# Patient Record
Sex: FEMALE | Race: WHITE | NOT HISPANIC OR LATINO | Employment: OTHER | URBAN - NONMETROPOLITAN AREA
[De-identification: names, ages, dates, MRNs, and addresses within clinical notes are randomized per-mention and may not be internally consistent; named-entity substitution may affect disease eponyms.]

---

## 2017-01-05 ENCOUNTER — TELEPHONE (OUTPATIENT)
Dept: FAMILY MEDICINE CLINIC | Facility: CLINIC | Age: 77
End: 2017-01-05

## 2017-01-05 RX ORDER — ALBUTEROL SULFATE 90 UG/1
2 AEROSOL, METERED RESPIRATORY (INHALATION) EVERY 4 HOURS PRN
Qty: 1 INHALER | Refills: 11 | Status: SHIPPED | OUTPATIENT
Start: 2017-01-05 | End: 2017-01-12 | Stop reason: SDUPTHER

## 2017-01-05 NOTE — TELEPHONE ENCOUNTER
----- Message from Lynn Melchor MA sent at 1/5/2017  2:18 PM CST -----  NEED REFILL VENTOLIN.    PT HAS APPT Monday, NEED REFILL BEFORE Monday.    [facility].

## 2017-01-07 ENCOUNTER — TRANSCRIBE ORDERS (OUTPATIENT)
Dept: CT IMAGING | Facility: HOSPITAL | Age: 77
End: 2017-01-07

## 2017-01-07 DIAGNOSIS — C15.5 MALIGNANT NEOPLASM OF LOWER THIRD OF ESOPHAGUS (HCC): Primary | ICD-10-CM

## 2017-01-09 ENCOUNTER — OFFICE VISIT (OUTPATIENT)
Dept: FAMILY MEDICINE CLINIC | Facility: CLINIC | Age: 77
End: 2017-01-09

## 2017-01-09 VITALS
SYSTOLIC BLOOD PRESSURE: 150 MMHG | BODY MASS INDEX: 24.98 KG/M2 | HEIGHT: 58 IN | DIASTOLIC BLOOD PRESSURE: 60 MMHG | HEART RATE: 126 BPM | OXYGEN SATURATION: 95 % | TEMPERATURE: 97.5 F | WEIGHT: 119 LBS

## 2017-01-09 DIAGNOSIS — M25.572 ACUTE LEFT ANKLE PAIN: Primary | ICD-10-CM

## 2017-01-09 PROCEDURE — 99213 OFFICE O/P EST LOW 20 MIN: CPT | Performed by: FAMILY MEDICINE

## 2017-01-09 RX ORDER — HYDROCODONE BITARTRATE AND ACETAMINOPHEN 5; 325 MG/1; MG/1
1 TABLET ORAL EVERY 4 HOURS PRN
COMMUNITY
End: 2017-09-12

## 2017-01-09 NOTE — PROGRESS NOTES
" Subjective   Tamara Singh is a 76 y.o. female.     History of Present Illness     Last Wednesday, tripped/fell and hurt left ankle.  Went to [facility].  Patient says \"arthritis so bad in feet cant tell if broken.\"  She is walking today using a walker. Given norco 5 and has no more.     Review of Systems   Constitutional: Negative for chills, fatigue and fever.   HENT: Negative for congestion, ear discharge, ear pain, facial swelling, hearing loss, postnasal drip, rhinorrhea, sinus pressure, sore throat, trouble swallowing and voice change.    Eyes: Negative for discharge, redness and visual disturbance.   Respiratory: Negative for cough, chest tightness, shortness of breath and wheezing.    Cardiovascular: Negative for chest pain and palpitations.   Gastrointestinal: Negative for abdominal pain, blood in stool, constipation, diarrhea, nausea and vomiting.   Endocrine: Negative for polydipsia and polyuria.   Genitourinary: Negative for dysuria, flank pain, hematuria and urgency.   Musculoskeletal: Positive for arthralgias and joint swelling. Negative for back pain and myalgias.   Skin: Negative for rash.   Neurological: Negative for dizziness, weakness, numbness and headaches.   Hematological: Negative for adenopathy.   Psychiatric/Behavioral: Negative for confusion and sleep disturbance. The patient is not nervous/anxious.        Objective   Physical Exam   Constitutional: She is oriented to person, place, and time. She appears well-developed and well-nourished.   HENT:   Head: Normocephalic and atraumatic.   Right Ear: External ear normal.   Left Ear: External ear normal.   Nose: Nose normal.   Eyes: Conjunctivae and EOM are normal. Pupils are equal, round, and reactive to light.   Neck: Normal range of motion.   Pulmonary/Chest: Effort normal.   Musculoskeletal: Normal range of motion.   Left ankle mild swelling.     Neurological: She is alert and oriented to person, place, and time.   Psychiatric: " She has a normal mood and affect. Her behavior is normal. Judgment and thought content normal.   Nursing note and vitals reviewed.      Assessment/Plan   Tamara was seen today for follow-up and pain.    Diagnoses and all orders for this visit:    Acute left ankle pain    Other orders  -     diclofenac (VOLTAREN) 50 MG EC tablet; Take 1 tablet by mouth 2 (Two) Times a Day.      Diclofenac should help pain more.  Continue walker.  If not a lot better in 2 weeks will need another xray.

## 2017-01-09 NOTE — MR AVS SNAPSHOT
Tamara Singh   1/9/2017 4:00 PM   Office Visit    Dept Phone:  [phone]   Encounter #:  [id number]    Provider:  Jean Pierre Scanlon MD   Department:  [facility]                Your Full Care Plan              Today's Medication Changes          These changes are accurate as of: 1/9/17  4:20 PM.  If you have any questions, ask your nurse or doctor.               New Medication(s)Ordered:     diclofenac 50 MG EC tablet   Commonly known as:  VOLTAREN   Take 1 tablet by mouth 2 (Two) Times a Day.   Started by:  Jean Pierre Scanlon MD         Medication(s)that have changed:     potassium chloride 10 MEQ CR tablet   Commonly known as:  K-DUR   Take 10 mEq by mouth daily.   What changed:  Another medication with the same name was removed. Continue taking this medication, and follow the directions you see here.   Changed by:  Corina Tenorio            Where to Get Your Medications      These medications were sent to [address] [phone] [facility] [phone] [address]     Phone:  [phone]     diclofenac 50 MG EC tablet                  Your Updated Medication List          This list is accurate as of: 1/9/17  4:20 PM.  Always use your most recent med list.                albuterol 108 (90 BASE) MCG/ACT inhaler   Commonly known as:  PROVENTIL HFA;VENTOLIN HFA   Inhale 2 puffs Every 4 (Four) Hours As Needed for wheezing.       Cholecalciferol 81214 UNITS capsule       * COMBIVENT RESPIMAT  MCG/ACT inhaler   Generic drug:  ipratropium-albuterol       * ipratropium-albuterol 0.5-2.5 mg/mL nebulizer   Commonly known as:  DUO-NEB       diclofenac 50 MG EC tablet   Commonly known as:  VOLTAREN   Take 1 tablet by mouth 2 (Two) Times a Day.       furosemide 40 MG tablet   Commonly known as:  LASIX       HYDROcodone-acetaminophen 5-325 MG per tablet   Commonly known as:   NORCO       latanoprost 0.005 % ophthalmic solution   Commonly known as:  XALATAN   Administer 1 drop to both eyes Every Night.       lisinopril 20 MG tablet   Commonly known as:  PRINIVIL,ZESTRIL   1 tablet daily.       potassium chloride 10 MEQ CR tablet   Commonly known as:  K-DUR       * Notice:  This list has 2 medication(s) that are the same as other medications prescribed for you. Read the directions carefully, and ask your doctor or other care provider to review them with you.            Instructions     None    Patient Instructions History      Upcoming Appointments     Visit Type Date Time Department    OFFICE VISIT 1/9/2017  4:00 PM [facility]    LAB 1/23/2017  1:30 PM  [facility]    CT [facility] CHEST W CONTRAST 1/24/2017 10:00 AM  [facility] CT    FOLLOW UP 2/3/2017  2:00 PM [facility] [place]    LAB 2/3/2017  1:30 PM  [facility]    VISUAL FIELD 3/21/2017 10:15 AM [facility] OPHTHALMOLOGY [facility]    OV WITH VISUAL FIELD 3/21/2017 10:45 AM [facility] OPHTHALMOLOGY [facility]      MyChart Signup     Our records indicate that you have declined [employer] signup. If you would like to sign up for [employer], please email [employer] [email or web address] [email or web address] or call [phone] to obtain an activation code.             Other Info from Your Visit           Your Appointments     Jan 23, 2017  1:30 PM CST   LAB with NURSE ISAÍAS GARCIA   [facility] ([address] [address] [phone]            Jan 24, 2017 10:00 AM CST   CT mad chest w contrast with MAD CT 1   [facility] ([place])    [address] [place] [id number] [phone]           NPO 4 HOURS PRIOR TO EXAM            Feb 03, 2017  1:30 PM CST   LAB with NURSE ISAÍAS GARCIA   [facility] ([address] [address] [phone]            Feb 03, 2017  2:00 PM CST   Follow Up with Jaun Mathew MD  "  [facility] HEMATOLOGY [facility]Y ([place])    [address] [place] [address] [phone]           Arrive 15 minutes prior to appointment.            Mar 21, 2017 10:15 AM CDT   Visual Field with [facility] OPHTHALMOLOGY (--)    [address]  [address] [facility] [address] [phone]              Allergies     No Known Allergies      Reason for Visit     Follow-up ER    Pain left leg      Vital Signs     Blood Pressure Pulse Temperature Height    150/60 (BP Location: Left arm, Patient Position: Sitting, Cuff Size: Adult) 126 97.5 °F (36.4 °C) (Temporal Artery ) 58\" (147.3 cm)    Weight Oxygen Saturation Body Mass Index Smoking Status    119 lb (54 kg) 95% 24.87 kg/m2 Former Smoker        "

## 2017-01-11 DIAGNOSIS — C15.5 MALIGNANT NEOPLASM OF LOWER THIRD OF ESOPHAGUS (HCC): Primary | ICD-10-CM

## 2017-01-23 ENCOUNTER — APPOINTMENT (OUTPATIENT)
Dept: ONCOLOGY | Facility: HOSPITAL | Age: 77
End: 2017-01-23

## 2017-01-24 ENCOUNTER — APPOINTMENT (OUTPATIENT)
Dept: CT IMAGING | Facility: HOSPITAL | Age: 77
End: 2017-01-24
Attending: INTERNAL MEDICINE

## 2017-02-03 ENCOUNTER — APPOINTMENT (OUTPATIENT)
Dept: ONCOLOGY | Facility: HOSPITAL | Age: 77
End: 2017-02-03

## 2017-02-13 ENCOUNTER — HOSPITAL ENCOUNTER (OUTPATIENT)
Dept: CT IMAGING | Facility: HOSPITAL | Age: 77
Discharge: HOME | End: 2017-02-13
Admitting: INTERNAL MEDICINE

## 2017-02-13 ENCOUNTER — LAB REQUISITION (OUTPATIENT)
Dept: LAB | Facility: HOSPITAL | Age: 77
End: 2017-02-13

## 2017-02-13 DIAGNOSIS — C15.5 MALIGNANT NEOPLASM OF LOWER THIRD OF ESOPHAGUS (HCC): ICD-10-CM

## 2017-02-13 LAB
ALBUMIN SERPL-MCNC: 4.2 G/DL (ref 3.4–4.8)
ALBUMIN/GLOB SERPL: 1.3 G/DL (ref 1.1–1.8)
ALP SERPL-CCNC: 178 U/L (ref 38–126)
ALT SERPL W P-5'-P-CCNC: 45 U/L (ref 9–52)
ANION GAP SERPL CALCULATED.3IONS-SCNC: 10 MMOL/L (ref 5–15)
AST SERPL-CCNC: 40 U/L (ref 14–36)
BASOPHILS # BLD AUTO: 0.03 10*3/MM3 (ref 0–0.2)
BASOPHILS NFR BLD AUTO: 0.5 % (ref 0–2)
BILIRUB SERPL-MCNC: 0.4 MG/DL (ref 0.2–1.3)
BUN BLD-MCNC: 18 MG/DL (ref 7–21)
BUN/CREAT SERPL: 17.5 (ref 7–25)
CALCIUM SPEC-SCNC: 9.6 MG/DL (ref 8.4–10.2)
CHLORIDE SERPL-SCNC: 103 MMOL/L (ref 95–110)
CO2 SERPL-SCNC: 29 MMOL/L (ref 22–31)
CREAT BLD-MCNC: 1.03 MG/DL (ref 0.5–1)
DEPRECATED RDW RBC AUTO: 46.3 FL (ref 36.4–46.3)
EOSINOPHIL # BLD AUTO: 0.08 10*3/MM3 (ref 0–0.7)
EOSINOPHIL NFR BLD AUTO: 1.3 % (ref 0–7)
ERYTHROCYTE [DISTWIDTH] IN BLOOD BY AUTOMATED COUNT: 13.1 % (ref 11.5–14.5)
GFR SERPL CREATININE-BSD FRML MDRD: 52 ML/MIN/1.73 (ref 39–90)
GLOBULIN UR ELPH-MCNC: 3.2 GM/DL (ref 2.3–3.5)
GLUCOSE BLD-MCNC: 94 MG/DL (ref 60–100)
HCT VFR BLD AUTO: 35.5 % (ref 35–45)
HGB BLD-MCNC: 11.6 G/DL (ref 12–15.5)
IMM GRANULOCYTES # BLD: 0.01 10*3/MM3 (ref 0–0.02)
IMM GRANULOCYTES NFR BLD: 0.2 % (ref 0–0.5)
LYMPHOCYTES # BLD AUTO: 1.51 10*3/MM3 (ref 0.6–4.2)
LYMPHOCYTES NFR BLD AUTO: 24.2 % (ref 10–50)
MCH RBC QN AUTO: 31.4 PG (ref 26.5–34)
MCHC RBC AUTO-ENTMCNC: 32.7 G/DL (ref 31.4–36)
MCV RBC AUTO: 96.2 FL (ref 80–98)
MONOCYTES # BLD AUTO: 0.46 10*3/MM3 (ref 0–0.9)
MONOCYTES NFR BLD AUTO: 7.4 % (ref 0–12)
NEUTROPHILS # BLD AUTO: 4.16 10*3/MM3 (ref 2–8.6)
NEUTROPHILS NFR BLD AUTO: 66.4 % (ref 37–80)
NRBC BLD MANUAL-RTO: 0 /100 WBC (ref 0–0)
PLATELET # BLD AUTO: 256 10*3/MM3 (ref 150–450)
PMV BLD AUTO: 9.2 FL (ref 8–12)
POTASSIUM BLD-SCNC: 4.9 MMOL/L (ref 3.5–5.1)
PROT SERPL-MCNC: 7.4 G/DL (ref 6.3–8.6)
RBC # BLD AUTO: 3.69 10*6/MM3 (ref 3.77–5.16)
SODIUM BLD-SCNC: 142 MMOL/L (ref 137–145)
WBC NRBC COR # BLD: 6.25 10*3/MM3 (ref 3.2–9.8)

## 2017-02-13 PROCEDURE — 71260 CT THORAX DX C+: CPT

## 2017-02-13 PROCEDURE — 0 IOPAMIDOL 61 % SOLUTION: Performed by: INTERNAL MEDICINE

## 2017-02-13 PROCEDURE — 85025 COMPLETE CBC W/AUTO DIFF WBC: CPT | Performed by: INTERNAL MEDICINE

## 2017-02-13 PROCEDURE — 80053 COMPREHEN METABOLIC PANEL: CPT | Performed by: INTERNAL MEDICINE

## 2017-02-13 RX ADMIN — IOPAMIDOL 80 ML: 612 INJECTION, SOLUTION INTRAVENOUS at 14:15

## 2017-02-21 ENCOUNTER — OFFICE VISIT (OUTPATIENT)
Dept: ONCOLOGY | Facility: CLINIC | Age: 77
End: 2017-02-21

## 2017-02-21 ENCOUNTER — DOCUMENTATION (OUTPATIENT)
Dept: ONCOLOGY | Facility: CLINIC | Age: 77
End: 2017-02-21

## 2017-02-21 VITALS
DIASTOLIC BLOOD PRESSURE: 72 MMHG | SYSTOLIC BLOOD PRESSURE: 142 MMHG | RESPIRATION RATE: 18 BRPM | HEART RATE: 97 BPM | HEIGHT: 58 IN | WEIGHT: 120.5 LBS | BODY MASS INDEX: 25.3 KG/M2 | TEMPERATURE: 97.9 F

## 2017-02-21 DIAGNOSIS — Z85.01 PERSONAL HISTORY OF ESOPHAGEAL CANCER: Primary | ICD-10-CM

## 2017-02-21 PROCEDURE — 99214 OFFICE O/P EST MOD 30 MIN: CPT | Performed by: INTERNAL MEDICINE

## 2017-02-21 PROCEDURE — G0463 HOSPITAL OUTPT CLINIC VISIT: HCPCS

## 2017-02-21 NOTE — PROGRESS NOTES
Ms. Singh returns today for follow-up for esophageal cancer.    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  She was treated with radiation given concurrently with carboplatin and Taxol until September 2013.  She has remained in remission since.    Interval history:  She's been doing extremely well with no difficulty swallowing.  She has mild choking spell.  Otherwise, she is able to swallow solids and liquids orally.  Her weight is stable.  She denies any nausea, vomiting or heartburn.  She has a history of COPD and needs oxygen and during sleep.  She uses bronchodilators on a regular basis.  Her performance status is limited by COPD related shortness of breath up to 1-2 blocks of walking.  She denies any abdominal pain, change in bowel habits, melena or hematochezia.    All other systems review is negative.  Her current medications, past medical, social, family history have been reviewed and updated in the EMR    Physical exam shows her to be in no apparent distress.  She is thin built, pleasant.  No pallor, icterus or skin rashes were noted.  Examination of the neck supraclavicular and axillary areas shows no lymph nodes enlargement.  Lungs show bilateral scattered rhonchi with prolonged expiration without any dullness to percussion.  Heart sounds are regular.    Abdomen is soft, nontender, no hepatosplenomegaly.  All 4 extremities are without any edema.  Nervous system examination shows no cross motor or sensory deficits.  Cranial nerves are intact.  Her CBC and CMP today are normal.    Data CT scan of the chest on January 7, 2017 that showed no signs of recurrence.  There were extensive emphysematous changes and chronic fibrosis.    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  She is about 3 years out with no  evidence of disease recurrence.  I reviewed the recent CT scan results with her.  She has quit smoking several years ago.  Her overall performance status is limited by moderate to severe COPD, she is oxygen dependent during sleep and with activity.  At this point we'll wait to manage her with a conservative approach and have her return in 6 months.  Future imaging studies only for symptoms or signs suspicious for recurrence.  She was encouraged to contact us for any problems with dysphagia, hoarseness of voice or excessive shortness of breath, chest pain, etc.

## 2017-02-21 NOTE — PROGRESS NOTES
LCSW contacted by RN as pt presents for follow up with medical oncologist. Pt with history of esophogeal cancer, has completed treatment presents for routine follow up. Pt. Stated feelings indicative of depression to nurse and SW followed up with pt in the room. SW introduced self and explained role, support and contact info provided. Pt. Was encouraged to share her thoughts and feelings and describes that recently she sustained a fall and injured her ankle that has resulted in  in mobility. She states she feels sad at times, denies any thoughts of self inflicted harm. She presents this day with bright affect, good eye contact and mood within normal limits and congruent with her situation.   Pt. Was openly supported with active listening, encouragement, normalization of emotions and education related to depression and anxiety. Pt. Is accompanied this day by a personal friend who appears supportive of patient and positive.  Building of trust and rapport were evidenced as pt openly engaged and responded receptive to SW feedback.  Pt. States no immediate needs, she describes symptoms that are within the scope of normal for her situation and no indication of major mood disorder observed or reported.  Ongoing support reinforced with pt stating willingness to call in the event needs arise or mood declines.

## 2017-03-13 RX ORDER — FUROSEMIDE 40 MG/1
TABLET ORAL
Qty: 30 TABLET | Refills: 0 | Status: SHIPPED | OUTPATIENT
Start: 2017-03-13 | End: 2017-05-14 | Stop reason: SDUPTHER

## 2017-03-21 ENCOUNTER — OFFICE VISIT (OUTPATIENT)
Dept: OPHTHALMOLOGY | Facility: CLINIC | Age: 77
End: 2017-03-21

## 2017-03-21 DIAGNOSIS — H40.1132 PRIMARY OPEN ANGLE GLAUCOMA OF BOTH EYES, MODERATE STAGE: Primary | ICD-10-CM

## 2017-03-21 DIAGNOSIS — Z96.1 PSEUDOPHAKIA: ICD-10-CM

## 2017-03-21 DIAGNOSIS — H18.519 CORNEAL ENDOTHELIAL DYSTROPHY: ICD-10-CM

## 2017-03-21 DIAGNOSIS — H25.11 NUCLEAR SCLEROSIS, RIGHT: ICD-10-CM

## 2017-03-21 PROCEDURE — 99213 OFFICE O/P EST LOW 20 MIN: CPT | Performed by: OPHTHALMOLOGY

## 2017-03-21 PROCEDURE — 92083 EXTENDED VISUAL FIELD XM: CPT | Performed by: OPHTHALMOLOGY

## 2017-03-21 RX ORDER — BRIMONIDINE TARTRATE 0.15 %
1 DROPS OPHTHALMIC (EYE) 2 TIMES DAILY
Qty: 1 EACH | Refills: 12 | Status: SHIPPED | OUTPATIENT
Start: 2017-03-21 | End: 2017-04-11

## 2017-03-21 NOTE — PROGRESS NOTES
Subjective   Tamara Singh is a 76 y.o. female.   Chief Complaint   Patient presents with   • Cataract   • Glaucoma   • Artificial Lens Present     HPI     Glaucoma   Laterality: right eye       Last edited by Payal Leong on 3/21/2017 10:26 AM. (History)          Review of Systems    Objective   Visual Acuity (Snellen - Linear)      Right Left   Dist cc 20/400 20/40 -1       Correction:  Glasses         Wearing Rx      Sphere Cylinder Axis Add   Right -3.00 +0.25 133 +2.75   Left -0.50 +0.50 049 +2.75                  Not recorded               Main Ophthalmology Exam     External Exam      Right Left    External Normal Normal      Slit Lamp Exam      Right Left    Lids/Lashes Normal Normal    Conjunctiva/Sclera White and quiet White and quiet    Cornea 3+ Guttata 2+ Guttata    Anterior Chamber Deep and quiet Deep and quiet    Iris Round and reactive Round and reactive    Lens 3+ Nuclear sclerosis, 1+ Cortical cataract Posterior chamber intraocular lens    Vitreous Normal Normal      Fundus Exam      Right Left    Disc not seen Thin rim 0.25 ST, cupped IT    Macula poor view Normal    Vessels  Normal    Periphery  Normal            John Visual Field :   OD- gen depression  OS- superior nasal step and superior arcuate defect    Assessment/Plan   Diagnoses and all orders for this visit:    Primary open angle glaucoma of both eyes, moderate stage  Comments:  optic disc change and new VF deficit OS  Orders:  -     brimonidine (ALPHAGAN P) 0.15 % ophthalmic solution; Administer 1 drop to both eyes 2 (Two) Times a Day.    Nuclear sclerosis, right    Corneal endothelial dystrophy    Pseudophakia    Plan:   add brim OU    Return in about 3 weeks (around 4/11/2017) for OCT discs.

## 2017-04-11 ENCOUNTER — OFFICE VISIT (OUTPATIENT)
Dept: OPHTHALMOLOGY | Facility: CLINIC | Age: 77
End: 2017-04-11

## 2017-04-11 DIAGNOSIS — Z96.1 ARTIFICIAL LENS PRESENT: ICD-10-CM

## 2017-04-11 DIAGNOSIS — H40.003 BORDERLINE GLAUCOMA, BILATERAL: Primary | ICD-10-CM

## 2017-04-11 PROCEDURE — 99212 OFFICE O/P EST SF 10 MIN: CPT | Performed by: OPHTHALMOLOGY

## 2017-04-11 NOTE — PROGRESS NOTES
Subjective   Tamara Singh is a 76 y.o. female.   Chief Complaint   Patient presents with   • Glaucoma   • Follow-up     HPI     Glaucoma   Laterality: both eyes       Last edited by Manda Robertson on 4/11/2017  1:09 PM. (History)          Review of Systems    Objective   Base Eye Exam     Visual Acuity (Snellen - Linear)      Right Left   Dist cc CF at 3' 20/50 -1       Correction:  Glasses      Tonometry (Applanation, 1:23 PM)      Right Left   Pressure 25 25                   Assessment/Plan   Diagnoses and all orders for this visit:    Borderline glaucoma, bilateral  Comments:  IOP up  Orders:  -     Brinzolamide-Brimonidine (SIMBRINZA) 1-0.2 % suspension; Administer 1 drop to both eyes 2 (Two) Times a Day.    Artificial lens present      Plan:   change brim to Simbrinza, cont latanoprost    Return in about 2 weeks (around 4/25/2017).

## 2017-04-25 ENCOUNTER — OFFICE VISIT (OUTPATIENT)
Dept: OPHTHALMOLOGY | Facility: CLINIC | Age: 77
End: 2017-04-25

## 2017-04-25 DIAGNOSIS — H40.003 BORDERLINE GLAUCOMA, BILATERAL: ICD-10-CM

## 2017-04-25 PROCEDURE — 99212 OFFICE O/P EST SF 10 MIN: CPT | Performed by: OPHTHALMOLOGY

## 2017-04-25 NOTE — PROGRESS NOTES
Subjective   Tamara Singh is a 76 y.o. female.   Chief Complaint   Patient presents with   • Glaucoma   • Follow-up     HPI     Glaucoma   Laterality: both eyes           Comments   Out of Simbrinza 2 days ago, using latanoprost       Last edited by Ruiz Bush MD on 4/25/2017  2:14 PM. (History)          Review of Systems    Objective   Base Eye Exam     Visual Acuity (Snellen - Linear)      Right Left   Dist cc CF at 3' 20/50 +2       Correction:  Glasses      Tonometry (Applanation, 2:14 PM)      Right Left   Pressure 18 18               Slit Lamp and Fundus Exam     External Exam      Right Left    External Normal Normal      Slit Lamp Exam      Right Left    Lids/Lashes Normal Normal    Conjunctiva/Sclera White and quiet White and quiet    Cornea 3+ Guttata 2+ Guttata    Anterior Chamber Deep and quiet Deep and quiet    Iris Round and reactive Round and reactive    Lens 3+ Nuclear sclerosis, 1+ Cortical cataract Posterior chamber intraocular lens    Vitreous Normal Normal      Fundus Exam      Right Left    Disc not seen Thin rim 0.25 ST, cupped IT    Macula poor view Normal    Vessels  Normal    Periphery  Normal            Refraction     Wearing Rx      Sphere Cylinder Axis Add   Right -3.00 +0.25 133 +2.75   Left -0.50 +0.50 049 +2.75                   Assessment/Plan   Diagnoses and all orders for this visit:    Borderline glaucoma, bilateral  Comments:  IOP down with Simbrinza  Orders:  -     Brinzolamide-Brimonidine (SIMBRINZA) 1-0.2 % suspension; Administer 1 drop to both eyes 2 (Two) Times a Day.      Plan:    Continue current drops      Return in about 3 months (around 7/25/2017).

## 2017-05-15 RX ORDER — FUROSEMIDE 40 MG/1
TABLET ORAL
Qty: 30 TABLET | Refills: 0 | Status: SHIPPED | OUTPATIENT
Start: 2017-05-15 | End: 2017-06-13 | Stop reason: SDUPTHER

## 2017-05-19 ENCOUNTER — OFFICE VISIT (OUTPATIENT)
Dept: PODIATRY | Facility: CLINIC | Age: 77
End: 2017-05-19

## 2017-05-19 VITALS
SYSTOLIC BLOOD PRESSURE: 129 MMHG | WEIGHT: 115 LBS | OXYGEN SATURATION: 91 % | DIASTOLIC BLOOD PRESSURE: 71 MMHG | HEIGHT: 58 IN | HEART RATE: 103 BPM | BODY MASS INDEX: 24.14 KG/M2

## 2017-05-19 DIAGNOSIS — M79.675 PAIN IN TOES OF BOTH FEET: ICD-10-CM

## 2017-05-19 DIAGNOSIS — B35.1 ONYCHOMYCOSIS: Primary | ICD-10-CM

## 2017-05-19 DIAGNOSIS — M79.674 PAIN IN TOES OF BOTH FEET: ICD-10-CM

## 2017-05-19 PROCEDURE — 99202 OFFICE O/P NEW SF 15 MIN: CPT | Performed by: PODIATRIST

## 2017-05-19 PROCEDURE — 11721 DEBRIDE NAIL 6 OR MORE: CPT | Performed by: PODIATRIST

## 2017-06-13 RX ORDER — FUROSEMIDE 40 MG/1
TABLET ORAL
Qty: 30 TABLET | Refills: 11 | Status: SHIPPED | OUTPATIENT
Start: 2017-06-13 | End: 2018-06-24 | Stop reason: SDUPTHER

## 2017-06-13 RX ORDER — ALBUTEROL SULFATE 90 UG/1
AEROSOL, METERED RESPIRATORY (INHALATION)
Qty: 18 G | Refills: 11 | Status: SHIPPED | OUTPATIENT
Start: 2017-06-13 | End: 2017-10-12 | Stop reason: SDUPTHER

## 2017-07-24 RX ORDER — IPRATROPIUM/ALBUTEROL SULFATE 20-100 MCG
MIST INHALER (GRAM) INHALATION
Qty: 4 G | Refills: 11 | Status: SHIPPED | OUTPATIENT
Start: 2017-07-24 | End: 2017-10-12 | Stop reason: SDUPTHER

## 2017-07-24 RX ORDER — IPRATROPIUM BROMIDE AND ALBUTEROL SULFATE 2.5; .5 MG/3ML; MG/3ML
SOLUTION RESPIRATORY (INHALATION)
Qty: 360 ML | Refills: 11 | Status: SHIPPED | OUTPATIENT
Start: 2017-07-24 | End: 2017-09-02 | Stop reason: SDUPTHER

## 2017-07-27 ENCOUNTER — OFFICE VISIT (OUTPATIENT)
Dept: OPHTHALMOLOGY | Facility: CLINIC | Age: 77
End: 2017-07-27

## 2017-07-27 DIAGNOSIS — H40.003 BORDERLINE GLAUCOMA, BILATERAL: ICD-10-CM

## 2017-07-27 DIAGNOSIS — IMO0002 NUCLEAR CATARACT, RIGHT: Primary | ICD-10-CM

## 2017-07-27 PROCEDURE — 99213 OFFICE O/P EST LOW 20 MIN: CPT | Performed by: OPHTHALMOLOGY

## 2017-07-27 NOTE — PROGRESS NOTES
Subjective   Tamara Singh is a 77 y.o. female.   Chief Complaint   Patient presents with   • Cataract   • Glaucoma Suspect     HPI     Glaucoma Suspect   Laterality: right eye           Comments   No using Simbrinza       Last edited by Ruiz Bush MD on 7/27/2017  2:11 PM. (History)          Review of Systems    Objective   Base Eye Exam     Visual Acuity (Snellen - Linear)      Right Left   Dist cc CF at 3' 20/70       Correction:  Glasses      Tonometry (Applanation, 2:08 PM)      Right Left   Pressure 13 15               Slit Lamp and Fundus Exam     External Exam      Right Left    External Normal Normal      Slit Lamp Exam      Right Left    Lids/Lashes Normal Normal    Conjunctiva/Sclera White and quiet White and quiet    Cornea 3+ Guttata 2+ Guttata    Anterior Chamber Deep and quiet Deep and quiet    Iris Round and reactive Round and reactive    Lens 3+ Nuclear sclerosis, 1+ Cortical cataract Posterior chamber intraocular lens    Vitreous Normal Normal      Fundus Exam      Right Left    Disc not seen Thin rim 0.25 ST, cupped IT    Macula poor view Normal    Vessels  Normal            Refraction     Manifest Refraction      Sphere Cylinder Dist Add   Right Balance      Left +1.00 Sphere 20/40 +2.50         Final Rx      Sphere Cylinder Add   Right Balance     Left +1.00 Sphere +2.50                   Assessment/Plan   Diagnoses and all orders for this visit:    Nuclear cataract, right    Borderline glaucoma, bilateral  Comments:  IOP down with latanoprost      Plan:   continue latanoprost  F/u ophth 4 months, pt desires Dr Che

## 2017-08-25 ENCOUNTER — APPOINTMENT (OUTPATIENT)
Dept: ONCOLOGY | Facility: HOSPITAL | Age: 77
End: 2017-08-25

## 2017-09-05 RX ORDER — IPRATROPIUM BROMIDE AND ALBUTEROL SULFATE 2.5; .5 MG/3ML; MG/3ML
SOLUTION RESPIRATORY (INHALATION)
Qty: 360 ML | Refills: 11 | Status: SHIPPED | OUTPATIENT
Start: 2017-09-05 | End: 2017-10-09 | Stop reason: SDUPTHER

## 2017-09-11 DIAGNOSIS — Z85.01 PERSONAL HISTORY OF ESOPHAGEAL CANCER: Primary | ICD-10-CM

## 2017-09-12 ENCOUNTER — LAB (OUTPATIENT)
Dept: ONCOLOGY | Facility: HOSPITAL | Age: 77
End: 2017-09-12

## 2017-09-12 ENCOUNTER — OFFICE VISIT (OUTPATIENT)
Dept: ONCOLOGY | Facility: CLINIC | Age: 77
End: 2017-09-12

## 2017-09-12 VITALS
RESPIRATION RATE: 20 BRPM | BODY MASS INDEX: 25.25 KG/M2 | HEIGHT: 58 IN | HEART RATE: 100 BPM | TEMPERATURE: 97.8 F | DIASTOLIC BLOOD PRESSURE: 58 MMHG | SYSTOLIC BLOOD PRESSURE: 111 MMHG | WEIGHT: 120.3 LBS

## 2017-09-12 DIAGNOSIS — N17.9 ACUTE KIDNEY INJURY (HCC): ICD-10-CM

## 2017-09-12 DIAGNOSIS — Z85.01 PERSONAL HISTORY OF ESOPHAGEAL CANCER: ICD-10-CM

## 2017-09-12 DIAGNOSIS — Z85.01 HISTORY OF ESOPHAGEAL CANCER: Primary | ICD-10-CM

## 2017-09-12 LAB
ALBUMIN SERPL-MCNC: 4.6 G/DL (ref 3.4–4.8)
ALBUMIN/GLOB SERPL: 1.5 G/DL (ref 1.1–1.8)
ALP SERPL-CCNC: 95 U/L (ref 38–126)
ALT SERPL W P-5'-P-CCNC: 31 U/L (ref 9–52)
ANION GAP SERPL CALCULATED.3IONS-SCNC: 10 MMOL/L (ref 5–15)
AST SERPL-CCNC: 38 U/L (ref 14–36)
BASOPHILS # BLD AUTO: 0.04 10*3/MM3 (ref 0–0.2)
BASOPHILS NFR BLD AUTO: 0.7 % (ref 0–2)
BILIRUB SERPL-MCNC: 0.4 MG/DL (ref 0.2–1.3)
BUN BLD-MCNC: 41 MG/DL (ref 7–21)
BUN/CREAT SERPL: 24.1 (ref 7–25)
CALCIUM SPEC-SCNC: 9.4 MG/DL (ref 8.4–10.2)
CHLORIDE SERPL-SCNC: 101 MMOL/L (ref 95–110)
CO2 SERPL-SCNC: 28 MMOL/L (ref 22–31)
CREAT BLD-MCNC: 1.7 MG/DL (ref 0.5–1)
DEPRECATED RDW RBC AUTO: 46.6 FL (ref 36.4–46.3)
EOSINOPHIL # BLD AUTO: 0.06 10*3/MM3 (ref 0–0.7)
EOSINOPHIL NFR BLD AUTO: 1.1 % (ref 0–7)
ERYTHROCYTE [DISTWIDTH] IN BLOOD BY AUTOMATED COUNT: 13.2 % (ref 11.5–14.5)
GFR SERPL CREATININE-BSD FRML MDRD: 29 ML/MIN/1.73 (ref 39–90)
GLOBULIN UR ELPH-MCNC: 3 GM/DL (ref 2.3–3.5)
GLUCOSE BLD-MCNC: 92 MG/DL (ref 60–100)
HCT VFR BLD AUTO: 36.7 % (ref 35–45)
HGB BLD-MCNC: 12 G/DL (ref 12–15.5)
IMM GRANULOCYTES # BLD: 0.01 10*3/MM3 (ref 0–0.02)
IMM GRANULOCYTES NFR BLD: 0.2 % (ref 0–0.5)
LYMPHOCYTES # BLD AUTO: 1.6 10*3/MM3 (ref 0.6–4.2)
LYMPHOCYTES NFR BLD AUTO: 29.5 % (ref 10–50)
MCH RBC QN AUTO: 31.6 PG (ref 26.5–34)
MCHC RBC AUTO-ENTMCNC: 32.7 G/DL (ref 31.4–36)
MCV RBC AUTO: 96.6 FL (ref 80–98)
MONOCYTES # BLD AUTO: 0.47 10*3/MM3 (ref 0–0.9)
MONOCYTES NFR BLD AUTO: 8.7 % (ref 0–12)
NEUTROPHILS # BLD AUTO: 3.25 10*3/MM3 (ref 2–8.6)
NEUTROPHILS NFR BLD AUTO: 59.8 % (ref 37–80)
PLATELET # BLD AUTO: 238 10*3/MM3 (ref 150–450)
PMV BLD AUTO: 9 FL (ref 8–12)
POTASSIUM BLD-SCNC: 4.6 MMOL/L (ref 3.5–5.1)
PROT SERPL-MCNC: 7.6 G/DL (ref 6.3–8.6)
RBC # BLD AUTO: 3.8 10*6/MM3 (ref 3.77–5.16)
SODIUM BLD-SCNC: 139 MMOL/L (ref 137–145)
WBC NRBC COR # BLD: 5.43 10*3/MM3 (ref 3.2–9.8)

## 2017-09-12 PROCEDURE — 99214 OFFICE O/P EST MOD 30 MIN: CPT | Performed by: INTERNAL MEDICINE

## 2017-09-12 PROCEDURE — 85025 COMPLETE CBC W/AUTO DIFF WBC: CPT

## 2017-09-12 PROCEDURE — 80053 COMPREHEN METABOLIC PANEL: CPT

## 2017-09-12 PROCEDURE — G0463 HOSPITAL OUTPT CLINIC VISIT: HCPCS | Performed by: INTERNAL MEDICINE

## 2017-09-12 NOTE — PROGRESS NOTES
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  Denies any difficulty swallowing with solids or liquids.  Denies any abnormal weight loss recently.  Complains of back pain secondary to arthritis.        PAST MEDICAL HISTORY:    Past Medical History:   Diagnosis Date   • Acute bronchitis    • Artificial lens present     in position    • Backache    • Borderline glaucoma    • Borderline glaucoma    • Chronic obstructive lung disease    • Chronic obstructive lung disease    • Common cold    • Dysphagia    • Dyspnea    • Edema    • Encounter for immunization    • Epigastric pain    • Essential hypertension    • External hemorrhoids without complication    • Fever    • Heartburn    • Low back pain    • Malignant neoplasm     Malignant neoplasm of other specified part of esophagus   • Malignant tumor of lower third of esophagus    • Nuclear cataract    • Perleche    • Pneumathemia     UNSPECIFIED ORGANISM   • Shoulder joint pain    • Upper respiratory infection    • Vitamin D deficiency    • Wheezing        SOCIAL HISTORY:    Social History   Substance Use Topics   • Smoking status: Former Smoker     Types: Cigarettes     Quit date: 1/9/2014   • Smokeless tobacco: None   • Alcohol use No       Surgical History :  Past Surgical History:   Procedure Laterality Date   • CATARACT EXTRACTION  08/06/2015    Remove cataract, insert lens (Left eye.)   • CHOLECYSTECTOMY  07/14/2004   • COLONOSCOPY  05/23/2013    Colon endoscopy 06276 (Internal & external hemorrhoids found.)   • ENDOSCOPY  11/26/2014    EGD w/ biopsy 81623 (Normal esophagus. Biopsy taken. Normal stomach. Normal duodenum.)   11/26/2014 GURPREET BHAT    • ENDOSCOPY  03/19/2014    EGD w/ tube 45377  "(Esophagitis seen. Biopsy taken. No evidence of esophageal ca. Gastritis in stomach. Biopsy taken. Normal duodenum.)   • ENDOSCOPY  10/02/2013    EGD w/ tube 61606 (Normal esopahgus, stoamch, & duodenum. No evidence of tumor. Biopsy taken.)   • ENDOSCOPY  05/23/2013    EGD w/ tube 74339 (Tumor in distal 3rd of esophagus. Biopsy taken. Lesion runs from 28 cms to 34 cms. Normal stomach & duodenum.)   • INJECTION OF MEDICATION  01/25/2016    Kenalog (5)      • OTHER SURGICAL HISTORY  12/10/2014    Drain/Inject Major Joint 20610 (Shoulder joint pain)    • OTHER SURGICAL HISTORY      OCT DISC NFL 75934 (Primary open angle glaucoma (2): 7/26/2016, 6/16/2015   • OTHER SURGICAL HISTORY  07/26/2016    OPTIC NERVE HEAD EVAL PERFORMED 2027F (Primary open angle glaucoma)        ALLERGIES:    No Known Allergies    REVIEW OF SYSTEMS:      CONSTITUTIONAL:  No fever, chills,weight loss or night sweats.     HEENT:  No epistaxis, mouth sores, or difficulty swallowing.    RESPIRATORY:  No new shortness of breath or cough at present.    CARDIOVASCULAR:  No chest pain or palpitations.    GASTROINTESTINAL:  No abdominal pain, nausea, vomiting, or blood in the stool.    GENITOURINARY:  No dysuria or hematuria.    MUSCULOSKELETAL: Complains of back pain secondary to arthritis.    NEUROLOGICAL:  No tingling or numbness. No new headache or dizziness.     LYMPHATICS:  Denies any abnormal swollen and anywhere in the body.    SKIN:  Denies any new skin rash.        PHYSICAL EXAMINATION:      VITAL SIGNS:  /58  Pulse 100  Temp 97.8 °F (36.6 °C) (Temporal Artery )   Resp 20  Ht 58\" (147.3 cm)  Wt 120 lb 4.8 oz (54.6 kg)  BMI 25.14 kg/m2    GENERAL:  Not in any distress.    HEENT:  Normocephalic, Atraumatic.Mild Conjunctival pallor. No icterus. Extraocular Movements Intact. No Facial Asymmetry noted.    NECK:  No adenopathy. No JVD.    RESPIRATORY:  Fair air entry bilateral. No rhonchi or wheezing.    CARDIOVASCULAR:  S1, S2. Regular " rate and rhythm. No murmur or gallop appreciated.    ABDOMEN:  Soft,  nontender. Bowel sounds present in all four quadrants.  No organomegaly appreciated.    EXTREMITIES:  Trace ankle  edema.No Calf Tenderness.    NEUROLOGIC:  Alert, awake and oriented ×3.  No  Motor or sensory deficit appreciated. Cranial Nerves 2-12 grossly intact.        DIAGNOSTIC DATA:    Glucose   Date Value Ref Range Status   09/12/2017 92 60 - 100 mg/dL Final     Sodium   Date Value Ref Range Status   09/12/2017 139 137 - 145 mmol/L Final     Potassium   Date Value Ref Range Status   09/12/2017 4.6 3.5 - 5.1 mmol/L Final     CO2   Date Value Ref Range Status   09/12/2017 28.0 22.0 - 31.0 mmol/L Final     Chloride   Date Value Ref Range Status   09/12/2017 101 95 - 110 mmol/L Final     Anion Gap   Date Value Ref Range Status   09/12/2017 10.0 5.0 - 15.0 mmol/L Final     Creatinine   Date Value Ref Range Status   09/12/2017 1.70 (H) 0.50 - 1.00 mg/dL Final     BUN   Date Value Ref Range Status   09/12/2017 41 (H) 7 - 21 mg/dL Final     BUN/Creatinine Ratio   Date Value Ref Range Status   09/12/2017 24.1 7.0 - 25.0 Final     Calcium   Date Value Ref Range Status   09/12/2017 9.4 8.4 - 10.2 mg/dL Final     eGFR Non  Amer   Date Value Ref Range Status   09/12/2017 29 (L) 39 - 90 mL/min/1.73 Final     Alkaline Phosphatase   Date Value Ref Range Status   09/12/2017 95 38 - 126 U/L Final     Total Protein   Date Value Ref Range Status   09/12/2017 7.6 6.3 - 8.6 g/dL Final     ALT (SGPT)   Date Value Ref Range Status   09/12/2017 31 9 - 52 U/L Final     AST (SGOT)   Date Value Ref Range Status   09/12/2017 38 (H) 14 - 36 U/L Final     Total Bilirubin   Date Value Ref Range Status   09/12/2017 0.4 0.2 - 1.3 mg/dL Final     Albumin   Date Value Ref Range Status   09/12/2017 4.60 3.40 - 4.80 g/dL Final     Globulin   Date Value Ref Range Status   09/12/2017 3.0 2.3 - 3.5 gm/dL Final     A/G Ratio   Date Value Ref Range Status   09/12/2017 1.5 1.1  - 1.8 g/dL Final     Lab Results   Component Value Date    WBC 5.43 09/12/2017    HGB 12.0 09/12/2017    HCT 36.7 09/12/2017    MCV 96.6 09/12/2017     09/12/2017     Lab Results   Component Value Date    NEUTROABS 3.25 09/12/2017             RADIOLOGY DATA :  CT of chest with contrast on February 13, 2017 showed:  IMPRESSION:  Conclusion: Chronic changes right lung, chronic loss of volume and shift of the heart and mediastinum to the right. Emphysematous changes right apex. Chronic fibrotic changes and bronchiectasis medial right lung. Chronic appearing diffuse thickening of   the esophagus possibly a manifestation of chronic reflux. Small hiatus hernia.      CT chest with contrast is otherwise unremarkable and not significant changed since prior exam. No findings suggesting recurrent or metastatic disease.        ASSESSMENT AND PLAN:       1.  Squamous cell cancer of distal esophagus, stage II, diagnosed in May 2013.  Patient received concurrent chemoradiation with weekly carboplatin and Taxol finished in September 2013.  Patient has been in remission since then.  Last EGD done in 2014 was within normal limit.  Most recent surveillance CT scan done in February 2017 was negative for any recurrence.  At this point we'll continue with clinical surveillance.  We will see her back in about 6 months with a repeat CBC and CMP on that day.  Patient was encouraged to call us if she starts having any dysphagia or any abnormal swollen and anywhere in the body.    2.  Acute kidney injury: Patient was found to have elevation in creatinine at 1.7 today.  Previous creatinine was 1.03.  Patient was instructed to increase her hydration and avoid any kind of medication that can damage kidney like ibuprofen or Motrin.  It was recommended that she follow up with her PMD in 3-4 weeks to recheck her creatinine.    3.  History of DVT 20 years ago    4.  Chronic obstructive pulmonary disease    5.  Health maintenance: Patient does  not smoke currently.  Last colonoscopy was in 2013.  Remains full code.    Jaun Mathew MD  9/12/2017  3:14 PM        EMR Dragon/Transcription disclaimer:   Much of this encounter note is an electronic transcription/translation of spoken language to printed text. The electronic translation of spoken language may permit erroneous, or at times, nonsensical words or phrases to be inadvertently transcribed; Although I have reviewed the note for such errors, some may still exist.

## 2017-09-20 ENCOUNTER — OFFICE VISIT (OUTPATIENT)
Dept: FAMILY MEDICINE CLINIC | Facility: CLINIC | Age: 77
End: 2017-09-20

## 2017-09-20 VITALS
OXYGEN SATURATION: 96 % | DIASTOLIC BLOOD PRESSURE: 66 MMHG | WEIGHT: 119.8 LBS | HEIGHT: 58 IN | BODY MASS INDEX: 25.15 KG/M2 | HEART RATE: 110 BPM | SYSTOLIC BLOOD PRESSURE: 140 MMHG | TEMPERATURE: 98.3 F

## 2017-09-20 DIAGNOSIS — M26.629 TMJ TENDERNESS: Primary | ICD-10-CM

## 2017-09-20 DIAGNOSIS — I10 ESSENTIAL HYPERTENSION: ICD-10-CM

## 2017-09-20 DIAGNOSIS — N19 RENAL FAILURE: ICD-10-CM

## 2017-09-20 PROCEDURE — 96372 THER/PROPH/DIAG INJ SC/IM: CPT | Performed by: FAMILY MEDICINE

## 2017-09-20 PROCEDURE — 99214 OFFICE O/P EST MOD 30 MIN: CPT | Performed by: FAMILY MEDICINE

## 2017-09-20 RX ORDER — POTASSIUM CHLORIDE 750 MG/1
10 CAPSULE, EXTENDED RELEASE ORAL DAILY
COMMUNITY
Start: 2017-09-10 | End: 2018-04-23

## 2017-09-20 RX ORDER — LISINOPRIL 20 MG/1
TABLET ORAL
Qty: 30 TABLET | Refills: 0 | Status: SHIPPED | OUTPATIENT
Start: 2017-09-20 | End: 2017-11-09 | Stop reason: SDUPTHER

## 2017-09-20 RX ORDER — TRIAMCINOLONE ACETONIDE 40 MG/ML
80 INJECTION, SUSPENSION INTRA-ARTICULAR; INTRAMUSCULAR ONCE
Status: COMPLETED | OUTPATIENT
Start: 2017-09-20 | End: 2017-09-20

## 2017-09-20 RX ADMIN — TRIAMCINOLONE ACETONIDE 80 MG: 40 INJECTION, SUSPENSION INTRA-ARTICULAR; INTRAMUSCULAR at 17:12

## 2017-09-20 NOTE — PROGRESS NOTES
Subjective   Tamara Singh is a 77 y.o. female.     History of Present Illness     Right ear pain several days. Swelled anterior ear but better this am.  she sees hematologist.  Recent labs show diminished renal function and she is to follow up with me    Review of Systems   Constitutional: Negative for chills, fatigue and fever.   HENT: Positive for ear pain and facial swelling. Negative for congestion, ear discharge, hearing loss, postnasal drip, rhinorrhea, sinus pressure, sore throat, trouble swallowing and voice change.    Eyes: Negative for discharge, redness and visual disturbance.   Respiratory: Negative for cough, chest tightness, shortness of breath and wheezing.    Cardiovascular: Negative for chest pain and palpitations.   Gastrointestinal: Negative for abdominal pain, blood in stool, constipation, diarrhea, nausea and vomiting.   Endocrine: Negative for polydipsia and polyuria.   Genitourinary: Negative for dysuria, flank pain, hematuria and urgency.   Musculoskeletal: Negative for arthralgias, back pain, joint swelling and myalgias.   Skin: Negative for rash.   Neurological: Negative for dizziness, weakness, numbness and headaches.   Hematological: Negative for adenopathy.   Psychiatric/Behavioral: Negative for confusion and sleep disturbance. The patient is not nervous/anxious.        Objective   Physical Exam   Constitutional: She is oriented to person, place, and time. She appears well-developed and well-nourished.   HENT:   Head: Normocephalic and atraumatic.   Right Ear: External ear normal.   Left Ear: External ear normal.   Nose: Nose normal.   Tm's are fine  Exquisite tenderness right tmj   Eyes: Conjunctivae and EOM are normal. Pupils are equal, round, and reactive to light.   Neck: Normal range of motion.   Pulmonary/Chest: Effort normal.   Musculoskeletal: Normal range of motion.   Neurological: She is alert and oriented to person, place, and time.   Psychiatric: She has a normal  mood and affect. Her behavior is normal. Judgment and thought content normal.   Nursing note and vitals reviewed.      Assessment/Plan   Tamara was seen today for earache.    Diagnoses and all orders for this visit:    TMJ tenderness  -     triamcinolone acetonide (KENALOG-40) injection 80 mg; Inject 2 mL into the shoulder, thigh, or buttocks 1 (One) Time.    Essential hypertension  -     lisinopril (PRINIVIL,ZESTRIL) 20 MG tablet; 1 tablet daily.    Renal failure    no nsiads, told she can take tylenol for her tmj    She says she needs the lasix and potassium    Hot water bottle to jaw, don't chew gum or eat hard foods    She doesn't have new dentures    Return 3 weeks.  Recheck renal function.

## 2017-10-09 RX ORDER — IPRATROPIUM BROMIDE AND ALBUTEROL SULFATE 2.5; .5 MG/3ML; MG/3ML
3 SOLUTION RESPIRATORY (INHALATION)
Qty: 360 ML | Refills: 11 | Status: SHIPPED | OUTPATIENT
Start: 2017-10-09 | End: 2019-01-27 | Stop reason: SDUPTHER

## 2017-10-11 ENCOUNTER — OFFICE VISIT (OUTPATIENT)
Dept: FAMILY MEDICINE CLINIC | Facility: CLINIC | Age: 77
End: 2017-10-11

## 2017-10-11 VITALS
DIASTOLIC BLOOD PRESSURE: 62 MMHG | HEART RATE: 115 BPM | OXYGEN SATURATION: 96 % | BODY MASS INDEX: 25.57 KG/M2 | HEIGHT: 58 IN | SYSTOLIC BLOOD PRESSURE: 138 MMHG | WEIGHT: 121.8 LBS | TEMPERATURE: 98.2 F

## 2017-10-11 DIAGNOSIS — N17.9 ACUTE RENAL FAILURE, UNSPECIFIED ACUTE RENAL FAILURE TYPE (HCC): Primary | ICD-10-CM

## 2017-10-11 PROCEDURE — 80048 BASIC METABOLIC PNL TOTAL CA: CPT | Performed by: FAMILY MEDICINE

## 2017-10-11 PROCEDURE — 99213 OFFICE O/P EST LOW 20 MIN: CPT | Performed by: FAMILY MEDICINE

## 2017-10-11 PROCEDURE — 36415 COLL VENOUS BLD VENIPUNCTURE: CPT | Performed by: FAMILY MEDICINE

## 2017-10-11 NOTE — PROGRESS NOTES
Subjective   Tamara Singh is a 77 y.o. female.     History of Present Illness     Doing ok. tmj is better.  Serum creatinine noticed to be 1.7 when seeing her oncologist.   I suspect she was a little dry that day.     She had squamous cell cancer esophagus 2013  bp is ok today, on lisinopril 20mg qd.     Review of Systems   Constitutional: Negative for chills, fatigue and fever.   HENT: Negative for congestion, ear discharge, ear pain, facial swelling, hearing loss, postnasal drip, rhinorrhea, sinus pressure, sore throat, trouble swallowing and voice change.    Eyes: Negative for discharge, redness and visual disturbance.   Respiratory: Negative for cough, chest tightness, shortness of breath and wheezing.    Cardiovascular: Negative for chest pain and palpitations.   Gastrointestinal: Negative for abdominal pain, blood in stool, constipation, diarrhea, nausea and vomiting.   Endocrine: Negative for polydipsia and polyuria.   Genitourinary: Negative for dysuria, flank pain, hematuria and urgency.   Musculoskeletal: Negative for arthralgias, back pain, joint swelling and myalgias.   Skin: Negative for rash.   Neurological: Negative for dizziness, weakness, numbness and headaches.   Hematological: Negative for adenopathy.   Psychiatric/Behavioral: Negative for confusion and sleep disturbance. The patient is not nervous/anxious.        Objective   Physical Exam   Constitutional: She is oriented to person, place, and time. She appears well-developed and well-nourished.   HENT:   Head: Normocephalic and atraumatic.   Right Ear: External ear normal.   Left Ear: External ear normal.   Nose: Nose normal.   Eyes: Conjunctivae and EOM are normal. Pupils are equal, round, and reactive to light.   Neck: Normal range of motion.   Pulmonary/Chest: Effort normal.   Musculoskeletal: Normal range of motion.   Neurological: She is alert and oriented to person, place, and time.   Psychiatric: She has a normal mood and  affect. Her behavior is normal. Judgment and thought content normal.   Nursing note and vitals reviewed.      Assessment/Plan   Tamara was seen today for follow-up.    Diagnoses and all orders for this visit:    Acute renal failure, unspecified acute renal failure type  -     Basic Metabolic Panel        Bmp today.  If renal function worse will send to nephrology

## 2017-10-12 ENCOUNTER — TELEPHONE (OUTPATIENT)
Dept: FAMILY MEDICINE CLINIC | Facility: CLINIC | Age: 77
End: 2017-10-12

## 2017-10-12 LAB
ANION GAP SERPL CALCULATED.3IONS-SCNC: 13 MMOL/L (ref 5–15)
BUN BLD-MCNC: 18 MG/DL (ref 7–21)
BUN/CREAT SERPL: 15.9 (ref 7–25)
CALCIUM SPEC-SCNC: 9.8 MG/DL (ref 8.4–10.2)
CHLORIDE SERPL-SCNC: 95 MMOL/L (ref 95–110)
CO2 SERPL-SCNC: 25 MMOL/L (ref 22–31)
CREAT BLD-MCNC: 1.13 MG/DL (ref 0.5–1)
GFR SERPL CREATININE-BSD FRML MDRD: 47 ML/MIN/1.73 (ref 39–90)
GLUCOSE BLD-MCNC: 113 MG/DL (ref 60–100)
POTASSIUM BLD-SCNC: 5.1 MMOL/L (ref 3.5–5.1)
SODIUM BLD-SCNC: 133 MMOL/L (ref 137–145)

## 2017-10-12 RX ORDER — ALBUTEROL SULFATE 90 UG/1
2 AEROSOL, METERED RESPIRATORY (INHALATION) EVERY 6 HOURS PRN
Qty: 18 G | Refills: 11 | Status: SHIPPED | OUTPATIENT
Start: 2017-10-12 | End: 2019-06-25 | Stop reason: SDUPTHER

## 2017-11-09 DIAGNOSIS — I10 ESSENTIAL HYPERTENSION: ICD-10-CM

## 2017-11-13 RX ORDER — LISINOPRIL 20 MG/1
TABLET ORAL
Qty: 30 TABLET | Refills: 11 | Status: SHIPPED | OUTPATIENT
Start: 2017-11-13 | End: 2018-04-11 | Stop reason: SDUPTHER

## 2018-01-22 ENCOUNTER — TELEPHONE (OUTPATIENT)
Dept: FAMILY MEDICINE CLINIC | Facility: CLINIC | Age: 78
End: 2018-01-22

## 2018-01-22 NOTE — TELEPHONE ENCOUNTER
DAUGHTER CALLED.    ASKED FOR A LETTER TO THE HOUSING AUTHORITY.    PERSON WHO HELPS TAKE CARE OF HER BRINGS HER DOGS OVER TO VISIT.    CAN YOU WRITE A LETTER TO ALLOW HER TO HAVE VISITS WITH THESE DOGS. FAMILY BELIEVES IT HELPS RELIEVE HER STRESS.    SHE IS NOT ABLE TO GET OUT AND GO TO SOMEONE ELSE'S HOUSE TO VISIT THE DOGS.

## 2018-03-15 ENCOUNTER — OFFICE VISIT (OUTPATIENT)
Dept: FAMILY MEDICINE CLINIC | Facility: CLINIC | Age: 78
End: 2018-03-15

## 2018-03-15 VITALS
OXYGEN SATURATION: 91 % | HEIGHT: 58 IN | SYSTOLIC BLOOD PRESSURE: 122 MMHG | TEMPERATURE: 99.4 F | DIASTOLIC BLOOD PRESSURE: 60 MMHG | HEART RATE: 117 BPM | BODY MASS INDEX: 23.51 KG/M2 | WEIGHT: 112 LBS

## 2018-03-15 DIAGNOSIS — R05.9 COUGH: ICD-10-CM

## 2018-03-15 DIAGNOSIS — J44.1 ACUTE EXACERBATION OF CHRONIC OBSTRUCTIVE PULMONARY DISEASE (COPD) (HCC): Primary | ICD-10-CM

## 2018-03-15 PROCEDURE — 99214 OFFICE O/P EST MOD 30 MIN: CPT | Performed by: FAMILY MEDICINE

## 2018-03-15 PROCEDURE — 96372 THER/PROPH/DIAG INJ SC/IM: CPT | Performed by: FAMILY MEDICINE

## 2018-03-15 RX ORDER — AZITHROMYCIN 250 MG/1
TABLET, FILM COATED ORAL
Qty: 6 TABLET | Refills: 0 | Status: SHIPPED | OUTPATIENT
Start: 2018-03-15 | End: 2018-04-02

## 2018-03-15 RX ORDER — TRIAMCINOLONE ACETONIDE 40 MG/ML
80 INJECTION, SUSPENSION INTRA-ARTICULAR; INTRAMUSCULAR ONCE
Status: COMPLETED | OUTPATIENT
Start: 2018-03-15 | End: 2018-03-15

## 2018-03-15 RX ORDER — CEFUROXIME AXETIL 500 MG/1
500 TABLET ORAL 2 TIMES DAILY
Qty: 20 TABLET | Refills: 0 | Status: SHIPPED | OUTPATIENT
Start: 2018-03-15 | End: 2018-04-02

## 2018-03-15 RX ADMIN — TRIAMCINOLONE ACETONIDE 80 MG: 40 INJECTION, SUSPENSION INTRA-ARTICULAR; INTRAMUSCULAR at 17:04

## 2018-03-15 NOTE — PROGRESS NOTES
Subjective   Tamara Singh is a 77 y.o. female.     History of Present Illness     2 weeks increasing cough and feeling bad.   Has home neb machine and medicines using qid.    Review of Systems   Constitutional: Negative for chills, fatigue and fever.   HENT: Positive for congestion, postnasal drip, sore throat and voice change. Negative for ear discharge, ear pain, facial swelling, hearing loss, rhinorrhea, sinus pressure and trouble swallowing.    Eyes: Negative for discharge, redness and visual disturbance.   Respiratory: Positive for cough, shortness of breath and wheezing. Negative for chest tightness.    Cardiovascular: Negative for chest pain and palpitations.   Gastrointestinal: Negative for abdominal pain, blood in stool, constipation, diarrhea, nausea and vomiting.   Endocrine: Negative for polydipsia and polyuria.   Genitourinary: Negative for dysuria, flank pain, hematuria and urgency.   Musculoskeletal: Negative for arthralgias, back pain, joint swelling and myalgias.   Skin: Negative for rash.   Neurological: Negative for dizziness, weakness, numbness and headaches.   Hematological: Negative for adenopathy.   Psychiatric/Behavioral: Negative for confusion and sleep disturbance. The patient is not nervous/anxious.        Objective   Physical Exam   Constitutional: She is oriented to person, place, and time. She appears well-developed and well-nourished.   HENT:   Head: Normocephalic and atraumatic.   Right Ear: External ear normal.   Left Ear: External ear normal.   Nose: Nose normal.   Eyes: Conjunctivae and EOM are normal. Pupils are equal, round, and reactive to light.   Neck: Normal range of motion.   Cardiovascular: Normal rate and regular rhythm.    Pulmonary/Chest: Effort normal. She has wheezes.   tight   Abdominal: Soft.   Musculoskeletal: Normal range of motion.   Neurological: She is alert and oriented to person, place, and time.   Psychiatric: She has a normal mood and affect. Her  behavior is normal. Judgment and thought content normal.   Nursing note and vitals reviewed.      Assessment/Plan   Tamara was seen today for uri and cough.    Diagnoses and all orders for this visit:    Acute exacerbation of chronic obstructive pulmonary disease (COPD)  -     triamcinolone acetonide (KENALOG-40) injection 80 mg; Inject 2 mL into the shoulder, thigh, or buttocks 1 (One) Time.    Cough  -     XR Chest PA & Lateral (In Office)    Other orders  -     cefuroxime (CEFTIN) 500 MG tablet; Take 1 tablet by mouth 2 (Two) Times a Day.  -     azithromycin (ZITHROMAX) 250 MG tablet; Take 2 tablets the first day, then 1 tablet daily for 4 days.      No obvious pneumonia on xray but I want to cover her anyway.  Go to er if worsens

## 2018-03-19 DIAGNOSIS — R93.89 ABNORMAL CHEST X-RAY: Primary | ICD-10-CM

## 2018-03-22 ENCOUNTER — OFFICE VISIT (OUTPATIENT)
Dept: PULMONOLOGY | Facility: CLINIC | Age: 78
End: 2018-03-22

## 2018-03-22 VITALS
OXYGEN SATURATION: 94 % | HEIGHT: 58 IN | WEIGHT: 119.1 LBS | HEART RATE: 68 BPM | SYSTOLIC BLOOD PRESSURE: 129 MMHG | BODY MASS INDEX: 25 KG/M2 | DIASTOLIC BLOOD PRESSURE: 66 MMHG

## 2018-03-22 DIAGNOSIS — Z86.11 HISTORY OF TUBERCULOSIS: ICD-10-CM

## 2018-03-22 DIAGNOSIS — J43.1 PANLOBULAR EMPHYSEMA (HCC): Primary | ICD-10-CM

## 2018-03-22 DIAGNOSIS — R93.89 ABNORMAL CHEST X-RAY: ICD-10-CM

## 2018-03-22 DIAGNOSIS — Z85.01 HISTORY OF ESOPHAGEAL CANCER: ICD-10-CM

## 2018-03-22 DIAGNOSIS — R09.02 HYPOXEMIA: ICD-10-CM

## 2018-03-22 PROCEDURE — 99204 OFFICE O/P NEW MOD 45 MIN: CPT | Performed by: INTERNAL MEDICINE

## 2018-03-22 NOTE — PROGRESS NOTES
Subjective   Tamara Singh is a 77 y.o. female.   Chief complaint is shortness of breath and abnormal chest x-ray  History of Present Illness   This 77-year-old lady was referred for evaluation of an abnormal chest x-ray.  She has emphysema and hypoxemia with chronic shortness of breath cough and frequent pneumonias.  She has a history of tuberculosis which is called scarring in the right lung.  She's had a history of esophageal cancer status post radiation and chemotherapy.  She had an abnormality noted on her chest x-ray recently.  She does have cough and dyspnea on walking a few 100 feet.  She also has dizziness on standing.  She has a history of high blood pressure and allergies.  Surgical history cholecystectomy.  Medication allergies none known.  Medications please see her list.  Family history is positive for cancer diabetes high blood pressure and stroke.  Social history one pack per day for 60 years  The following portions of the patient's history were reviewed and updated as appropriate: allergies, current medications, past family history, past medical history, past social history, past surgical history and problem list.    Review of Systems   Constitutional: Positive for activity change and fatigue. Negative for chills, fever and unexpected weight change.   HENT: Negative for congestion, dental problem, ear discharge, ear pain, facial swelling, hearing loss, nosebleeds, postnasal drip, rhinorrhea, sinus pressure, sneezing, sore throat, tinnitus, trouble swallowing and voice change.    Eyes: Negative.  Negative for photophobia, pain, discharge, redness, itching and visual disturbance.   Respiratory: Positive for cough, chest tightness, shortness of breath and wheezing.    Cardiovascular: Negative for chest pain, palpitations and leg swelling.   Gastrointestinal: Negative for abdominal distention, abdominal pain and vomiting.   Endocrine: Negative.  Negative for cold intolerance, heat intolerance,  polydipsia and polyphagia.   Genitourinary: Negative.  Negative for decreased urine volume, dysuria, enuresis, flank pain, frequency, hematuria and urgency.   Musculoskeletal: Negative for arthralgias, back pain, gait problem, joint swelling, myalgias and neck pain.   Skin: Negative for color change, pallor, rash and wound.   Allergic/Immunologic: Negative.  Negative for environmental allergies, food allergies and immunocompromised state.   Neurological: Negative.  Negative for dizziness, tremors, seizures, syncope, facial asymmetry, speech difficulty, weakness, light-headedness, numbness and headaches.   Hematological: Negative for adenopathy. Does not bruise/bleed easily.   Psychiatric/Behavioral: Negative for agitation, behavioral problems, confusion, decreased concentration, hallucinations and self-injury. The patient is not hyperactive.    All other systems reviewed and are negative.      Objective   Physical Exam   Constitutional: She appears well-developed and well-nourished. She appears distressed ( Chronically ill-appearing dyspneic white female in a wheelchair).   HENT:   Head: Normocephalic.   Mouth/Throat: No oropharyngeal exudate.   Eyes: Conjunctivae are normal. Pupils are equal, round, and reactive to light. Right eye exhibits no discharge. Left eye exhibits no discharge. No scleral icterus.   Neck: Normal range of motion. Neck supple. No JVD present. No tracheal deviation present. No thyromegaly present.   Cardiovascular: Normal rate, regular rhythm, normal heart sounds and intact distal pulses.  Exam reveals no gallop and no friction rub.    No murmur heard.  Pulmonary/Chest: Breath sounds normal. She is in respiratory distress. She has no wheezes ( Diminished breath sounds prolonged expiration). She has no rales. She exhibits no tenderness.   Abdominal: Bowel sounds are normal. She exhibits no distension and no mass. There is no tenderness. There is no guarding.   Musculoskeletal: She exhibits no  tenderness or deformity.   Lymphadenopathy:     She has no cervical adenopathy.   Neurological: She is alert. She has normal reflexes. She displays normal reflexes. No cranial nerve deficit. She exhibits normal muscle tone. Coordination normal.   Skin: Skin is warm and dry. No rash noted. She is not diaphoretic. No pallor.   Psychiatric: She has a normal mood and affect. Her behavior is normal. Judgment and thought content normal.    chest x-ray revealed destroyed right upper lung with retraction from the hilar area to the upper lobe.  There was a pleural-based thickened area in the apex.  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  We can follow her chest x-ray but at this point would not suggest any invasive workup.  This patient is not an operative candidate and not a good candidate for any invasive therapy.  We'll start anoro, ask her to use oxygen during exercise and see her back in 2 weeks          This document has been electronically signed by Farooq Coello MD on March 22, 2018 9:53 AM

## 2018-04-02 ENCOUNTER — OFFICE VISIT (OUTPATIENT)
Dept: FAMILY MEDICINE CLINIC | Facility: CLINIC | Age: 78
End: 2018-04-02

## 2018-04-02 VITALS
SYSTOLIC BLOOD PRESSURE: 150 MMHG | WEIGHT: 118.4 LBS | HEART RATE: 94 BPM | TEMPERATURE: 98 F | BODY MASS INDEX: 24.86 KG/M2 | DIASTOLIC BLOOD PRESSURE: 80 MMHG | HEIGHT: 58 IN | OXYGEN SATURATION: 94 %

## 2018-04-02 DIAGNOSIS — M62.838 MUSCLE SPASM: Primary | ICD-10-CM

## 2018-04-02 DIAGNOSIS — E55.9 VITAMIN D DEFICIENCY: ICD-10-CM

## 2018-04-02 PROCEDURE — 83735 ASSAY OF MAGNESIUM: CPT | Performed by: FAMILY MEDICINE

## 2018-04-02 PROCEDURE — 36415 COLL VENOUS BLD VENIPUNCTURE: CPT | Performed by: FAMILY MEDICINE

## 2018-04-02 PROCEDURE — 82306 VITAMIN D 25 HYDROXY: CPT | Performed by: FAMILY MEDICINE

## 2018-04-02 PROCEDURE — 80048 BASIC METABOLIC PNL TOTAL CA: CPT | Performed by: FAMILY MEDICINE

## 2018-04-02 PROCEDURE — 99213 OFFICE O/P EST LOW 20 MIN: CPT | Performed by: FAMILY MEDICINE

## 2018-04-02 NOTE — PROGRESS NOTES
Subjective   Tamara Singh is a 77 y.o. female.     History of Present Illness     About every other day cramps in arms.  Points to thumb and extensor muscles both arms.   Also in legs at night.  No lasix since Saturday.   Has been ongoing off and on months?  Cant attribute symptoms to when taking lasix?  No herbs/supplements otc  History of vit d deficiency and is taking vit d supplement otc    Review of Systems   Constitutional: Negative for chills, fatigue and fever.   HENT: Negative for congestion, ear discharge, ear pain, facial swelling, hearing loss, postnasal drip, rhinorrhea, sinus pressure, sore throat, trouble swallowing and voice change.    Eyes: Negative for discharge, redness and visual disturbance.   Respiratory: Negative for cough, chest tightness, shortness of breath and wheezing.    Cardiovascular: Negative for chest pain and palpitations.   Gastrointestinal: Negative for abdominal pain, blood in stool, constipation, diarrhea, nausea and vomiting.   Endocrine: Negative for polydipsia and polyuria.   Genitourinary: Negative for dysuria, flank pain, hematuria and urgency.   Musculoskeletal: Negative for arthralgias, back pain, joint swelling and myalgias.   Skin: Negative for rash.   Neurological: Negative for dizziness, weakness, numbness and headaches.   Hematological: Negative for adenopathy.   Psychiatric/Behavioral: Negative for confusion and sleep disturbance. The patient is not nervous/anxious.        Objective   Physical Exam   Constitutional: She is oriented to person, place, and time. She appears well-developed and well-nourished.   HENT:   Head: Normocephalic and atraumatic.   Right Ear: External ear normal.   Left Ear: External ear normal.   Nose: Nose normal.   Eyes: Conjunctivae and EOM are normal. Pupils are equal, round, and reactive to light.   Neck: Normal range of motion.   Pulmonary/Chest: Effort normal.   Musculoskeletal: Normal range of motion.   Some discomfort at  lateral epicondyle both arms   Neurological: She is alert and oriented to person, place, and time.   Psychiatric: She has a normal mood and affect. Her behavior is normal. Judgment and thought content normal.   Nursing note and vitals reviewed.      Assessment/Plan   Tamara was seen today for hand pain and arm pain.    Diagnoses and all orders for this visit:    Muscle spasm  -     Basic Metabolic Panel  -     Magnesium    Vitamin D deficiency  -     Vitamin D 25 Hydroxy    will call with lab results.

## 2018-04-03 LAB
25(OH)D3 SERPL-MCNC: 58.6 NG/ML (ref 30–100)
ANION GAP SERPL CALCULATED.3IONS-SCNC: 14 MMOL/L (ref 5–15)
BUN BLD-MCNC: 24 MG/DL (ref 7–21)
BUN/CREAT SERPL: 23.1 (ref 7–25)
CALCIUM SPEC-SCNC: 9.7 MG/DL (ref 8.4–10.2)
CHLORIDE SERPL-SCNC: 101 MMOL/L (ref 95–110)
CO2 SERPL-SCNC: 27 MMOL/L (ref 22–31)
CREAT BLD-MCNC: 1.04 MG/DL (ref 0.5–1)
GFR SERPL CREATININE-BSD FRML MDRD: 52 ML/MIN/1.73 (ref 39–90)
GLUCOSE BLD-MCNC: 90 MG/DL (ref 60–100)
MAGNESIUM SERPL-MCNC: 2.3 MG/DL (ref 1.6–2.3)
POTASSIUM BLD-SCNC: 5.2 MMOL/L (ref 3.5–5.1)
SODIUM BLD-SCNC: 142 MMOL/L (ref 137–145)

## 2018-04-09 ENCOUNTER — OFFICE VISIT (OUTPATIENT)
Dept: PULMONOLOGY | Facility: CLINIC | Age: 78
End: 2018-04-09

## 2018-04-09 VITALS
HEIGHT: 58 IN | HEART RATE: 103 BPM | BODY MASS INDEX: 24.81 KG/M2 | OXYGEN SATURATION: 93 % | WEIGHT: 118.2 LBS | SYSTOLIC BLOOD PRESSURE: 128 MMHG | DIASTOLIC BLOOD PRESSURE: 70 MMHG

## 2018-04-09 DIAGNOSIS — R93.89 ABNORMAL CHEST X-RAY: ICD-10-CM

## 2018-04-09 DIAGNOSIS — J41.8 MIXED SIMPLE AND MUCOPURULENT CHRONIC BRONCHITIS (HCC): Primary | ICD-10-CM

## 2018-04-09 PROCEDURE — 99213 OFFICE O/P EST LOW 20 MIN: CPT | Performed by: INTERNAL MEDICINE

## 2018-04-09 NOTE — PROGRESS NOTES
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  Her breathing is improved on bronchodilator    ROS    Constitutional-no night sweats weight loss headaches  GI no abdominal pain nausea or diarrhea  Neuro no seizure or neurologic deficits  Musculoskeletal no deformity or joint pain   no dysuria or hematuria  Skin no rash or other lesions  All other systems reviewed and were negative except for the above.      Physical Exam  /70 (BP Location: Left arm, Patient Position: Sitting, Cuff Size: Adult)   Pulse 103   Ht 147.3 cm (58\")   Wt 53.6 kg (118 lb 3.2 oz)   SpO2 93%   BMI 24.70 kg/m²   Vital signs as above  Pupils equally round and reactive to light and accommodation, neck no JVD or adenopathy.  Cardiovascular regular rhythm and rate no murmur or gallop.  Abdomen soft no organomegaly tenderness.  Extremities no clubbing cyanosis or edema.  No cervical adenopathy.  No skin rash.  Neurologic good strength bilaterally without deficits  Lungs reveal diminished breath sounds with mild wheezes    Impression COPD, destroyed lung    Plan breo inhaler, return in 3 months          This document has been electronically signed by Farooq Coello MD on April 9, 2018 2:48 PM      "

## 2018-04-11 DIAGNOSIS — I10 ESSENTIAL HYPERTENSION: ICD-10-CM

## 2018-04-11 RX ORDER — LISINOPRIL 20 MG/1
20 TABLET ORAL DAILY
Qty: 90 TABLET | Refills: 3 | Status: SHIPPED | OUTPATIENT
Start: 2018-04-11 | End: 2019-04-14 | Stop reason: SDUPTHER

## 2018-06-11 ENCOUNTER — OFFICE VISIT (OUTPATIENT)
Dept: FAMILY MEDICINE CLINIC | Facility: CLINIC | Age: 78
End: 2018-06-11

## 2018-06-11 VITALS
TEMPERATURE: 99 F | HEART RATE: 117 BPM | DIASTOLIC BLOOD PRESSURE: 60 MMHG | SYSTOLIC BLOOD PRESSURE: 120 MMHG | OXYGEN SATURATION: 92 %

## 2018-06-11 DIAGNOSIS — J30.2 ACUTE SEASONAL ALLERGIC RHINITIS, UNSPECIFIED TRIGGER: Primary | ICD-10-CM

## 2018-06-11 DIAGNOSIS — I10 ESSENTIAL HYPERTENSION: ICD-10-CM

## 2018-06-11 DIAGNOSIS — J44.9 CHRONIC OBSTRUCTIVE PULMONARY DISEASE, UNSPECIFIED COPD TYPE (HCC): ICD-10-CM

## 2018-06-11 DIAGNOSIS — J44.1 ACUTE EXACERBATION OF CHRONIC OBSTRUCTIVE PULMONARY DISEASE (COPD) (HCC): ICD-10-CM

## 2018-06-11 PROCEDURE — 99214 OFFICE O/P EST MOD 30 MIN: CPT | Performed by: FAMILY MEDICINE

## 2018-06-11 PROCEDURE — 96372 THER/PROPH/DIAG INJ SC/IM: CPT | Performed by: FAMILY MEDICINE

## 2018-06-11 RX ORDER — AZITHROMYCIN 250 MG/1
TABLET, FILM COATED ORAL
Qty: 6 TABLET | Refills: 0 | Status: SHIPPED | OUTPATIENT
Start: 2018-06-11 | End: 2019-06-25

## 2018-06-11 RX ORDER — PROMETHAZINE HYDROCHLORIDE AND CODEINE PHOSPHATE 6.25; 1 MG/5ML; MG/5ML
5 SYRUP ORAL EVERY 6 HOURS PRN
Qty: 240 ML | Refills: 0 | Status: SHIPPED | OUTPATIENT
Start: 2018-06-11 | End: 2019-06-25

## 2018-06-11 RX ORDER — TRIAMCINOLONE ACETONIDE 40 MG/ML
80 INJECTION, SUSPENSION INTRA-ARTICULAR; INTRAMUSCULAR ONCE
Status: COMPLETED | OUTPATIENT
Start: 2018-06-11 | End: 2018-06-11

## 2018-06-11 RX ORDER — LORATADINE 10 MG/1
10 TABLET ORAL DAILY
Qty: 30 TABLET | Refills: 11 | Status: SHIPPED | OUTPATIENT
Start: 2018-06-11 | End: 2019-06-25 | Stop reason: SDUPTHER

## 2018-06-11 RX ADMIN — TRIAMCINOLONE ACETONIDE 80 MG: 40 INJECTION, SUSPENSION INTRA-ARTICULAR; INTRAMUSCULAR at 16:25

## 2018-06-11 NOTE — PROGRESS NOTES
Subjective   Tamara Singh is a 78 y.o. female.     History of Present Illness   /60 (BP Location: Left arm, Patient Position: Sitting, Cuff Size: Adult)   Pulse 117   Temp 99 °F (37.2 °C) (Temporal Artery )   SpO2 92%     Increasing cough and sob.  Yellow phlegm.    1 week    Review of Systems   Constitutional: Negative for chills, fatigue and fever.   HENT: Negative for congestion, ear discharge, ear pain, facial swelling, hearing loss, postnasal drip, rhinorrhea, sinus pressure, sore throat, trouble swallowing and voice change.    Eyes: Negative for discharge, redness and visual disturbance.   Respiratory: Positive for cough and shortness of breath. Negative for chest tightness and wheezing.    Cardiovascular: Negative for chest pain and palpitations.   Gastrointestinal: Negative for abdominal pain, blood in stool, constipation, diarrhea, nausea and vomiting.   Endocrine: Negative for polydipsia and polyuria.   Genitourinary: Negative for dysuria, flank pain, hematuria and urgency.   Musculoskeletal: Negative for arthralgias, back pain, joint swelling and myalgias.   Skin: Negative for rash.   Neurological: Negative for dizziness, weakness, numbness and headaches.   Hematological: Negative for adenopathy.   Psychiatric/Behavioral: Negative for confusion and sleep disturbance. The patient is not nervous/anxious.        Objective   Physical Exam   Constitutional: She is oriented to person, place, and time. She appears well-developed and well-nourished.   HENT:   Head: Normocephalic and atraumatic.   Right Ear: External ear normal.   Left Ear: External ear normal.   Nose: Nose normal.   Mouth/Throat: Oropharynx is clear and moist.   Eyes: Conjunctivae and EOM are normal. Pupils are equal, round, and reactive to light.   Neck: Normal range of motion. Neck supple.   Cardiovascular: Normal rate, regular rhythm and normal heart sounds.  Exam reveals no gallop and no friction rub.    No murmur  heard.  Pulmonary/Chest: Effort normal.   Diminished but clear.   Abdominal: Soft. Bowel sounds are normal. She exhibits no distension. There is no tenderness. There is no rebound and no guarding.   Musculoskeletal: Normal range of motion. She exhibits no edema or deformity.   Neurological: She is alert and oriented to person, place, and time. No cranial nerve deficit.   Skin: Skin is warm and dry. No rash noted. No erythema.   Psychiatric: She has a normal mood and affect. Her behavior is normal. Judgment and thought content normal.   Nursing note and vitals reviewed.      Assessment/Plan   Tamara was seen today for breathing problem, uri and cough.    Diagnoses and all orders for this visit:    Acute seasonal allergic rhinitis, unspecified trigger    Chronic obstructive pulmonary disease, unspecified COPD type  -     triamcinolone acetonide (KENALOG-40) injection 80 mg; Inject 2 mL into the shoulder, thigh, or buttocks 1 (One) Time.    Essential hypertension    Acute exacerbation of chronic obstructive pulmonary disease (COPD)    Other orders  -     loratadine (CLARITIN) 10 MG tablet; Take 1 tablet by mouth Daily.  -     promethazine-codeine (PHENERGAN with CODEINE) 6.25-10 MG/5ML syrup; Take 5 mL by mouth Every 6 (Six) Hours As Needed for Cough.  -     azithromycin (ZITHROMAX) 250 MG tablet; Take 2 tablets the first day, then 1 tablet daily for 4 days.      Symptoms started as allergy and now lungs.    Call if worsens.

## 2018-06-25 RX ORDER — FUROSEMIDE 40 MG/1
TABLET ORAL
Qty: 30 TABLET | Refills: 11 | Status: SHIPPED | OUTPATIENT
Start: 2018-06-25 | End: 2019-09-17 | Stop reason: SDUPTHER

## 2018-06-25 RX ORDER — ALBUTEROL SULFATE 90 UG/1
AEROSOL, METERED RESPIRATORY (INHALATION)
Qty: 1 INHALER | Refills: 11 | Status: SHIPPED | OUTPATIENT
Start: 2018-06-25 | End: 2019-06-25 | Stop reason: SDUPTHER

## 2018-07-30 RX ORDER — IPRATROPIUM/ALBUTEROL SULFATE 20-100 MCG
MIST INHALER (GRAM) INHALATION
Qty: 4 G | Refills: 11 | Status: SHIPPED | OUTPATIENT
Start: 2018-07-30 | End: 2019-09-17 | Stop reason: SDUPTHER

## 2018-10-22 RX ORDER — IPRATROPIUM/ALBUTEROL SULFATE 20-100 MCG
MIST INHALER (GRAM) INHALATION
Qty: 12 G | Refills: 3 | Status: SHIPPED | OUTPATIENT
Start: 2018-10-22 | End: 2019-06-25 | Stop reason: SDUPTHER

## 2019-01-12 NOTE — TELEPHONE ENCOUNTER
Progress Note - Critical Care   Courtney Eubanks 68 y o  female MRN: [id number]  Unit/Bed#: MICU 08 Encounter: [id number]    Attending Physician: Andrew Vogt MD      ______________________________________________________________________  Assessment and Plan:   Principal Problem:    Subdural hematoma, acute ([facility] [address] )  Active Problems:    Acute kidney injury superimposed on chronic kidney disease ([facility] [address] )    [facility]    Secondary hyperparathyroidism of renal origin ([facility] )    Hypothyroidism    Hypertension    Polycythemia vera ([facility] [address] )    Fall    Intraventricular hemorrhage ([facility] )    Diarrhea of presumed infectious origin    Hypokalemia  Resolved Problems:    * No resolved hospital problems  *    65 y/o female s/p syncopal fall in setting of severe diarrhea with SDH on Eliquis    Neuro:   L Temporal SDH/IVH/ Mild TBI- stable on f/u CT head this morning  Elqius on hold but not reversed due to h/o PCV and saddle PE with fall appx 1 wk ago on (1/6)  Given stable CT head this morning, plan per NSg is to resume Eliqius on Monday, 1/14  Continue Keppra for 1 week for seizure prophylaxis  Continue neuro checks, but decrease to q4h  CV:   History of HTN- home amlodipine 10 mg daily and metoprolol 12 5 mg BID on hold  BP systolically in 697D- continue to hold in setting of diarrhea w/ recent volume depletion  H/o SC Vein thrombosis- Resume Eliquis possibly on Monday  ON hold currently due to SDH  Pulm:   History of saddle PE - in setting of polycythemia vera  Eliquis currently on hold  Plan to resume on Monday 1/14 given stable CT head today as recommended by NSg  Currently asymptomatic from this standpoint  Continue aggressive pulmonary toilette/IS/OOB today  GI:   Clostridium difficile causing diarrhea- Started on IV flagyl  This is recurrent as she has had positive stool samples in 4/17 and 10/16  Previously treated with PO vancomycin twice   Will start IV flagyl and PO Refill request from pharmacy.     vancomycin and c/s ID given recurrent disease  Monitor diarrhea- overall this has improved in frequency for patient  :   CKD IV- Cr appears to be at baseline appx 2 5  Follows as outpt with Dr Scarlett Linn  Continue to monitor closely  Decrease Isolyte to 75 ml/hr and start regular diet today  F/E/N:   Decrease IVFs to 75 ml/hr and start regular diet  Hypokalemia- replete and recheck in AM    ID:   Clostridium difficile- please see plan above under GI section  No leukocytosis or fever  Heme:   Anemia- Hgb 9 4 this AM, likely dilutional as pt is 2 liters positive in 24 hours  No active bleeding clinically and CT A/P negative    Endo:   History of hypothyroidism- on home synthroid     Msk/Skin:   Routine skin care  Prophylaxis:  SCDs, start SQH today  Resume Eliquis on Monday  Keppra for 7 days for sz prophylaxis  Disposition:   Transfer to med-surg status today  Code Status: Level 1 - Full Code    Counseling / Coordination of Care  Total time spent today 30 minutes  Greater than 50% of total time was spent with the patient and / or family counseling and / or coordination of care  A description of the counseling / coordination of care: d/w pt and nurse plan of care for the day  ______________________________________________________________________    Chief Complaint: "I'm feeling pretty well"    24 Hour Events: States her diarrhea frequency is much improved, has been on the bedpan twice with loose BMs since arrival  Denies headaches/dizziness/N/V  Wonders how long she will be in the hospital      C  Diff just resulted positive  Review of Systems   Constitutional: Negative  HENT: Negative  Eyes: Negative  Respiratory: Negative  Cardiovascular: Negative  Gastrointestinal: Positive for diarrhea  Negative for abdominal distention, abdominal pain, blood in stool, constipation, nausea and vomiting  Endocrine: Negative  Genitourinary: Negative  Musculoskeletal: Negative  Skin: Negative  Neurological: Negative  Negative for dizziness, speech difficulty, weakness, light-headedness, numbness and headaches  Hematological: Negative  Psychiatric/Behavioral: Negative       ______________________________________________________________________    Physical Exam:     Physical Exam   Constitutional: She is oriented to person, place, and time  She appears well-developed and well-nourished  HENT:   Head: Normocephalic  Eyes: Pupils are equal, round, and reactive to light  Neck: Normal range of motion  Neck supple  Cardiovascular: Normal rate, regular rhythm and normal heart sounds  Pulmonary/Chest: Effort normal and breath sounds normal  No respiratory distress  Abdominal: Soft  Bowel sounds are normal  She exhibits no distension  There is no tenderness  There is no rebound and no guarding    + ileal conduit   Genitourinary: Vagina normal and uterus normal    Musculoskeletal: Normal range of motion  Neurological: She is alert and oriented to person, place, and time  + strength 5/5 B/L UE/LE  + Sensation intact   Skin: Skin is warm and dry  Capillary refill takes less than 2 seconds  Psychiatric: She has a normal mood and affect    ______________________________________________________________________  Vitals:    19 0100 19 0200 19 0300 19 0400   BP: 140/84 119/74 118/70 119/70   Pulse: 62 58 60 58   Resp:       Temp:    98 °F (36 7 °C)   TempSrc:    Oral   SpO2: 95% 95% 94% 94%   Weight:       Height:           Temperature:   Temp (24hrs), Av 9 °F (36 6 °C), Min:97 7 °F (36 5 °C), Max:98 1 °F (36 7 °C)    Current Temperature: 98 °F (36 7 °C)  Weights:   IBW: 45 5 kg    Body mass index is 36 86 kg/m²    Weight (last 2 days)     Date/Time   Weight    19 1730  85 6 (188 71)    19 1519  84 4 (186)    19 1124  84 4 (186)            Hemodynamic Monitoring:  N/A     Non-Invasive/Invasive Ventilation Settings:  Respiratory    Lab Data (Last 4 hours)    None         O2/Vent Data (Last 4 hours)    None              No results found for: PHART, HKR3HVK, PO2ART, JKD9WQH, Y6YJIYHZ, BEART, SOURCE  SpO2: SpO2: 94 %, SpO2 Device: O2 Device: None (Room air)  Intake and Outputs:  I/O       01/10 0701 - 01/11 0700 01/11 0701 - 01/12 0700 01/12 0701 - 01/13 0700    I V  (mL/kg)  1593 8 (18 6)     IV Piggyback  1100     Total Intake(mL/kg)  2693 8 (31 5)     Urine (mL/kg/hr)  500     Total Output   500      Net   +2193 8             Unmeasured Stool Occurrence  1 x         UOP: 40 ml/hr     Nutrition:        Diet Orders            Start     Ordered    01/11/19 1454  Diet NPO; Sips with meds  Diet effective now     Question Answer Comment   Diet Type NPO    NPO Except: Sips with meds    RD to adjust diet per protocol?  No        01/11/19 1455        Labs:     Results from last 7 days  Lab Units 01/12/19  0614 01/11/19  1242   WBC Thousand/uL 4 40 5 43   HEMOGLOBIN g/dL 9 4* 11 1*   HEMATOCRIT % 28 8* 33 5*   PLATELETS Thousands/uL 253 341   MONO PCT %  --  4       Results from last 7 days  Lab Units 01/12/19  0614 01/11/19  1242   SODIUM mmol/L 141 139   POTASSIUM mmol/L 3 6 3 0*   CHLORIDE mmol/L 112* 108   CO2 mmol/L 20* 19*   ANION GAP mmol/L 9 12   BUN mg/dL 27* 36*   CREATININE mg/dL 2 51* 3 08*   CALCIUM mg/dL 7 6* 8 7   ALT U/L  --  11*   AST U/L  --  20   ALK PHOS U/L  --  94   ALBUMIN g/dL  --  3 1*   TOTAL BILIRUBIN mg/dL  --  0 40       Results from last 7 days  Lab Units 01/11/19  1242   MAGNESIUM mg/dL 1 8   PHOSPHORUS mg/dL 2 7        Results from last 7 days  Lab Units 01/12/19  0052   INR  1 28*   PTT seconds 37       Results from last 7 days  Lab Units 01/11/19  1242   TROPONIN I ng/mL <0 02         ABG:No results found for: PHART, VJF9XVT, PO2ART, YBN1DQY, N9FUHXVP, BEART, SOURCE  VBG:        No results found for: [facility]   Imaging:   Ct Abdomen Pelvis Wo Contrast    Result Date: 1/11/2019  Impression: No acute intra-abdominal abnormality  Nonobstructing right renal calculi and marked left renal atrophy again noted  6 9 cm right adnexal cyst, grossly stable in retrospect  This can be evaluated with nonemergent pelvic ultrasound  Stable appearance of post cystectomy and ileal conduit diversion  Limited study without oral or IV contrast  Workstation performed: [id number]     Xr Hip/pelv 2-3 Vws Right If Performed    Result Date: 1/11/2019  Impression: No acute osseous abnormality  Workstation performed: [id number]     Ct Head Wo Contrast    Result Date: 1/12/2019  Impression: Stable subdural hemorrhage along left tentorium  No significant mass effect  Slight interval washout of hemorrhagic blood products seen within the occipital horn of the right ventricle  No large delayed intracranial hemorrhage  Workstation performed: [id number]     Ct Head Wo Contrast    Result Date: 1/11/2019  Impression: Trace left temporal acute subdural hemorrhage maximum thickness 3 mm extending along the peripheral left tentorium cerebelli  Trace intraventricular hemorrhage in the dependent right occipital horn  Small right posterior superior parietal scalp contusion/hematoma  No skull fracture  Chronic microangiopathy  I personally discussed this study with Holly Dukes on 1/11/2019 at 1:36 PM  Workstation performed: [id number]    I have personally reviewed pertinent reports  EKG: no new      Results from last 7 days  Lab Units 01/11/19 2006   C DIFF TOXIN B  POSITIVE for C difficle toxin by PCR  *     Allergies: Allergies   Allergen Reactions    Chlorhexidine Rash     petichi like rash when using chlorhexidine swabs prior to IV        Medications:   Scheduled Meds:  Current Facility-Administered Medications:  acetaminophen 975 mg Oral Q8H [address] VICTORINA Martinez    calcitriol 0 25 mcg Oral Once per day on Mon Wed Fri VICTORINA Martinez    cholecalciferol 1,000 Units Oral Daily VICTORINA Hernandez    citalopram 20 mg Oral Daily Natasha WONG [place], CRTOMÁS    levETIRAcetam 500 mg Oral Q12H [address] Chaz Allen, CRNP    levothyroxine 75 mcg Oral Early Morning Natasha Maya, VICTORINA    melatonin 6 mg Oral HS Natasha Maya, CRTOMÁS    metroNIDAZOLE 500 mg Intravenous Q8H Cher Avila PA-C    multi-electrolyte 125 mL/hr Intravenous Continuous Debo FreesVICTORINA Last Rate: 125 mL/hr (01/12/19 0109)   ondansetron 4 mg Intravenous Q8H PRN Natasha JUDITH Maya, FARIDANP    saccharomyces boulardii 250 mg Oral BID Natasha JUDITH Maya, CRNP    sodium bicarbonate 650 mg Oral BID after meals NatashaVICTORINA Mcduffie      Continuous Infusions:  multi-electrolyte 125 mL/hr Last Rate: 125 mL/hr (01/12/19 0109)     PRN Meds:    ondansetron 4 mg Q8H PRN     VTE Pharmacologic Prophylaxis: Pharmacologic VTE Prophylaxis contraindicated due to ICH/SDH  VTE Mechanical Prophylaxis: sequential compression device     Invasive lines and devices: Invasive Devices     Peripheral Intravenous Line            Peripheral IV 01/11/19 Right Antecubital less than 1 day    Peripheral IV 01/11/19 Right Forearm less than 1 day          Drain            Urostomy Ileal conduit  days                     Portions of the record may have been created with voice recognition software  Occasional wrong word or "sound a like" substitutions may have occurred due to the inherent limitations of voice recognition software  Read the chart carefully and recognize, using context, where substitutions have occurred      Cher Avila PA-C

## 2019-01-28 RX ORDER — IPRATROPIUM BROMIDE AND ALBUTEROL SULFATE 2.5; .5 MG/3ML; MG/3ML
SOLUTION RESPIRATORY (INHALATION)
Qty: 360 ML | Refills: 12 | Status: SHIPPED | OUTPATIENT
Start: 2019-01-28 | End: 2019-09-17 | Stop reason: SDUPTHER

## 2019-04-14 DIAGNOSIS — I10 ESSENTIAL HYPERTENSION: ICD-10-CM

## 2019-04-15 RX ORDER — LISINOPRIL 20 MG/1
TABLET ORAL
Qty: 90 TABLET | Refills: 0 | Status: SHIPPED | OUTPATIENT
Start: 2019-04-15 | End: 2019-06-25 | Stop reason: SDUPTHER

## 2019-06-25 ENCOUNTER — APPOINTMENT (OUTPATIENT)
Dept: LAB | Facility: HOSPITAL | Age: 79
End: 2019-06-25

## 2019-06-25 ENCOUNTER — OFFICE VISIT (OUTPATIENT)
Dept: FAMILY MEDICINE CLINIC | Facility: CLINIC | Age: 79
End: 2019-06-25

## 2019-06-25 VITALS
BODY MASS INDEX: 24.39 KG/M2 | TEMPERATURE: 97.9 F | DIASTOLIC BLOOD PRESSURE: 60 MMHG | SYSTOLIC BLOOD PRESSURE: 130 MMHG | WEIGHT: 116.2 LBS | HEIGHT: 58 IN | HEART RATE: 99 BPM | OXYGEN SATURATION: 98 %

## 2019-06-25 DIAGNOSIS — H40.003 BORDERLINE GLAUCOMA, BILATERAL: ICD-10-CM

## 2019-06-25 DIAGNOSIS — R42 DIZZINESS: Primary | ICD-10-CM

## 2019-06-25 DIAGNOSIS — I10 ESSENTIAL HYPERTENSION: ICD-10-CM

## 2019-06-25 DIAGNOSIS — H40.059 RAISED INTRAOCULAR PRESSURE, UNSPECIFIED LATERALITY: ICD-10-CM

## 2019-06-25 PROCEDURE — 80061 LIPID PANEL: CPT | Performed by: FAMILY MEDICINE

## 2019-06-25 PROCEDURE — 80053 COMPREHEN METABOLIC PANEL: CPT | Performed by: FAMILY MEDICINE

## 2019-06-25 PROCEDURE — 99214 OFFICE O/P EST MOD 30 MIN: CPT | Performed by: FAMILY MEDICINE

## 2019-06-25 PROCEDURE — 85027 COMPLETE CBC AUTOMATED: CPT | Performed by: FAMILY MEDICINE

## 2019-06-25 PROCEDURE — 84443 ASSAY THYROID STIM HORMONE: CPT | Performed by: FAMILY MEDICINE

## 2019-06-25 RX ORDER — LISINOPRIL 20 MG/1
20 TABLET ORAL DAILY
Qty: 90 TABLET | Refills: 3 | Status: SHIPPED | OUTPATIENT
Start: 2019-06-25 | End: 2019-09-17 | Stop reason: SDUPTHER

## 2019-06-25 RX ORDER — ALBUTEROL SULFATE 90 UG/1
2 AEROSOL, METERED RESPIRATORY (INHALATION) EVERY 4 HOURS PRN
Qty: 1 INHALER | Refills: 11 | Status: SHIPPED | OUTPATIENT
Start: 2019-06-25 | End: 2019-09-17 | Stop reason: SDUPTHER

## 2019-06-25 RX ORDER — MECLIZINE HCL 12.5 MG/1
12.5 TABLET ORAL 3 TIMES DAILY PRN
Qty: 90 TABLET | Refills: 0 | Status: SHIPPED | OUTPATIENT
Start: 2019-06-25 | End: 2019-09-17 | Stop reason: ALTCHOICE

## 2019-06-25 RX ORDER — LORATADINE 10 MG/1
10 TABLET ORAL DAILY
Qty: 30 TABLET | Refills: 11 | Status: SHIPPED | OUTPATIENT
Start: 2019-06-25 | End: 2019-09-17 | Stop reason: SDUPTHER

## 2019-06-25 RX ORDER — LATANOPROST 50 UG/ML
1 SOLUTION/ DROPS OPHTHALMIC NIGHTLY
Qty: 1 EACH | Refills: 0 | Status: SHIPPED | OUTPATIENT
Start: 2019-06-25 | End: 2019-09-17

## 2019-06-25 RX ORDER — ALBUTEROL SULFATE 90 UG/1
2 AEROSOL, METERED RESPIRATORY (INHALATION) EVERY 6 HOURS PRN
Qty: 18 G | Refills: 11 | Status: SHIPPED | OUTPATIENT
Start: 2019-06-25 | End: 2019-09-17 | Stop reason: SDUPTHER

## 2019-06-25 NOTE — PROGRESS NOTES
" Subjective   Tamara Singh is a 79 y.o. female.     History of Present Illness     Dizziness over 1 year, when getting up will go to the side.  When bending head back will get room spinning.   Worse past 3 months.  She says there is a roaring sound in ear(s).  Denies one sided weakness, her hands may get tingling some times.   Wants all meds refilled.  Has glaucoma and cataract.  Wants me to refill xalatan.  She hasn't seen ophthmalogist since he retired.   She could not afford breo ellipta that dr rosado had given her.   Continued low back pain    Review of Systems   Constitutional: Negative for chills, fatigue and fever.   HENT: Negative for congestion, ear discharge, ear pain, facial swelling, hearing loss, postnasal drip, rhinorrhea, sinus pressure, sore throat, trouble swallowing and voice change.    Eyes: Negative for discharge, redness and visual disturbance.   Respiratory: Negative for cough, chest tightness, shortness of breath and wheezing.    Cardiovascular: Negative for chest pain and palpitations.   Gastrointestinal: Negative for abdominal pain, blood in stool, constipation, diarrhea, nausea and vomiting.   Endocrine: Negative for polydipsia and polyuria.   Genitourinary: Negative for dysuria, flank pain, hematuria and urgency.   Musculoskeletal: Positive for back pain. Negative for arthralgias, joint swelling and myalgias.   Skin: Negative for rash.   Neurological: Positive for dizziness. Negative for weakness, numbness and headaches.   Hematological: Negative for adenopathy.   Psychiatric/Behavioral: Negative for confusion and sleep disturbance. The patient is not nervous/anxious.            /60 (BP Location: Left arm, Patient Position: Sitting, Cuff Size: Adult)   Pulse 99   Temp 97.9 °F (36.6 °C) (Temporal)   Ht 147.3 cm (57.99\")   Wt 52.7 kg (116 lb 3.2 oz)   SpO2 98%   BMI 24.29 kg/m²       Objective     Physical Exam   Constitutional: She is oriented to person, place, and " time. She appears well-developed and well-nourished.   HENT:   Head: Normocephalic and atraumatic.   Right Ear: External ear normal.   Left Ear: External ear normal.   Nose: Nose normal.   Mouth/Throat: Oropharynx is clear and moist.   Eyes: Conjunctivae and EOM are normal. Pupils are equal, round, and reactive to light.   Neck: Normal range of motion. Neck supple.   Cardiovascular: Normal rate, regular rhythm and normal heart sounds. Exam reveals no gallop and no friction rub.   No murmur heard.  Pulmonary/Chest: Effort normal and breath sounds normal.   Abdominal: Soft. Bowel sounds are normal. She exhibits no distension. There is no tenderness. There is no rebound and no guarding.   Musculoskeletal: Normal range of motion. She exhibits no edema or deformity.   Neurological: She is alert and oriented to person, place, and time. No cranial nerve deficit.   Skin: Skin is warm and dry. No rash noted. No erythema.   Psychiatric: She has a normal mood and affect. Her behavior is normal. Judgment and thought content normal.   Nursing note and vitals reviewed.          PAST MEDICAL HISTORY     Past Medical History:   Diagnosis Date   • Acute bronchitis    • Artificial lens present     in position    • Backache    • Borderline glaucoma    • Borderline glaucoma    • Chronic obstructive lung disease (CMS/HCC)    • Chronic obstructive lung disease (CMS/HCC)    • Common cold    • Dysphagia    • Dyspnea    • Edema    • Encounter for immunization    • Epigastric pain    • Essential hypertension    • External hemorrhoids without complication    • Fever    • Heartburn    • Low back pain    • Malignant neoplasm (CMS/HCC)     Malignant neoplasm of other specified part of esophagus   • Malignant tumor of lower third of esophagus (CMS/HCC)    • Nuclear cataract    • Perleche    • Pneumathemia (CMS/HCC)     UNSPECIFIED ORGANISM   • Shoulder joint pain    • Upper respiratory infection    • Vitamin D deficiency    • Wheezing       PAST  SURGICAL HISTORY     Past Surgical History:   Procedure Laterality Date   • CATARACT EXTRACTION  2015    Remove cataract, insert lens (Left eye.)   • CHOLECYSTECTOMY  2004   • COLONOSCOPY  2013    Colon endoscopy 97211 (Internal & external hemorrhoids found.)   • ENDOSCOPY  2014    EGD w/ biopsy 51789 (Normal esophagus. Biopsy taken. Normal stomach. Normal duodenum.)   2014 GURPREET BHAT    • ENDOSCOPY  2014    EGD w/ tube 39436 (Esophagitis seen. Biopsy taken. No evidence of esophageal ca. Gastritis in stomach. Biopsy taken. Normal duodenum.)   • ENDOSCOPY  10/02/2013    EGD w/ tube 62696 (Normal esopahgus, stoamch, & duodenum. No evidence of tumor. Biopsy taken.)   • ENDOSCOPY  2013    EGD w/ tube 53767 (Tumor in distal 3rd of esophagus. Biopsy taken. Lesion runs from 28 cms to 34 cms. Normal stomach & duodenum.)   • INJECTION OF MEDICATION  2016    Kenalog (5)      • OTHER SURGICAL HISTORY  12/10/2014    Drain/Inject Major Joint  (Shoulder joint pain)    • OTHER SURGICAL HISTORY      OCT DISC NFL 21205 (Primary open angle glaucoma (2): 2016, 2015   • OTHER SURGICAL HISTORY  2016    OPTIC NERVE HEAD EVAL PERFORMED  (Primary open angle glaucoma)       SOCIAL HISTORY     Social History     Socioeconomic History   • Marital status:      Spouse name: Not on file   • Number of children: Not on file   • Years of education: Not on file   • Highest education level: Not on file   Tobacco Use   • Smoking status: Former Smoker     Types: Cigarettes     Last attempt to quit: 2014     Years since quittin.4   • Smokeless tobacco: Never Used   Substance and Sexual Activity   • Alcohol use: No   • Drug use: No   • Sexual activity: Defer      ALLERGIES   Patient has no known allergies.   MEDICATIONS     Current Outpatient Medications   Medication Sig Dispense Refill   • albuterol sulfate HFA (VENTOLIN HFA) 108 (90 Base) MCG/ACT inhaler Inhale 2  puffs Every 4 (Four) Hours As Needed for Shortness of Air. 1 inhaler 11   • albuterol sulfate HFA (VENTOLIN HFA) 108 (90 Base) MCG/ACT inhaler Inhale 2 puffs Every 6 (Six) Hours As Needed for Wheezing. 18 g 11   • Cholecalciferol (VITAMIN D3) 5000 units capsule capsule Take 5,000 Units by mouth Daily.     • furosemide (LASIX) 40 MG tablet TAKE ONE TABLET BY MOUTH ONCE DAILY AS NEEDED FOR  SWELLING/FLUID  (HOLD  IF  DIARRHEA  OR  VOMITNG) 30 tablet 11   • ipratropium-albuterol (COMBIVENT RESPIMAT)  MCG/ACT inhaler Inhale 1 puff 4 (Four) Times a Day. 4 g 11   • ipratropium-albuterol (COMBIVENT RESPIMAT)  MCG/ACT inhaler Inhale 1 puff 4 (Four) Times a Day. 12 g 3   • ipratropium-albuterol (DUO-NEB) 0.5-2.5 mg/3 ml nebulizer USE 1 AMPULE IN NEBULIZER 4 TIMES DAILY 360 mL 12   • lisinopril (PRINIVIL,ZESTRIL) 20 MG tablet Take 1 tablet by mouth Daily. 90 tablet 3   • loratadine (CLARITIN) 10 MG tablet Take 1 tablet by mouth Daily. 30 tablet 11   • BREO ELLIPTA 200-25 MCG/INH aerosol powder  INHALE 1 PUFF BY MOUTH ONCE DAILY 1 each 5   • Cholecalciferol 21264 UNITS capsule Take 1 capsule by mouth daily.     • COMBIVENT RESPIMAT  MCG/ACT inhaler INHALE ONE PUFF BY MOUTH 4 TIMES DAILY 4 g 11   • latanoprost (XALATAN) 0.005 % ophthalmic solution Administer 1 drop to both eyes Every Night. 1 each 0   • meclizine (ANTIVERT) 12.5 MG tablet Take 1 tablet by mouth 3 (Three) Times a Day As Needed for dizziness. 90 tablet 0     No current facility-administered medications for this visit.         The following portions of the patient's history were reviewed and updated as appropriate: allergies, current medications, past family history, past medical history, past social history, past surgical history and problem list.        Assessment/Plan   Tamara was seen today for dizziness and back pain.    Diagnoses and all orders for this visit:    Dizziness  -     Ambulatory Referral to ENT (Otolaryngology)    Essential  hypertension  -     CBC (No Diff)  -     Comprehensive Metabolic Panel  -     TSH  -     Lipid Panel  -     lisinopril (PRINIVIL,ZESTRIL) 20 MG tablet; Take 1 tablet by mouth Daily.    Borderline glaucoma, bilateral  Comments:  IOP down with latanoprost  Orders:  -     latanoprost (XALATAN) 0.005 % ophthalmic solution; Administer 1 drop to both eyes Every Night.    Raised intraocular pressure, unspecified laterality  -     Ambulatory Referral to Ophthalmology    Other orders  -     meclizine (ANTIVERT) 12.5 MG tablet; Take 1 tablet by mouth 3 (Three) Times a Day As Needed for dizziness.  -     albuterol sulfate HFA (VENTOLIN HFA) 108 (90 Base) MCG/ACT inhaler; Inhale 2 puffs Every 4 (Four) Hours As Needed for Shortness of Air.  -     albuterol sulfate HFA (VENTOLIN HFA) 108 (90 Base) MCG/ACT inhaler; Inhale 2 puffs Every 6 (Six) Hours As Needed for Wheezing.  -     loratadine (CLARITIN) 10 MG tablet; Take 1 tablet by mouth Daily.  -     ipratropium-albuterol (COMBIVENT RESPIMAT)  MCG/ACT inhaler; Inhale 1 puff 4 (Four) Times a Day.      Refilled xalatan once, will need to have eyes checked, referral given  meds refilled.    Warned meclizine may cause drowsiness.    orthostasis as well.  Get up slowly.                  No Follow-up on file.                  This document has been electronically signed by Jean Pierre Scanlon MD on June 25, 2019 12:53 PM

## 2019-06-26 LAB
ALBUMIN SERPL-MCNC: 4.5 G/DL (ref 3.5–5.2)
ALBUMIN/GLOB SERPL: 1.5 G/DL
ALP SERPL-CCNC: 122 U/L (ref 39–117)
ALT SERPL W P-5'-P-CCNC: 18 U/L (ref 1–33)
ANION GAP SERPL CALCULATED.3IONS-SCNC: 15 MMOL/L (ref 5–15)
AST SERPL-CCNC: 29 U/L (ref 1–32)
BILIRUB SERPL-MCNC: 0.3 MG/DL (ref 0.2–1.2)
BUN BLD-MCNC: 56 MG/DL (ref 8–23)
BUN/CREAT SERPL: 34.1 (ref 7–25)
CALCIUM SPEC-SCNC: 10 MG/DL (ref 8.6–10.5)
CHLORIDE SERPL-SCNC: 99 MMOL/L (ref 98–107)
CHOLEST SERPL-MCNC: 212 MG/DL (ref 0–200)
CO2 SERPL-SCNC: 24 MMOL/L (ref 22–29)
CREAT BLD-MCNC: 1.64 MG/DL (ref 0.57–1)
DEPRECATED RDW RBC AUTO: 44.6 FL (ref 37–54)
ERYTHROCYTE [DISTWIDTH] IN BLOOD BY AUTOMATED COUNT: 12.8 % (ref 12.3–15.4)
GFR SERPL CREATININE-BSD FRML MDRD: 30 ML/MIN/1.73
GLOBULIN UR ELPH-MCNC: 3.1 GM/DL
GLUCOSE BLD-MCNC: 74 MG/DL (ref 65–99)
HCT VFR BLD AUTO: 34.4 % (ref 34–46.6)
HDLC SERPL-MCNC: 81 MG/DL (ref 40–60)
HGB BLD-MCNC: 11.4 G/DL (ref 12–15.9)
LDLC SERPL CALC-MCNC: 110 MG/DL (ref 0–100)
LDLC/HDLC SERPL: 1.35 {RATIO}
MCH RBC QN AUTO: 31.6 PG (ref 26.6–33)
MCHC RBC AUTO-ENTMCNC: 33.1 G/DL (ref 31.5–35.7)
MCV RBC AUTO: 95.3 FL (ref 79–97)
PLATELET # BLD AUTO: 286 10*3/MM3 (ref 140–450)
PMV BLD AUTO: 10.4 FL (ref 6–12)
POTASSIUM BLD-SCNC: 5.4 MMOL/L (ref 3.5–5.2)
PROT SERPL-MCNC: 7.6 G/DL (ref 6–8.5)
RBC # BLD AUTO: 3.61 10*6/MM3 (ref 3.77–5.28)
SODIUM BLD-SCNC: 138 MMOL/L (ref 136–145)
TRIGL SERPL-MCNC: 107 MG/DL (ref 0–150)
TSH SERPL DL<=0.05 MIU/L-ACNC: 3.79 MIU/ML (ref 0.27–4.2)
VLDLC SERPL-MCNC: 21.4 MG/DL (ref 5–40)
WBC NRBC COR # BLD: 4.71 10*3/MM3 (ref 3.4–10.8)

## 2019-07-25 ENCOUNTER — OFFICE VISIT (OUTPATIENT)
Dept: OTOLARYNGOLOGY | Facility: CLINIC | Age: 79
End: 2019-07-25

## 2019-07-25 VITALS
WEIGHT: 117.2 LBS | BODY MASS INDEX: 23.63 KG/M2 | SYSTOLIC BLOOD PRESSURE: 102 MMHG | DIASTOLIC BLOOD PRESSURE: 70 MMHG | HEIGHT: 59 IN

## 2019-07-25 DIAGNOSIS — R42 DIZZINESS: Primary | ICD-10-CM

## 2019-07-25 PROCEDURE — 99203 OFFICE O/P NEW LOW 30 MIN: CPT | Performed by: OTOLARYNGOLOGY

## 2019-07-29 NOTE — PROGRESS NOTES
"Subjective   Tamara Singh is a 79 y.o. female.     History of Present Illness     Patient is here for evaluation of dizziness.  Symptoms have been present for over a year and happen at least 3-4 times a day.  Typically occurs when she turns her head or extends her neck.  She says during the episode she will sometimes feel like she is going to pass out.  Says \"my eyesight leaves me\".  Sometimes feels like she is spinning.  No change in hearing.  No otorrhea.  Nothing in particular brought this on.  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  Has been given meclizine which just makes her sleepy.  Has a history of esophageal cancer.    The following portions of the patient's history were reviewed and updated as appropriate: allergies, current medications, past family history, past medical history, past social history, past surgical history and problem list.      Tamara Singh reports that she quit smoking about 5 years ago. Her smoking use included cigarettes. She has never used smokeless tobacco. She reports that she does not drink alcohol or use drugs.  Patient is not a tobacco user and has not been counseled for use of tobacco products    Family History   Problem Relation Age of Onset   • Diabetes Mother    • Cancer Father    • Cancer Brother    • Diabetes Brother        No Known Allergies      Current Outpatient Medications:   •  albuterol sulfate HFA (VENTOLIN HFA) 108 (90 Base) MCG/ACT inhaler, Inhale 2 puffs Every 4 (Four) Hours As Needed for Shortness of Air., Disp: 1 inhaler, Rfl: 11  •  albuterol sulfate HFA (VENTOLIN HFA) 108 (90 Base) MCG/ACT inhaler, Inhale 2 puffs Every 6 (Six) Hours As Needed for Wheezing., Disp: 18 g, Rfl: 11  •  Cholecalciferol (VITAMIN D3) 5000 units capsule capsule, Take 5,000 Units by mouth Daily., Disp: , Rfl:   •  Cholecalciferol 85878 UNITS capsule, Take 1 capsule by mouth daily., Disp: , Rfl:   •  COMBIVENT RESPIMAT  MCG/ACT " inhaler, INHALE ONE PUFF BY MOUTH 4 TIMES DAILY, Disp: 4 g, Rfl: 11  •  furosemide (LASIX) 40 MG tablet, TAKE ONE TABLET BY MOUTH ONCE DAILY AS NEEDED FOR  SWELLING/FLUID  (HOLD  IF  DIARRHEA  OR  VOMITNG), Disp: 30 tablet, Rfl: 11  •  ipratropium-albuterol (COMBIVENT RESPIMAT)  MCG/ACT inhaler, Inhale 1 puff 4 (Four) Times a Day., Disp: 4 g, Rfl: 11  •  ipratropium-albuterol (COMBIVENT RESPIMAT)  MCG/ACT inhaler, Inhale 1 puff 4 (Four) Times a Day., Disp: 12 g, Rfl: 3  •  ipratropium-albuterol (DUO-NEB) 0.5-2.5 mg/3 ml nebulizer, USE 1 AMPULE IN NEBULIZER 4 TIMES DAILY, Disp: 360 mL, Rfl: 12  •  latanoprost (XALATAN) 0.005 % ophthalmic solution, Administer 1 drop to both eyes Every Night., Disp: 1 each, Rfl: 0  •  lisinopril (PRINIVIL,ZESTRIL) 20 MG tablet, Take 1 tablet by mouth Daily., Disp: 90 tablet, Rfl: 3  •  loratadine (CLARITIN) 10 MG tablet, Take 1 tablet by mouth Daily., Disp: 30 tablet, Rfl: 11  •  meclizine (ANTIVERT) 12.5 MG tablet, Take 1 tablet by mouth 3 (Three) Times a Day As Needed for dizziness., Disp: 90 tablet, Rfl: 0    Past Medical History:   Diagnosis Date   • Acute bronchitis    • Artificial lens present     in position    • Backache    • Borderline glaucoma    • Borderline glaucoma    • Chronic obstructive lung disease (CMS/HCC)    • Chronic obstructive lung disease (CMS/HCC)    • Common cold    • Dysphagia    • Dyspnea    • Edema    • Encounter for immunization    • Epigastric pain    • Essential hypertension    • External hemorrhoids without complication    • Fever    • Heartburn    • Low back pain    • Malignant neoplasm (CMS/HCC)     Malignant neoplasm of other specified part of esophagus   • Malignant tumor of lower third of esophagus (CMS/HCC)    • Nuclear cataract    • Perleche    • Pneumathemia (CMS/HCC)     UNSPECIFIED ORGANISM   • Shoulder joint pain    • Upper respiratory infection    • Vitamin D deficiency    • Wheezing        Past Surgical History:   Procedure  Laterality Date   • CATARACT EXTRACTION  08/06/2015    Remove cataract, insert lens (Left eye.)   • CHOLECYSTECTOMY  07/14/2004   • COLONOSCOPY  05/23/2013    Colon endoscopy 24801 (Internal & external hemorrhoids found.)   • ENDOSCOPY  11/26/2014    EGD w/ biopsy 21617 (Normal esophagus. Biopsy taken. Normal stomach. Normal duodenum.)   11/26/2014 GURPREET BHAT    • ENDOSCOPY  03/19/2014    EGD w/ tube 49751 (Esophagitis seen. Biopsy taken. No evidence of esophageal ca. Gastritis in stomach. Biopsy taken. Normal duodenum.)   • ENDOSCOPY  10/02/2013    EGD w/ tube 50219 (Normal esopahgus, stoamch, & duodenum. No evidence of tumor. Biopsy taken.)   • ENDOSCOPY  05/23/2013    EGD w/ tube 80198 (Tumor in distal 3rd of esophagus. Biopsy taken. Lesion runs from 28 cms to 34 cms. Normal stomach & duodenum.)   • INJECTION OF MEDICATION  01/25/2016    Kenalog (5)      • OTHER SURGICAL HISTORY  12/10/2014    Drain/Inject Major Joint 20610 (Shoulder joint pain)    • OTHER SURGICAL HISTORY      OCT DISC NFL 91457 (Primary open angle glaucoma (2): 7/26/2016, 6/16/2015   • OTHER SURGICAL HISTORY  07/26/2016    OPTIC NERVE HEAD EVAL PERFORMED 2027F (Primary open angle glaucoma)          Review of Systems   Respiratory: Positive for cough, shortness of breath and wheezing.    Musculoskeletal: Positive for arthralgias and back pain.   Neurological: Positive for dizziness and headaches.   Hematological: Bruises/bleeds easily.   All other systems reviewed and are negative.          Objective   Physical Exam  General: Well-developed well-nourished female in no acute distress.  Alert and oriented x-3. Head: Normocephalic. Face: Symmetrical strength and appearance. PERRL. EOMI. Voice:Strong. Speech:Fluent  Ears: External ears no deformity, canals no discharge, tympanic membranes intact clear and mobile bilaterally.  Nose: Nares show no discharge mass polyp or purulence.  Boggy mucosa is present.  No gross external deformity.  Septum:  Midline  Oral cavity: Lips and gums without lesions.  Tongue and floor of mouth without lesions.  Parotid and submandibular ducts unobstructed.  No mucosal lesions on the buccal mucosa or vestibule of the mouth.  Pharynx: No erythema exudate mass or ulcer  Neck: No lymphadenopathy.  No thyromegaly.  Trachea and larynx midline.  No masses in the parotid or submandibular glands.  Melba-Hallpike maneuvers produce a sensation of near syncope with her head to the left, no rotatory nystagmus or subjective spinning.  She also had similar symptoms while sitting upright but with her neck extended and rotated to the left.        Assessment/Plan   Tamara was seen today for dizziness.    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  I suspect that no specific surgical intervention would be of benefit.  Told her to stop taking her meclizine.  Long discussion with the patient and her family member regarding precautions to help prevent falls at home.  Follow-up with me as needed.

## 2019-09-17 ENCOUNTER — OFFICE VISIT (OUTPATIENT)
Dept: FAMILY MEDICINE CLINIC | Facility: CLINIC | Age: 79
End: 2019-09-17

## 2019-09-17 VITALS
DIASTOLIC BLOOD PRESSURE: 54 MMHG | TEMPERATURE: 97.1 F | HEIGHT: 59 IN | HEART RATE: 103 BPM | OXYGEN SATURATION: 97 % | SYSTOLIC BLOOD PRESSURE: 120 MMHG | WEIGHT: 115.2 LBS | BODY MASS INDEX: 23.22 KG/M2

## 2019-09-17 DIAGNOSIS — J44.1 ACUTE EXACERBATION OF CHRONIC OBSTRUCTIVE PULMONARY DISEASE (COPD) (HCC): ICD-10-CM

## 2019-09-17 DIAGNOSIS — Z00.00 MEDICARE ANNUAL WELLNESS VISIT, INITIAL: Primary | ICD-10-CM

## 2019-09-17 DIAGNOSIS — I10 ESSENTIAL HYPERTENSION: ICD-10-CM

## 2019-09-17 PROCEDURE — 96372 THER/PROPH/DIAG INJ SC/IM: CPT | Performed by: FAMILY MEDICINE

## 2019-09-17 PROCEDURE — G0438 PPPS, INITIAL VISIT: HCPCS | Performed by: FAMILY MEDICINE

## 2019-09-17 RX ORDER — ALBUTEROL SULFATE 90 UG/1
2 AEROSOL, METERED RESPIRATORY (INHALATION) EVERY 6 HOURS PRN
Qty: 18 G | Refills: 11 | Status: SHIPPED | OUTPATIENT
Start: 2019-09-17 | End: 2020-08-18 | Stop reason: SDUPTHER

## 2019-09-17 RX ORDER — FUROSEMIDE 40 MG/1
40 TABLET ORAL DAILY PRN
Qty: 30 TABLET | Refills: 11 | Status: SHIPPED | OUTPATIENT
Start: 2019-09-17 | End: 2020-08-18 | Stop reason: SDUPTHER

## 2019-09-17 RX ORDER — TRIAMCINOLONE ACETONIDE 40 MG/ML
80 INJECTION, SUSPENSION INTRA-ARTICULAR; INTRAMUSCULAR ONCE
Status: COMPLETED | OUTPATIENT
Start: 2019-09-17 | End: 2019-09-17

## 2019-09-17 RX ORDER — LORATADINE 10 MG/1
10 TABLET ORAL DAILY
Qty: 30 TABLET | Refills: 11 | Status: SHIPPED | OUTPATIENT
Start: 2019-09-17 | End: 2020-08-18 | Stop reason: SDUPTHER

## 2019-09-17 RX ORDER — LISINOPRIL 20 MG/1
20 TABLET ORAL DAILY
Qty: 90 TABLET | Refills: 3 | Status: SHIPPED | OUTPATIENT
Start: 2019-09-17 | End: 2020-08-18 | Stop reason: SDUPTHER

## 2019-09-17 RX ORDER — IPRATROPIUM BROMIDE AND ALBUTEROL SULFATE 2.5; .5 MG/3ML; MG/3ML
3 SOLUTION RESPIRATORY (INHALATION)
Qty: 360 ML | Refills: 12 | Status: ON HOLD | OUTPATIENT
Start: 2019-09-17 | End: 2022-01-01 | Stop reason: SDUPTHER

## 2019-09-17 RX ADMIN — TRIAMCINOLONE ACETONIDE 80 MG: 40 INJECTION, SUSPENSION INTRA-ARTICULAR; INTRAMUSCULAR at 14:07

## 2019-09-17 NOTE — PROGRESS NOTES
The ABCs of the Annual Wellness Visit  Initial Medicare Wellness Visit    Chief Complaint   Patient presents with   • Medicare Wellness-Initial Visit       Subjective   History of Present Illness:  Tamara Singh is a 79 y.o. female who presents for an Initial Medicare Wellness Visit.  She also wants all medicines refilled.   No smoking 3 years.   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     HEALTH RISK ASSESSMENT    Recent Hospitalizations:  No hospitalization(s) within the last year.    Current Medical Providers:  Patient Care Team:  Jean Pierre Scanlon MD as PCP - General  Jean Pierre Scanlon MD as PCP - Claims Attributed  Jaun Mathew MD as Consulting Physician (Hematology and Oncology)  Iva Aj APRN as Nurse Practitioner (Oncology)    Smoking Status:  Social History     Tobacco Use   Smoking Status Former Smoker   • Types: Cigarettes   • Last attempt to quit: 2014   • Years since quittin.6   Smokeless Tobacco Never Used       Alcohol Consumption:  Social History     Substance and Sexual Activity   Alcohol Use No       Depression Screen:   PHQ-2/PHQ-9 Depression Screening 2019   Little interest or pleasure in doing things 0   Feeling down, depressed, or hopeless 0   Total Score 0       Fall Risk Screen:  STEADI Fall Risk Assessment has not been completed.    Health Habits and Functional and Cognitive Screening:  Functional & Cognitive Status 2019   Do you have difficulty preparing food and eating? No   Do you have difficulty bathing yourself, getting dressed or grooming yourself? No   Do you have difficulty using the toilet? No   Do you have difficulty moving around from place to place? No   Do you have trouble with steps or getting out of a bed or a chair? No   Current Diet Well Balanced Diet   Dental Exam Not up to date   Eye Exam Up to date   Exercise (times per week) 0 times per week   Current Exercise  Activities Include None   Do you need help using the phone?  No   Are you deaf or do you have serious difficulty hearing?  Yes   Do you need help with transportation? No   Do you need help shopping? Yes   Do you need help preparing meals?  Yes   Do you need help with housework?  No   Do you need help with laundry? Yes   Do you need help taking your medications? No   Do you need help managing money? No   Do you ever drive or ride in a car without wearing a seat belt? No   Have you felt unusual stress, anger or loneliness in the last month? Yes   Who do you live with? Alone   If you need help, do you have trouble finding someone available to you? No   Have you been bothered in the last four weeks by sexual problems? No   Do you have difficulty concentrating, remembering or making decisions? No         Does the patient have evidence of cognitive impairment? No    Asprin use counseling:Does not need ASA (and currently is not on it)    Age-appropriate Screening Schedule:  Refer to the list below for future screening recommendations based on patient's age, sex and/or medical conditions. Orders for these recommended tests are listed in the plan section. The patient has been provided with a written plan.    Health Maintenance   Topic Date Due   • TDAP/TD VACCINES (1 - Tdap) 09/17/2019 (Originally 5/25/1959)   • PNEUMOCOCCAL VACCINES (65+ LOW/MEDIUM RISK) (2 of 2 - PPSV23) 10/05/2019 (Originally 3/22/2017)   • INFLUENZA VACCINE  10/12/2019 (Originally 8/1/2019)   • ZOSTER VACCINE (2 of 2) 09/25/2020 (Originally 5/28/2018)   • MAMMOGRAM  04/02/2020   • COLONOSCOPY  05/23/2023          The following portions of the patient's history were reviewed and updated as appropriate: allergies, current medications, past family history, past medical history, past social history, past surgical history and problem list.    Outpatient Medications Prior to Visit   Medication Sig Dispense Refill   • albuterol sulfate HFA (VENTOLIN HFA) 108  (90 Base) MCG/ACT inhaler Inhale 2 puffs Every 6 (Six) Hours As Needed for Wheezing. 18 g 11   • Cholecalciferol 45446 UNITS capsule Take 1 capsule by mouth daily.     • furosemide (LASIX) 40 MG tablet TAKE ONE TABLET BY MOUTH ONCE DAILY AS NEEDED FOR  SWELLING/FLUID  (HOLD  IF  DIARRHEA  OR  VOMITNG) 30 tablet 11   • ipratropium-albuterol (COMBIVENT RESPIMAT)  MCG/ACT inhaler Inhale 1 puff 4 (Four) Times a Day. 12 g 3   • ipratropium-albuterol (DUO-NEB) 0.5-2.5 mg/3 ml nebulizer USE 1 AMPULE IN NEBULIZER 4 TIMES DAILY 360 mL 12   • lisinopril (PRINIVIL,ZESTRIL) 20 MG tablet Take 1 tablet by mouth Daily. 90 tablet 3   • loratadine (CLARITIN) 10 MG tablet Take 1 tablet by mouth Daily. 30 tablet 11   • albuterol sulfate HFA (VENTOLIN HFA) 108 (90 Base) MCG/ACT inhaler Inhale 2 puffs Every 4 (Four) Hours As Needed for Shortness of Air. 1 inhaler 11   • ipratropium-albuterol (COMBIVENT RESPIMAT)  MCG/ACT inhaler Inhale 1 puff 4 (Four) Times a Day. 4 g 11   • Cholecalciferol (VITAMIN D3) 5000 units capsule capsule Take 5,000 Units by mouth Daily.     • COMBIVENT RESPIMAT  MCG/ACT inhaler INHALE ONE PUFF BY MOUTH 4 TIMES DAILY 4 g 11   • latanoprost (XALATAN) 0.005 % ophthalmic solution Administer 1 drop to both eyes Every Night. 1 each 0   • meclizine (ANTIVERT) 12.5 MG tablet Take 1 tablet by mouth 3 (Three) Times a Day As Needed for dizziness. 90 tablet 0     No facility-administered medications prior to visit.        Patient Active Problem List   Diagnosis   • Nuclear sclerosis   • Artificial lens present   • Borderline glaucoma   • Corneal endothelial dystrophy   • Pseudophakia   • Cortical age-related cataract   • Nuclear cataract   • History of esophageal cancer   • Acute kidney injury (CMS/HCC)       Advanced Care Planning:  Patient does not have an advance directive - information provided to the patient today    Review of Systems    Compared to one year ago, the patient feels her physical health  "is the same.  Compared to one year ago, the patient feels her mental health is the same.    Reviewed chart for potential of high risk medication in the elderly: yes  Reviewed chart for potential of harmful drug interactions in the elderly:yes    Objective         Vitals:    09/17/19 1303   BP: 120/54   BP Location: Left arm   Patient Position: Sitting   Cuff Size: Adult   Pulse: 103   Temp: 97.1 °F (36.2 °C)   TempSrc: Temporal   SpO2: 97%   Weight: 52.3 kg (115 lb 3.2 oz)   Height: 149.9 cm (59.02\")   PainSc:   7   PainLoc: Back       Body mass index is 23.25 kg/m².  Discussed the patient's BMI with her. The BMI is in the acceptable range.    Physical Exam    Lab Results   Component Value Date    TRIG 107 06/25/2019    HDL 81 (H) 06/25/2019     (H) 06/25/2019    VLDL 21.4 06/25/2019        Assessment/Plan   Medicare Risks and Personalized Health Plan  CMS Preventative Services Quick Reference  Advance Directive Discussion  Breast Cancer/Mammogram Screening  Depression/Dysphoria  Polypharmacy    The above risks/problems have been discussed with the patient.  Pertinent information has been shared with the patient in the After Visit Summary.  Follow up plans and orders are seen below in the Assessment/Plan Section.    Diagnoses and all orders for this visit:    1. Medicare annual wellness visit, initial (Primary)    2. Essential hypertension  -     lisinopril (PRINIVIL,ZESTRIL) 20 MG tablet; Take 1 tablet by mouth Daily.  Dispense: 90 tablet; Refill: 3    3. Acute exacerbation of chronic obstructive pulmonary disease (COPD) (CMS/[facility])  -     triamcinolone acetonide (KENALOG-40) injection 80 mg    Other orders  -     loratadine (CLARITIN) 10 MG tablet; Take 1 tablet by mouth Daily.  Dispense: 30 tablet; Refill: 11  -     ipratropium-albuterol (DUO-NEB) 0.5-2.5 mg/3 ml nebulizer; Take 3 mL by nebulization 4 (Four) Times a Day.  Dispense: 360 mL; Refill: 12  -     ipratropium-albuterol (COMBIVENT RESPIMAT)  " MCG/ACT inhaler; Inhale 1 puff 4 (Four) Times a Day.  Dispense: 12 g; Refill: 11  -     furosemide (LASIX) 40 MG tablet; Take 1 tablet by mouth Daily As Needed (SWELLING).  Dispense: 30 tablet; Refill: 11  -     albuterol sulfate HFA (VENTOLIN HFA) 108 (90 Base) MCG/ACT inhaler; Inhale 2 puffs Every 6 (Six) Hours As Needed for Wheezing.  Dispense: 18 g; Refill: 11      Follow Up:  No Follow-up on file.     An After Visit Summary and PPPS were given to the patient.  SHE DOESN'T WANT MAMMOGRAM.  END OF LIFE PAPERWORK FILLED OUT AT LAST HOSPITALIZATION [facility] PER PATIENT  IN Transfer.

## 2020-02-06 ENCOUNTER — OFFICE VISIT (OUTPATIENT)
Dept: FAMILY MEDICINE CLINIC | Facility: CLINIC | Age: 80
End: 2020-02-06

## 2020-02-06 VITALS
BODY MASS INDEX: 21.65 KG/M2 | OXYGEN SATURATION: 94 % | DIASTOLIC BLOOD PRESSURE: 60 MMHG | HEART RATE: 100 BPM | TEMPERATURE: 97.9 F | HEIGHT: 59 IN | WEIGHT: 107.4 LBS | SYSTOLIC BLOOD PRESSURE: 140 MMHG

## 2020-02-06 DIAGNOSIS — F41.9 ANXIETY: ICD-10-CM

## 2020-02-06 DIAGNOSIS — J20.9 ACUTE BRONCHITIS, UNSPECIFIED ORGANISM: Primary | ICD-10-CM

## 2020-02-06 PROCEDURE — 99213 OFFICE O/P EST LOW 20 MIN: CPT | Performed by: FAMILY MEDICINE

## 2020-02-06 PROCEDURE — 96372 THER/PROPH/DIAG INJ SC/IM: CPT | Performed by: FAMILY MEDICINE

## 2020-02-06 RX ORDER — TRIAMCINOLONE ACETONIDE 40 MG/ML
40 INJECTION, SUSPENSION INTRA-ARTICULAR; INTRAMUSCULAR ONCE
Status: COMPLETED | OUTPATIENT
Start: 2020-02-06 | End: 2020-02-06

## 2020-02-06 RX ORDER — CLONAZEPAM 0.5 MG/1
.25-.5 TABLET ORAL 2 TIMES DAILY PRN
Qty: 30 TABLET | Refills: 0 | Status: SHIPPED | OUTPATIENT
Start: 2020-02-06 | End: 2020-10-07

## 2020-02-06 RX ORDER — SERTRALINE HYDROCHLORIDE 25 MG/1
25 TABLET, FILM COATED ORAL DAILY
Qty: 30 TABLET | Refills: 1 | Status: SHIPPED | OUTPATIENT
Start: 2020-02-06 | End: 2020-03-30

## 2020-02-06 RX ORDER — LATANOPROST 50 UG/ML
SOLUTION/ DROPS OPHTHALMIC
COMMUNITY
Start: 2020-01-30

## 2020-02-06 RX ADMIN — TRIAMCINOLONE ACETONIDE 40 MG: 40 INJECTION, SUSPENSION INTRA-ARTICULAR; INTRAMUSCULAR at 12:37

## 2020-02-06 NOTE — PROGRESS NOTES
" Subjective   Tamara Singh is a 79 y.o. female.     History of Present Illness     Cold/cough and feeling bad.  Anxious and depressed.  Son catches her crying at times.  She has grandson [place] and she worries about him.  She has been through chemo and radiation for cancer.  She wears home oxygen.   She says she is tired, just tired and ready to go home.   She was given steroids and it made her anxious and tense.  She wont take steroids any more.  She was given iv and then po  She doesn't sleep well.  Off and on all night awake.     Review of Systems   Constitutional: Negative for chills, fatigue and fever.   HENT: Negative for congestion, ear discharge, ear pain, facial swelling, hearing loss, postnasal drip, rhinorrhea, sinus pressure, sore throat, trouble swallowing and voice change.    Eyes: Negative for discharge, redness and visual disturbance.   Respiratory: Positive for shortness of breath. Negative for cough, chest tightness and wheezing.    Cardiovascular: Negative for chest pain and palpitations.   Gastrointestinal: Negative for abdominal pain, blood in stool, constipation, diarrhea, nausea and vomiting.   Endocrine: Negative for polydipsia and polyuria.   Genitourinary: Negative for dysuria, flank pain, hematuria and urgency.   Musculoskeletal: Negative for arthralgias, back pain, joint swelling and myalgias.   Skin: Negative for rash.   Neurological: Negative for dizziness, weakness, numbness and headaches.   Hematological: Negative for adenopathy.   Psychiatric/Behavioral: Positive for dysphoric mood and sleep disturbance. Negative for confusion. The patient is nervous/anxious.            /60 (BP Location: Left arm, Patient Position: Sitting, Cuff Size: Adult)   Pulse 100   Temp 97.9 °F (36.6 °C) (Temporal)   Ht 149.9 cm (59.02\")   Wt 48.7 kg (107 lb 6.4 oz)   SpO2 94%   BMI 21.68 kg/m²       Objective     Physical Exam   Constitutional: She is oriented to person, place, and " time. She appears well-developed and well-nourished.   HENT:   Head: Normocephalic and atraumatic.   Right Ear: External ear normal.   Left Ear: External ear normal.   Nose: Nose normal.   Eyes: Pupils are equal, round, and reactive to light. Conjunctivae and EOM are normal.   Neck: Normal range of motion.   Pulmonary/Chest: Effort normal.   Very diminished lungs.    Musculoskeletal: Normal range of motion.   Neurological: She is alert and oriented to person, place, and time.   Psychiatric: She has a normal mood and affect. Her behavior is normal. Judgment and thought content normal.   Anxious, tense holding her hands tightly   Nursing note and vitals reviewed.          PAST MEDICAL HISTORY     Past Medical History:   Diagnosis Date   • Acute bronchitis    • Artificial lens present     in position    • Backache    • Borderline glaucoma    • Borderline glaucoma    • Chronic obstructive lung disease (CMS/HCC)    • Chronic obstructive lung disease (CMS/HCC)    • Common cold    • Dysphagia    • Dyspnea    • Edema    • Encounter for immunization    • Epigastric pain    • Essential hypertension    • External hemorrhoids without complication    • Fever    • Heartburn    • Low back pain    • Malignant neoplasm (CMS/HCC)     Malignant neoplasm of other specified part of esophagus   • Malignant tumor of lower third of esophagus (CMS/HCC)    • Nuclear cataract    • Perleche    • Pneumathemia (CMS/HCC)     UNSPECIFIED ORGANISM   • Shoulder joint pain    • Upper respiratory infection    • Vitamin D deficiency    • Wheezing       PAST SURGICAL HISTORY     Past Surgical History:   Procedure Laterality Date   • CATARACT EXTRACTION  08/06/2015    Remove cataract, insert lens (Left eye.)   • CHOLECYSTECTOMY  07/14/2004   • COLONOSCOPY  05/23/2013    Colon endoscopy 17561 (Internal & external hemorrhoids found.)   • ENDOSCOPY  11/26/2014    EGD w/ biopsy 37754 (Normal esophagus. Biopsy taken. Normal stomach. Normal duodenum.)    2014 GURPREET BHAT    • ENDOSCOPY  2014    EGD w/ tube 27140 (Esophagitis seen. Biopsy taken. No evidence of esophageal ca. Gastritis in stomach. Biopsy taken. Normal duodenum.)   • ENDOSCOPY  10/02/2013    EGD w/ tube 35842 (Normal esopahgus, stoamch, & duodenum. No evidence of tumor. Biopsy taken.)   • ENDOSCOPY  2013    EGD w/ tube 77414 (Tumor in distal 3rd of esophagus. Biopsy taken. Lesion runs from 28 cms to 34 cms. Normal stomach & duodenum.)   • INJECTION OF MEDICATION  2016    Kenalog (5)      • OTHER SURGICAL HISTORY  12/10/2014    Drain/Inject Major Joint  (Shoulder joint pain)    • OTHER SURGICAL HISTORY      OCT DISC NFL 73761 (Primary open angle glaucoma (2): 2016, 2015   • OTHER SURGICAL HISTORY  2016    OPTIC NERVE HEAD EVAL PERFORMED  (Primary open angle glaucoma)       SOCIAL HISTORY     Social History     Socioeconomic History   • Marital status:      Spouse name: Not on file   • Number of children: Not on file   • Years of education: Not on file   • Highest education level: Not on file   Tobacco Use   • Smoking status: Former Smoker     Types: Cigarettes     Last attempt to quit: 2014     Years since quittin.0   • Smokeless tobacco: Never Used   Substance and Sexual Activity   • Alcohol use: No   • Drug use: No   • Sexual activity: Defer      ALLERGIES   Patient has no known allergies.   MEDICATIONS     Current Outpatient Medications   Medication Sig Dispense Refill   • albuterol sulfate HFA (VENTOLIN HFA) 108 (90 Base) MCG/ACT inhaler Inhale 2 puffs Every 6 (Six) Hours As Needed for Wheezing. 18 g 11   • Cholecalciferol 64219 UNITS capsule Take 1 capsule by mouth daily.     • furosemide (LASIX) 40 MG tablet Take 1 tablet by mouth Daily As Needed (SWELLING). 30 tablet 11   • ipratropium-albuterol (COMBIVENT RESPIMAT)  MCG/ACT inhaler Inhale 1 puff 4 (Four) Times a Day. 12 g 11   • ipratropium-albuterol (DUO-NEB) 0.5-2.5 mg/3 ml  nebulizer Take 3 mL by nebulization 4 (Four) Times a Day. 360 mL 12   • latanoprost (XALATAN) 0.005 % ophthalmic solution INSTILL 1 DROP INTO EACH EYE AT BEDTIME     • lisinopril (PRINIVIL,ZESTRIL) 20 MG tablet Take 1 tablet by mouth Daily. 90 tablet 3   • loratadine (CLARITIN) 10 MG tablet Take 1 tablet by mouth Daily. 30 tablet 11   • clonazePAM (KLONOPIN) 0.5 MG tablet Take 0.5-1 tablets by mouth 2 (Two) Times a Day As Needed for Anxiety. 30 tablet 0   • sertraline (ZOLOFT) 25 MG tablet Take 1 tablet by mouth Daily. 30 tablet 1     Current Facility-Administered Medications   Medication Dose Route Frequency Provider Last Rate Last Dose   • triamcinolone acetonide (KENALOG-40) injection 40 mg  40 mg Intramuscular Once Jean Pierre Scanlon MD            The following portions of the patient's history were reviewed and updated as appropriate: allergies, current medications, past family history, past medical history, past social history, past surgical history and problem list.        Assessment/Plan   Tamara was seen today for depression and breathing problem.    Diagnoses and all orders for this visit:    Acute bronchitis, unspecified organism  -     clonazePAM (KLONOPIN) 0.5 MG tablet; Take 0.5-1 tablets by mouth 2 (Two) Times a Day As Needed for Anxiety.  -     triamcinolone acetonide (KENALOG-40) injection 40 mg    Anxiety    Other orders  -     sertraline (ZOLOFT) 25 MG tablet; Take 1 tablet by mouth Daily.      Sergey:  [id number] no activity  Start 1/2 tablet klonopin. Return 4 weeks                No follow-ups on file.                  This document has been electronically signed by Jean Pierre Scanlon MD on February 6, 2020 1:08 PM

## 2020-03-30 RX ORDER — SERTRALINE HYDROCHLORIDE 25 MG/1
TABLET, FILM COATED ORAL
Qty: 90 TABLET | Refills: 0 | Status: SHIPPED | OUTPATIENT
Start: 2020-03-30 | End: 2020-04-27

## 2020-04-27 RX ORDER — SERTRALINE HYDROCHLORIDE 25 MG/1
TABLET, FILM COATED ORAL
Qty: 90 TABLET | Refills: 3 | Status: SHIPPED | OUTPATIENT
Start: 2020-04-27 | End: 2021-05-12 | Stop reason: SDUPTHER

## 2020-08-18 ENCOUNTER — LAB (OUTPATIENT)
Dept: LAB | Facility: HOSPITAL | Age: 80
End: 2020-08-18

## 2020-08-18 ENCOUNTER — OFFICE VISIT (OUTPATIENT)
Dept: FAMILY MEDICINE CLINIC | Facility: CLINIC | Age: 80
End: 2020-08-18

## 2020-08-18 VITALS
BODY MASS INDEX: 20.56 KG/M2 | DIASTOLIC BLOOD PRESSURE: 60 MMHG | HEIGHT: 59 IN | TEMPERATURE: 98.7 F | SYSTOLIC BLOOD PRESSURE: 126 MMHG | OXYGEN SATURATION: 99 % | WEIGHT: 102 LBS | HEART RATE: 99 BPM

## 2020-08-18 DIAGNOSIS — Z00.00 ANNUAL PHYSICAL EXAM: Primary | ICD-10-CM

## 2020-08-18 DIAGNOSIS — I10 ESSENTIAL HYPERTENSION: ICD-10-CM

## 2020-08-18 DIAGNOSIS — J44.1 ACUTE EXACERBATION OF CHRONIC OBSTRUCTIVE PULMONARY DISEASE (COPD) (HCC): ICD-10-CM

## 2020-08-18 PROCEDURE — 82728 ASSAY OF FERRITIN: CPT | Performed by: FAMILY MEDICINE

## 2020-08-18 PROCEDURE — 85027 COMPLETE CBC AUTOMATED: CPT | Performed by: FAMILY MEDICINE

## 2020-08-18 PROCEDURE — 80053 COMPREHEN METABOLIC PANEL: CPT | Performed by: FAMILY MEDICINE

## 2020-08-18 PROCEDURE — 96372 THER/PROPH/DIAG INJ SC/IM: CPT | Performed by: FAMILY MEDICINE

## 2020-08-18 PROCEDURE — 99213 OFFICE O/P EST LOW 20 MIN: CPT | Performed by: FAMILY MEDICINE

## 2020-08-18 PROCEDURE — 80061 LIPID PANEL: CPT | Performed by: FAMILY MEDICINE

## 2020-08-18 RX ORDER — TRIAMCINOLONE ACETONIDE 40 MG/ML
80 INJECTION, SUSPENSION INTRA-ARTICULAR; INTRAMUSCULAR ONCE
Status: COMPLETED | OUTPATIENT
Start: 2020-08-18 | End: 2020-08-18

## 2020-08-18 RX ORDER — FUROSEMIDE 40 MG/1
40 TABLET ORAL DAILY PRN
Qty: 90 TABLET | Refills: 3 | Status: SHIPPED | OUTPATIENT
Start: 2020-08-18 | End: 2021-05-12 | Stop reason: SDUPTHER

## 2020-08-18 RX ORDER — ALBUTEROL SULFATE 90 UG/1
2 AEROSOL, METERED RESPIRATORY (INHALATION) EVERY 6 HOURS PRN
Qty: 54 G | Refills: 3 | Status: SHIPPED | OUTPATIENT
Start: 2020-08-18 | End: 2021-05-03

## 2020-08-18 RX ORDER — CLOTRIMAZOLE 1 %
CREAM (GRAM) TOPICAL 2 TIMES DAILY
Qty: 15 G | Refills: 11 | Status: SHIPPED | OUTPATIENT
Start: 2020-08-18 | End: 2022-01-01

## 2020-08-18 RX ORDER — LISINOPRIL 20 MG/1
20 TABLET ORAL DAILY
Qty: 90 TABLET | Refills: 3 | Status: SHIPPED | OUTPATIENT
Start: 2020-08-18 | End: 2021-01-01

## 2020-08-18 RX ORDER — LORATADINE 10 MG/1
10 TABLET ORAL DAILY
Qty: 90 TABLET | Refills: 3 | Status: SHIPPED | OUTPATIENT
Start: 2020-08-18 | End: 2021-01-01 | Stop reason: SDUPTHER

## 2020-08-18 RX ADMIN — TRIAMCINOLONE ACETONIDE 80 MG: 40 INJECTION, SUSPENSION INTRA-ARTICULAR; INTRAMUSCULAR at 14:25

## 2020-08-18 NOTE — PROGRESS NOTES
" Subjective   Tamara Singh is a 80 y.o. female.     History of Present Illness     CC FACE TO FACE OXYGEN RECERTIFICATION AND ANNUAL EXAM    Oxygen is definitely helping her.  She uses it every night and during the day when needed.   The setting is 2lpm via nasal canula.   Also  She needs refills and is due for some lab work.     Review of Systems   Constitutional: Negative for chills, fatigue and fever.   HENT: Negative for congestion, ear discharge, ear pain, facial swelling, hearing loss, postnasal drip, rhinorrhea, sinus pressure, sore throat, trouble swallowing and voice change.    Eyes: Negative for discharge, redness and visual disturbance.   Respiratory: Positive for shortness of breath. Negative for cough, chest tightness and wheezing.    Cardiovascular: Negative for chest pain and palpitations.   Gastrointestinal: Negative for abdominal pain, blood in stool, constipation, diarrhea, nausea and vomiting.   Endocrine: Negative for polydipsia and polyuria.   Genitourinary: Negative for dysuria, flank pain, hematuria and urgency.   Musculoskeletal: Negative for arthralgias, back pain, joint swelling and myalgias.   Skin: Negative for rash.   Neurological: Negative for dizziness, weakness, numbness and headaches.   Hematological: Negative for adenopathy.   Psychiatric/Behavioral: Negative for confusion and sleep disturbance. The patient is not nervous/anxious.            /60 (BP Location: Left arm, Patient Position: Sitting, Cuff Size: Adult)   Pulse 99   Temp 98.7 °F (37.1 °C) (Temporal)   Ht 149.9 cm (59.02\")   Wt 46.3 kg (102 lb)   SpO2 99%   BMI 20.59 kg/m²       Objective     Physical Exam   Constitutional: She is oriented to person, place, and time. She appears well-developed and well-nourished.   HENT:   Head: Normocephalic and atraumatic.   Right Ear: External ear normal.   Left Ear: External ear normal.   Nose: Nose normal.   Eyes: Pupils are equal, round, and reactive to light. " Conjunctivae and EOM are normal.   Neck: Normal range of motion.   Cardiovascular: Normal rate and regular rhythm.   Pulmonary/Chest: Effort normal. She has wheezes.   tight   Musculoskeletal: Normal range of motion.   Neurological: She is alert and oriented to person, place, and time.   Psychiatric: She has a normal mood and affect. Her behavior is normal. Judgment and thought content normal.   Nursing note and vitals reviewed.          PAST MEDICAL HISTORY     Past Medical History:   Diagnosis Date   • Acute bronchitis    • Artificial lens present     in position    • Backache    • Borderline glaucoma    • Borderline glaucoma    • Chronic obstructive lung disease (CMS/HCC)    • Chronic obstructive lung disease (CMS/HCC)    • Common cold    • Dysphagia    • Dyspnea    • Edema    • Encounter for immunization    • Epigastric pain    • Essential hypertension    • External hemorrhoids without complication    • Fever    • Heartburn    • Low back pain    • Malignant neoplasm (CMS/HCC)     Malignant neoplasm of other specified part of esophagus   • Malignant tumor of lower third of esophagus (CMS/HCC)    • Nuclear cataract    • Perleche    • Pneumathemia (CMS/HCC)     UNSPECIFIED ORGANISM   • Shoulder joint pain    • Upper respiratory infection    • Vitamin D deficiency    • Wheezing       PAST SURGICAL HISTORY     Past Surgical History:   Procedure Laterality Date   • CATARACT EXTRACTION  08/06/2015    Remove cataract, insert lens (Left eye.)   • CHOLECYSTECTOMY  07/14/2004   • COLONOSCOPY  05/23/2013    Colon endoscopy 48722 (Internal & external hemorrhoids found.)   • ENDOSCOPY  11/26/2014    EGD w/ biopsy 78713 (Normal esophagus. Biopsy taken. Normal stomach. Normal duodenum.)   11/26/2014 GURPREET BHAT    • ENDOSCOPY  03/19/2014    EGD w/ tube 72330 (Esophagitis seen. Biopsy taken. No evidence of esophageal ca. Gastritis in stomach. Biopsy taken. Normal duodenum.)   • ENDOSCOPY  10/02/2013    EGD w/ tube 62993 (Normal  esopahgus, stoamch, & duodenum. No evidence of tumor. Biopsy taken.)   • ENDOSCOPY  2013    EGD w/ tube 15627 (Tumor in distal 3rd of esophagus. Biopsy taken. Lesion runs from 28 cms to 34 cms. Normal stomach & duodenum.)   • INJECTION OF MEDICATION  2016    Kenalog (5)      • OTHER SURGICAL HISTORY  12/10/2014    Drain/Inject Major Joint  (Shoulder joint pain)    • OTHER SURGICAL HISTORY      OCT DISC NFL 90040 (Primary open angle glaucoma (2): 2016, 2015   • OTHER SURGICAL HISTORY  2016    OPTIC NERVE HEAD EVAL PERFORMED  (Primary open angle glaucoma)       SOCIAL HISTORY     Social History     Socioeconomic History   • Marital status:      Spouse name: Not on file   • Number of children: Not on file   • Years of education: Not on file   • Highest education level: Not on file   Tobacco Use   • Smoking status: Former Smoker     Types: Cigarettes     Last attempt to quit: 2014     Years since quittin.6   • Smokeless tobacco: Never Used   Substance and Sexual Activity   • Alcohol use: No   • Drug use: No   • Sexual activity: Defer      ALLERGIES   Patient has no known allergies.   MEDICATIONS     Current Outpatient Medications   Medication Sig Dispense Refill   • albuterol sulfate HFA (Ventolin HFA) 108 (90 Base) MCG/ACT inhaler Inhale 2 puffs Every 6 (Six) Hours As Needed for Wheezing. 54 g 3   • Cholecalciferol 18065 UNITS capsule Take 1 capsule by mouth daily.     • furosemide (LASIX) 40 MG tablet Take 1 tablet by mouth Daily As Needed (SWELLING). 90 tablet 3   • ipratropium-albuterol (Combivent Respimat)  MCG/ACT inhaler Inhale 1 puff 4 (Four) Times a Day. 12 g 3   • ipratropium-albuterol (DUO-NEB) 0.5-2.5 mg/3 ml nebulizer Take 3 mL by nebulization 4 (Four) Times a Day. 360 mL 12   • latanoprost (XALATAN) 0.005 % ophthalmic solution INSTILL 1 DROP INTO EACH EYE AT BEDTIME     • lisinopril (PRINIVIL,ZESTRIL) 20 MG tablet Take 1 tablet by mouth Daily. 90  tablet 3   • loratadine (Claritin) 10 MG tablet Take 1 tablet by mouth Daily. 90 tablet 3   • sertraline (ZOLOFT) 25 MG tablet Take 1 tablet by mouth once daily 90 tablet 3   • clonazePAM (KLONOPIN) 0.5 MG tablet Take 0.5-1 tablets by mouth 2 (Two) Times a Day As Needed for Anxiety. 30 tablet 0   • clotrimazole (LOTRIMIN) 1 % cream Apply  topically to the appropriate area as directed 2 (Two) Times a Day. 15 g 11     No current facility-administered medications for this visit.         The following portions of the patient's history were reviewed and updated as appropriate: allergies, current medications, past family history, past medical history, past social history, past surgical history and problem list.        Assessment/Plan   Tamara was seen today for o2 recert and annual exam.    Diagnoses and all orders for this visit:    Annual physical exam    Essential hypertension  -     CBC (No Diff)  -     Comprehensive Metabolic Panel  -     Lipid Panel  -     lisinopril (PRINIVIL,ZESTRIL) 20 MG tablet; Take 1 tablet by mouth Daily.    Acute exacerbation of chronic obstructive pulmonary disease (COPD) (CMS/[facility])  -     triamcinolone acetonide (KENALOG-40) injection 80 mg    Other orders  -     loratadine (Claritin) 10 MG tablet; Take 1 tablet by mouth Daily.  -     furosemide (LASIX) 40 MG tablet; Take 1 tablet by mouth Daily As Needed (SWELLING).  -     albuterol sulfate HFA (Ventolin HFA) 108 (90 Base) MCG/ACT inhaler; Inhale 2 puffs Every 6 (Six) Hours As Needed for Wheezing.  -     ipratropium-albuterol (Combivent Respimat)  MCG/ACT inhaler; Inhale 1 puff 4 (Four) Times a Day.  -     clotrimazole (LOTRIMIN) 1 % cream; Apply  topically to the appropriate area as directed 2 (Two) Times a Day.                       No follow-ups on file.                  This document has been electronically signed by Jean Pierre Scanlon MD on August 18, 2020 14:15

## 2020-08-19 LAB
ALBUMIN SERPL-MCNC: 4.6 G/DL (ref 3.5–5.2)
ALBUMIN/GLOB SERPL: 1.6 G/DL
ALP SERPL-CCNC: 111 U/L (ref 39–117)
ALT SERPL W P-5'-P-CCNC: 25 U/L (ref 1–33)
ANION GAP SERPL CALCULATED.3IONS-SCNC: 14.9 MMOL/L (ref 5–15)
AST SERPL-CCNC: 45 U/L (ref 1–32)
BILIRUB SERPL-MCNC: 0.3 MG/DL (ref 0–1.2)
BUN SERPL-MCNC: 25 MG/DL (ref 8–23)
BUN/CREAT SERPL: 20.8 (ref 7–25)
CALCIUM SPEC-SCNC: 10.2 MG/DL (ref 8.6–10.5)
CHLORIDE SERPL-SCNC: 97 MMOL/L (ref 98–107)
CHOLEST SERPL-MCNC: 211 MG/DL (ref 0–200)
CO2 SERPL-SCNC: 24.1 MMOL/L (ref 22–29)
CREAT SERPL-MCNC: 1.2 MG/DL (ref 0.57–1)
DEPRECATED RDW RBC AUTO: 45.9 FL (ref 37–54)
ERYTHROCYTE [DISTWIDTH] IN BLOOD BY AUTOMATED COUNT: 13.1 % (ref 12.3–15.4)
GFR SERPL CREATININE-BSD FRML MDRD: 43 ML/MIN/1.73
GLOBULIN UR ELPH-MCNC: 2.9 GM/DL
GLUCOSE SERPL-MCNC: 137 MG/DL (ref 65–99)
HCT VFR BLD AUTO: 33.8 % (ref 34–46.6)
HDLC SERPL-MCNC: 101 MG/DL (ref 40–60)
HGB BLD-MCNC: 10.9 G/DL (ref 12–15.9)
LDLC SERPL CALC-MCNC: 95 MG/DL (ref 0–100)
LDLC/HDLC SERPL: 0.94 {RATIO}
MCH RBC QN AUTO: 30.9 PG (ref 26.6–33)
MCHC RBC AUTO-ENTMCNC: 32.2 G/DL (ref 31.5–35.7)
MCV RBC AUTO: 95.8 FL (ref 79–97)
PLATELET # BLD AUTO: 264 10*3/MM3 (ref 140–450)
PMV BLD AUTO: 10.7 FL (ref 6–12)
POTASSIUM SERPL-SCNC: 4.8 MMOL/L (ref 3.5–5.2)
PROT SERPL-MCNC: 7.5 G/DL (ref 6–8.5)
RBC # BLD AUTO: 3.53 10*6/MM3 (ref 3.77–5.28)
SODIUM SERPL-SCNC: 136 MMOL/L (ref 136–145)
TRIGL SERPL-MCNC: 73 MG/DL (ref 0–150)
VLDLC SERPL-MCNC: 14.6 MG/DL (ref 5–40)
WBC # BLD AUTO: 4.56 10*3/MM3 (ref 3.4–10.8)

## 2020-08-20 DIAGNOSIS — D64.9 ANEMIA, UNSPECIFIED TYPE: Primary | ICD-10-CM

## 2020-08-20 LAB — FERRITIN SERPL-MCNC: 332 NG/ML (ref 13–150)

## 2020-08-21 ENCOUNTER — LAB (OUTPATIENT)
Dept: LAB | Facility: HOSPITAL | Age: 80
End: 2020-08-21

## 2020-08-21 DIAGNOSIS — D64.9 ANEMIA, UNSPECIFIED TYPE: ICD-10-CM

## 2020-08-21 PROCEDURE — 82607 VITAMIN B-12: CPT | Performed by: FAMILY MEDICINE

## 2020-08-21 PROCEDURE — 82746 ASSAY OF FOLIC ACID SERUM: CPT | Performed by: FAMILY MEDICINE

## 2020-08-22 LAB
FOLATE SERPL-MCNC: 13.3 NG/ML (ref 4.78–24.2)
VIT B12 BLD-MCNC: 538 PG/ML (ref 211–946)

## 2020-10-06 ENCOUNTER — OFFICE VISIT (OUTPATIENT)
Dept: FAMILY MEDICINE CLINIC | Facility: CLINIC | Age: 80
End: 2020-10-06

## 2020-10-06 VITALS
BODY MASS INDEX: 20.24 KG/M2 | OXYGEN SATURATION: 98 % | HEART RATE: 90 BPM | WEIGHT: 100.4 LBS | HEIGHT: 59 IN | SYSTOLIC BLOOD PRESSURE: 130 MMHG | TEMPERATURE: 97.1 F | DIASTOLIC BLOOD PRESSURE: 62 MMHG

## 2020-10-06 DIAGNOSIS — Z23 NEED FOR INFLUENZA VACCINATION: ICD-10-CM

## 2020-10-06 DIAGNOSIS — Z23 NEED FOR SHINGLES VACCINE: ICD-10-CM

## 2020-10-06 DIAGNOSIS — Z00.00 MEDICARE ANNUAL WELLNESS VISIT, SUBSEQUENT: Primary | ICD-10-CM

## 2020-10-06 PROCEDURE — G0439 PPPS, SUBSEQ VISIT: HCPCS | Performed by: NURSE PRACTITIONER

## 2020-10-06 PROCEDURE — G0008 ADMIN INFLUENZA VIRUS VAC: HCPCS | Performed by: NURSE PRACTITIONER

## 2020-10-06 PROCEDURE — 90694 VACC AIIV4 NO PRSRV 0.5ML IM: CPT | Performed by: NURSE PRACTITIONER

## 2020-10-06 NOTE — PROGRESS NOTES
The ABCs of the Annual Wellness Visit  Subsequent Medicare Wellness Visit    Chief Complaint   Patient presents with   • Medicare Wellness-subsequent       Subjective   History of Present Illness:  Tamara Singh is a 80 y.o. female who presents for a Subsequent Medicare Wellness Visit. Lives home alone. Has nephew next door. Uses cane to assist with ambulation.    HEALTH RISK ASSESSMENT    Recent Hospitalizations:  No hospitalization(s) within the last year.    Current Medical Providers:  Patient Care Team:  Jean Pierre Scanlon MD as PCP - General  SellersCampos MD as PCP - Claims Attributed  Jaun Mathew MD as Consulting Physician (Hematology and Oncology)  Iva Aj APRN as Nurse Practitioner (Oncology)    Smoking Status:  Social History     Tobacco Use   Smoking Status Former Smoker   • Packs/day: 1.00   • Types: Cigarettes   • Quit date: 2014   • Years since quittin.7   Smokeless Tobacco Never Used       Alcohol Consumption:  Social History     Substance and Sexual Activity   Alcohol Use No       Depression Screen:   PHQ-2/PHQ-9 Depression Screening 10/6/2020   Little interest or pleasure in doing things 0   Feeling down, depressed, or hopeless 0   Total Score 0       Fall Risk Screen:  STEADI Fall Risk Assessment was completed, and patient is at MODERATE risk for falls. Assessment completed on:2020    Health Habits and Functional and Cognitive Screening:  Functional & Cognitive Status 10/6/2020   Do you have difficulty preparing food and eating? No   Do you have difficulty bathing yourself, getting dressed or grooming yourself? No   Do you have difficulty using the toilet? No   Do you have difficulty moving around from place to place? No   Do you have trouble with steps or getting out of a bed or a chair? No   Current Diet Well Balanced Diet   Dental Exam Not up to date        Dental Exam Comment -   Eye Exam Up to date   Exercise (times per week) 0 times per week   Current  Exercise Activities Include None   Do you need help using the phone?  No   Are you deaf or do you have serious difficulty hearing?  Yes   Do you need help with transportation? No   Do you need help shopping? Yes   Do you need help preparing meals?  Yes   Do you need help with housework?  No   Do you need help with laundry? Yes   Do you need help taking your medications? No   Do you need help managing money? Yes   Do you ever drive or ride in a car without wearing a seat belt? No   Have you felt unusual stress, anger or loneliness in the last month? Yes   Who do you live with? Alone   If you need help, do you have trouble finding someone available to you? No   Have you been bothered in the last four weeks by sexual problems? No   Do you have difficulty concentrating, remembering or making decisions? No         Does the patient have evidence of cognitive impairment? No    Asprin use counseling:Does not need ASA (and currently is not on it)    Age-appropriate Screening Schedule:  Refer to the list below for future screening recommendations based on patient's age, sex and/or medical conditions. Orders for these recommended tests are listed in the plan section. The patient has been provided with a written plan.    Health Maintenance   Topic Date Due   • MAMMOGRAM  10/06/2021 (Originally 4/2/2020)   • ZOSTER VACCINE (2 of 2) 10/06/2021 (Originally 5/28/2018)   • TDAP/TD VACCINES (1 - Tdap) 10/12/2021 (Originally 5/25/1959)   • COLONOSCOPY  05/23/2023   • INFLUENZA VACCINE  Completed          The following portions of the patient's history were reviewed and updated as appropriate: allergies, current medications, past family history, past medical history, past social history, past surgical history and problem list.    Outpatient Medications Prior to Visit   Medication Sig Dispense Refill   • albuterol sulfate HFA (Ventolin HFA) 108 (90 Base) MCG/ACT inhaler Inhale 2 puffs Every 6 (Six) Hours As Needed for Wheezing. 54 g 3      • Cholecalciferol 01728 UNITS capsule Take 1 capsule by mouth daily.     • clotrimazole (LOTRIMIN) 1 % cream Apply  topically to the appropriate area as directed 2 (Two) Times a Day. 15 g 11   • furosemide (LASIX) 40 MG tablet Take 1 tablet by mouth Daily As Needed (SWELLING). 90 tablet 3   • ipratropium-albuterol (Combivent Respimat)  MCG/ACT inhaler Inhale 1 puff 4 (Four) Times a Day. 12 g 3   • ipratropium-albuterol (DUO-NEB) 0.5-2.5 mg/3 ml nebulizer Take 3 mL by nebulization 4 (Four) Times a Day. 360 mL 12   • latanoprost (XALATAN) 0.005 % ophthalmic solution INSTILL 1 DROP INTO EACH EYE AT BEDTIME     • lisinopril (PRINIVIL,ZESTRIL) 20 MG tablet Take 1 tablet by mouth Daily. 90 tablet 3   • loratadine (Claritin) 10 MG tablet Take 1 tablet by mouth Daily. 90 tablet 3   • sertraline (ZOLOFT) 25 MG tablet Take 1 tablet by mouth once daily 90 tablet 3   • clonazePAM (KLONOPIN) 0.5 MG tablet Take 0.5-1 tablets by mouth 2 (Two) Times a Day As Needed for Anxiety. 30 tablet 0     No facility-administered medications prior to visit.        Patient Active Problem List   Diagnosis   • Nuclear sclerosis   • Artificial lens present   • Borderline glaucoma   • Corneal endothelial dystrophy   • Pseudophakia   • Cortical age-related cataract   • Nuclear cataract   • History of esophageal cancer   • Acute kidney injury (CMS/HCC)       Advanced Care Planning:  ACP discussion was declined by the patient. Patient does not have an advance directive, declines further assistance.    Review of Systems   Constitutional: Negative for chills, fatigue, fever and unexpected weight change.   HENT: Negative for congestion, ear pain, hearing loss, sore throat and trouble swallowing.    Eyes: Negative for pain, discharge, itching and visual disturbance.   Respiratory: Negative for cough, chest tightness, shortness of breath and wheezing.    Cardiovascular: Negative for chest pain, palpitations and leg swelling.   Gastrointestinal:  "Negative for abdominal pain, constipation, diarrhea, nausea and vomiting.   Endocrine: Negative for cold intolerance and heat intolerance.   Genitourinary: Negative for decreased urine volume, difficulty urinating, dysuria and hematuria.   Musculoskeletal: Negative for arthralgias, back pain, gait problem, neck pain and neck stiffness.   Skin: Negative for pallor, rash and wound.   Allergic/Immunologic: Negative for environmental allergies, food allergies and immunocompromised state.   Neurological: Negative for dizziness, seizures, syncope, speech difficulty, weakness, light-headedness and headaches.   Hematological: Negative for adenopathy. Does not bruise/bleed easily.   Psychiatric/Behavioral: Negative for agitation, behavioral problems, confusion, dysphoric mood, self-injury, sleep disturbance and suicidal ideas. The patient is not nervous/anxious.        Compared to one year ago, the patient feels her physical health is the same.  Compared to one year ago, the patient feels her mental health is worse.    Reviewed chart for potential of high risk medication in the elderly: yes  Reviewed chart for potential of harmful drug interactions in the elderly:yes    Objective         Vitals:    10/06/20 1146   BP: 130/62   BP Location: Right arm   Patient Position: Sitting   Cuff Size: Adult   Pulse: 90   Temp: 97.1 °F (36.2 °C)   TempSrc: Infrared   SpO2: 98%   Weight: 45.5 kg (100 lb 6.4 oz)   Height: 149.9 cm (59.02\")   PainSc:   8   PainLoc: Head       Body mass index is 20.27 kg/m².  Discussed the patient's BMI with her. The BMI is in the acceptable range.    Physical Exam  Constitutional:       Appearance: She is well-developed.   HENT:      Head: Normocephalic.      Right Ear: External ear normal.      Left Ear: External ear normal.      Nose: Nose normal.      Mouth/Throat:      Pharynx: No oropharyngeal exudate.   Eyes:      Conjunctiva/sclera: Conjunctivae normal.      Pupils: Pupils are equal, round, and " reactive to light.   Neck:      Musculoskeletal: Normal range of motion and neck supple.      Thyroid: No thyromegaly.   Cardiovascular:      Rate and Rhythm: Normal rate and regular rhythm.   Pulmonary:      Effort: Pulmonary effort is normal.      Breath sounds: Normal breath sounds.   Musculoskeletal: Normal range of motion.   Skin:     General: Skin is warm and dry.   Neurological:      Mental Status: She is alert and oriented to person, place, and time.   Psychiatric:         Behavior: Behavior normal.         Thought Content: Thought content normal.         Judgment: Judgment normal.         Lab Results   Component Value Date    TRIG 73 08/18/2020     (H) 08/18/2020    LDL 95 08/18/2020    VLDL 14.6 08/18/2020        Assessment/Plan   Medicare Risks and Personalized Health Plan  CMS Preventative Services Quick Reference  Fall Risk  Hearing Problem  Osteoprorosis Risk  Polypharmacy    The above risks/problems have been discussed with the patient.  Pertinent information has been shared with the patient in the After Visit Summary.  Follow up plans and orders are seen below in the Assessment/Plan Section.    Diagnoses and all orders for this visit:    1. Medicare annual wellness visit, subsequent (Primary)    2. Need for shingles vaccine    3. Need for influenza vaccination  -     Fluad Quad >65 years    Other orders  -     Fluad Quad 65+ yrs ([id number])    Rx for shingles vaccine provided, pt instructed to bring to [facility] or other participating local pharmacies.   Flu vaccine administered.  Discussed importance of healthy diet and exercise to maintain.  Follow Up:  No follow-ups on file.     An After Visit Summary and PPPS were given to the patient.           This document has been electronically signed by LIBBY Palacios on  October 7, 2020 08:22 CDT

## 2020-10-06 NOTE — PATIENT INSTRUCTIONS
Breast Self-Awareness  Breast self-awareness is knowing how your breasts look and feel. Doing breast self-awareness is important. It allows you to catch a breast problem early while it is still small and can be treated. All women should do breast self-awareness, including women who have had breast implants. Tell your doctor if you notice a change in your breasts.  What you need:  · A mirror.  · A well-lit room.  How to do a breast self-exam  A breast self-exam is one way to learn what is normal for your breasts and to check for changes. To do a breast self-exam:  Look for changes    1. Take off all the clothes above your waist.  2.  front of a mirror in a room with good lighting.  3. Put your hands on your hips.  4. Push your hands down.  5. Look at your breasts and nipples in the mirror to see if one breast or nipple looks different from the other. Check to see if:  ? The shape of one breast is different.  ? The size of one breast is different.  ? There are wrinkles, dips, and bumps in one breast and not the other.  6. Look at each breast for changes in the skin, such as:  ? Redness.  ? Scaly areas.  7. Look for changes in your nipples, such as:  ? Liquid around the nipples.  ? Bleeding.  ? Dimpling.  ? Redness.  ? A change in where the nipples are.  Feel for changes    1. Lie on your back on the floor.  2. Feel each breast. To do this, follow these steps:  ? Pick a breast to feel.  ? Put the arm closest to that breast above your head.  ? Use your other arm to feel the nipple area of your breast. Feel the area with the pads of your three middle fingers by making small circles with your fingers. For the first [place], press lightly. For the second [place], press harder. For the third [place], press even harder.  ? Keep making circles with your fingers at the different pressures as you move down your breast. Stop when you feel your ribs.  ? Move your fingers a little toward the center of your body.  ? Start  making circles with your fingers again, this time going up until you reach your collarbone.  ? Keep making up-and-down circles until you reach your armpit. Remember to keep using the three pressures.  ? Feel the other breast in the same way.  3. Sit or  the tub or shower.  4. With soapy water on your skin, feel each breast the same way you did in step 2 when you were lying on the floor.  Write down what you find  Writing down what you find can help you remember what to tell your doctor. Write down:  · What is normal for each breast.  · Any changes you find in each breast, including:  ? The kind of changes you find.  ? Whether you have pain.  ? Size and location of any lumps.  · When you last had your menstrual period.  General tips  · Check your breasts every month.  · If you are breastfeeding, the best time to check your breasts is after you feed your baby or after you use a breast pump.  · If you get menstrual periods, the best time to check your breasts is 5-7 days after your menstrual period is over.  · With time, you will become comfortable with the self-exam, and you will begin to know if there are changes in your breasts.  Contact a doctor if you:  · See a change in the shape or size of your breasts or nipples.  · See a change in the skin of your breast or nipples, such as red or scaly skin.  · Have fluid coming from your nipples that is not normal.  · Find a lump or thick area that was not there before.  · Have pain in your breasts.  · Have any concerns about your breast health.  Summary  · Breast self-awareness includes looking for changes in your breasts, as well as feeling for changes within your breasts.  · Breast self-awareness should be done in front of a mirror in a well-lit room.  · You should check your breasts every month. If you get menstrual periods, the best time to check your breasts is 5-7 days after your menstrual period is over.  · Let your doctor know of any changes you see in your  breasts, including changes in size, changes on the skin, pain or tenderness, or fluid from your nipples that is not normal.  This information is not intended to replace advice given to you by your health care provider. Make sure you discuss any questions you have with your health care provider.  Document Released: 06/05/2009 Document Revised: 08/06/2019 Document Reviewed: 08/06/2019  [employer] Patient Education © 2020 [employer]      Exercising to Stay Healthy  To become healthy and stay healthy, it is recommended that you do moderate-intensity and vigorous-intensity exercise. You can tell that you are exercising at a moderate intensity if your heart starts beating faster and you start breathing faster but can still hold a conversation. You can tell that you are exercising at a vigorous intensity if you are breathing much harder and faster and cannot hold a conversation while exercising.  Exercising regularly is important. It has many health benefits, such as:  · Improving overall fitness, flexibility, and endurance.  · Increasing bone density.  · Helping with weight control.  · Decreasing body fat.  · Increasing muscle strength.  · Reducing stress and tension.  · Improving overall health.  How often should I exercise?  Choose an activity that you enjoy, and set realistic goals. Your health care provider can help you make an activity plan that works for you.  Exercise regularly as told by your health care provider. This may include:  · Doing strength training two times a week, such as:  ? Lifting weights.  ? Using resistance bands.  ? Push-ups.  ? Sit-ups.  ? Yoga.  · Doing a certain intensity of exercise for a given amount of time. Choose from these options:  ? A total of 150 minutes of moderate-intensity exercise every week.  ? A total of 75 minutes of vigorous-intensity exercise every week.  ? A mix of moderate-intensity and vigorous-intensity exercise every week.  Children, pregnant women, people who have not  exercised regularly, people who are overweight, and older adults may need to talk with a health care provider about what activities are safe to do. If you have a medical condition, be sure to talk with your health care provider before you start a new exercise program.  What are some exercise ideas?  Moderate-intensity exercise ideas include:  · Walking 1 mile (1.6 km) in about 15 minutes.  · Biking.  · Hiking.  · Golfing.  · Dancing.  · Water aerobics.  Vigorous-intensity exercise ideas include:  · Walking 4.5 miles (7.2 km) or more in about 1 hour.  · Jogging or running 5 miles (8 km) in about 1 hour.  · Biking 10 miles (16.1 km) or more in about 1 hour.  · Lap swimming.  · Roller-skating or in-line skating.  · Cross-country skiing.  · Vigorous competitive sports, such as football, basketball, and soccer.  · Jumping rope.  · Aerobic dancing.  What are some everyday activities that can help me to get exercise?  · Yard work, such as:  ? Pushing a .  ? Raking and bagging leaves.  · Washing your car.  · Pushing a stroller.  · Shoveling snow.  · Gardening.  · Washing windows or floors.  How can I be more active in my day-to-day activities?  · Use stairs instead of an elevator.  · Take a walk during your lunch break.  · If you drive, park your car farther away from your work or school.  · If you take public transportation, get off one stop early and walk the rest of the way.  · Stand up or walk around during all of your indoor phone calls.  · Get up, stretch, and walk around every 30 minutes throughout the day.  · Enjoy exercise with a friend. Support to continue exercising will help you keep a regular routine of activity.  What guidelines can I follow while exercising?  · Before you start a new exercise program, talk with your health care provider.  · Do not exercise so much that you hurt yourself, feel dizzy, or get very short of breath.  · Wear comfortable clothes and wear shoes with good support.  · Drink  plenty of water while you exercise to prevent dehydration or heat stroke.  · Work out until your breathing and your heartbeat get faster.  Where to find more information  · [place] Department of Health and Human Services: www.hhs.gov  · Centers for Disease Control and Prevention (CDC): www.cdc.gov  Summary  · Exercising regularly is important. It will improve your overall fitness, flexibility, and endurance.  · Regular exercise also will improve your overall health. It can help you control your weight, reduce stress, and improve your bone density.  · Do not exercise so much that you hurt yourself, feel dizzy, or get very short of breath.  · Before you start a new exercise program, talk with your health care provider.  This information is not intended to replace advice given to you by your health care provider. Make sure you discuss any questions you have with your health care provider.  Document Released: 01/20/2012 Document Revised: 11/30/2018 Document Reviewed: 11/08/2018  [employer] Patient Education © 2020 [employer]      Fall Prevention in the Home, Adult  Falls can cause injuries and can affect people from all age groups. There are many simple things that you can do to make your home safe and to help prevent falls. Ask for help when making these changes, if needed.  What actions can I take to prevent falls?  General instructions  · Use good lighting in all rooms. Replace any light bulbs that burn out.  · Turn on lights if it is dark. Use night-lights.  · Place frequently used items in easy-to-reach places. Lower the shelves around your home if necessary.  · Set up furniture so that there are clear paths around it. Avoid moving your furniture around.  · Remove throw rugs and other tripping hazards from the floor.  · Avoid walking on wet floors.  · Fix any uneven floor surfaces.  · Add color or contrast paint or tape to grab bars and handrails in your home. Place contrasting color strips on the first and last  steps of stairways.  · When you use a stepladder, make sure that it is completely opened and that the sides are firmly locked. Have someone hold the ladder while you are using it. Do not climb a closed stepladder.  · Be aware of any and all pets.  What can I do in the bathroom?         · Keep the floor dry. Immediately clean up any water that spills onto the floor.  · Remove soap buildup in the tub or shower on a regular basis.  · Use non-skid mats or decals on the floor of the tub or shower.  · Attach bath mats securely with double-sided, non-slip rug tape.  · If you need to sit down while you are in the shower, use a plastic, non-slip stool.  · Install grab bars by the toilet and in the tub and shower. Do not use towel bars as grab bars.  What can I do in the bedroom?  · Make sure that a bedside light is easy to reach.  · Do not use oversized bedding that drapes onto the floor.  · Have a firm chair that has side arms to use for getting dressed.  What can I do in the kitchen?  · Clean up any spills right away.  · If you need to reach for something above you, use a sturdy step stool that has a grab bar.  · Keep electrical cables out of the way.  · Do not use floor polish or wax that makes floors slippery. If you must use wax, make sure that it is non-skid floor wax.  What can I do in the stairways?  · Do not leave any items on the stairs.  · Make sure that you have a light switch at the top of the stairs and the bottom of the stairs. Have them installed if you do not have them.  · Make sure that there are handrails on both sides of the stairs. Fix handrails that are broken or loose. Make sure that handrails are as long as the stairways.  · Install non-slip stair treads on all stairs in your home.  · Avoid having throw rugs at the top or bottom of stairways, or secure the rugs with carpet tape to prevent them from moving.  · Choose a carpet design that does not hide the edge of steps on the stairway.  · Check any  carpeting to make sure that it is firmly attached to the stairs. Fix any carpet that is loose or worn.  What can I do on the outside of my home?  · Use bright outdoor lighting.  · Regularly repair the edges of walkways and driveways and fix any cracks.  · Remove high doorway thresholds.  · Trim any shrubbery on the main path into your home.  · Regularly check that handrails are securely fastened and in good repair. Both sides of any steps should have handrails.  · Install guardrails along the edges of any raised decks or porches.  · Clear walkways of debris and clutter, including tools and rocks.  · Have leaves, snow, and ice cleared regularly.  · Use sand or salt on walkways during winter months.  · In the garage, clean up any spills right away, including grease or oil spills.  What other actions can I take?  · Wear closed-toe shoes that fit well and support your feet. Wear shoes that have rubber soles or low heels.  · Use mobility aids as needed, such as canes, walkers, scooters, and crutches.  · Review your medicines with your health care provider. Some medicines can cause dizziness or changes in blood pressure, which increase your risk of falling.  Talk with your health care provider about other ways that you can decrease your risk of falls. This may include working with a physical therapist or  to improve your strength, balance, and endurance.  Where to find more information  · Centers for Disease Control and Prevention, [place]: https://www.cdc.gov  · National [place] on Aging: https://mg0ndbe.sugar.nih.gov  Contact a health care provider if:  · You are afraid of falling at home.  · You feel weak, drowsy, or dizzy at home.  · You fall at home.  Summary  · There are many simple things that you can do to make your home safe and to help prevent falls.  · Ways to make your home safe include removing tripping hazards and installing grab bars in the bathroom.  · Ask for help when making these changes in your  home.  This information is not intended to replace advice given to you by your health care provider. Make sure you discuss any questions you have with your health care provider.  Document Released: 12/08/2003 Document Revised: 11/30/2018 Document Reviewed: 08/02/2018  [employer] Patient Education © 2020 [employer]      Fall Prevention in the Home, Adult  Falls can cause injuries. They can happen to people of all ages. There are many things you can do to make your home safe and to help prevent falls. Ask for help when making these changes, if needed.  What actions can I take to prevent falls?  General Instructions  · Use good lighting in all rooms. Replace any light bulbs that burn out.  · Turn on the lights when you go into a dark area. Use night-lights.  · Keep items that you use often in easy-to-reach places. Lower the shelves around your home if necessary.  · Set up your furniture so you have a clear path. Avoid moving your furniture around.  · Do not have throw rugs and other things on the floor that can make you trip.  · Avoid walking on wet floors.  · If any of your floors are uneven, fix them.  · Add color or contrast paint or tape to clearly tadeo and help you see:  ? Any grab bars or handrails.  ? First and last steps of stairways.  ? Where the edge of each step is.  · If you use a stepladder:  ? Make sure that it is fully opened. Do not climb a closed stepladder.  ? Make sure that both sides of the stepladder are locked into place.  ? Ask someone to hold the stepladder for you while you use it.  · If there are any pets around you, be aware of where they are.  What can I do in the bathroom?         · Keep the floor dry. Clean up any water that spills onto the floor as soon as it happens.  · Remove soap buildup in the tub or shower regularly.  · Use non-skid mats or decals on the floor of the tub or shower.  · Attach bath mats securely with double-sided, non-slip rug tape.  · If you need to sit down in the  shower, use a plastic, non-slip stool.  · Install grab bars by the toilet and in the tub and shower. Do not use towel bars as grab bars.  What can I do in the bedroom?  · Make sure that you have a light by your bed that is easy to reach.  · Do not use any sheets or blankets that are too big for your bed. They should not hang down onto the floor.  · Have a firm chair that has side arms. You can use this for support while you get dressed.  What can I do in the kitchen?  · Clean up any spills right away.  · If you need to reach something above you, use a strong step stool that has a grab bar.  · Keep electrical cords out of the way.  · Do not use floor polish or wax that makes floors slippery. If you must use wax, use non-skid floor wax.  What can I do with my stairs?  · Do not leave any items on the stairs.  · Make sure that you have a light switch at the top of the stairs and the bottom of the stairs. If you do not have them, ask someone to add them for you.  · Make sure that there are handrails on both sides of the stairs, and use them. Fix handrails that are broken or loose. Make sure that handrails are as long as the stairways.  · Install non-slip stair treads on all stairs in your home.  · Avoid having throw rugs at the top or bottom of the stairs. If you do have throw rugs, attach them to the floor with carpet tape.  · Choose a carpet that does not hide the edge of the steps on the stairway.  · Check any carpeting to make sure that it is firmly attached to the stairs. Fix any carpet that is loose or worn.  What can I do on the outside of my home?  · Use bright outdoor lighting.  · Regularly fix the edges of walkways and driveways and fix any cracks.  · Remove anything that might make you trip as you walk through a door, such as a raised step or threshold.  · Trim any bushes or trees on the path to your home.  · Regularly check to see if handrails are loose or broken. Make sure that both sides of any steps have  handrails.  · Install guardrails along the edges of any raised decks and porches.  · Clear walking paths of anything that might make someone trip, such as tools or rocks.  · Have any leaves, snow, or ice cleared regularly.  · Use sand or salt on walking paths during winter.  · Clean up any spills in your garage right away. This includes grease or oil spills.  What other actions can I take?  · Wear shoes that:  ? Have a low heel. Do not wear high heels.  ? Have rubber bottoms.  ? Are comfortable and fit you well.  ? Are closed at the toe. Do not wear open-toe sandals.  · Use tools that help you move around (mobility aids) if they are needed. These include:  ? Canes.  ? Walkers.  ? Scooters.  ? Crutches.  · Review your medicines with your doctor. Some medicines can make you feel dizzy. This can increase your chance of falling.  Ask your doctor what other things you can do to help prevent falls.  Where to find more information  · Centers for Disease Control and Prevention, [place]: https://cdc.gov  · National [place] on Aging: https://mz1qzyn.sugar.nih.gov  Contact a doctor if:  · You are afraid of falling at home.  · You feel weak, drowsy, or dizzy at home.  · You fall at home.  Summary  · There are many simple things that you can do to make your home safe and to help prevent falls.  · Ways to make your home safe include removing tripping hazards and installing grab bars in the bathroom.  · Ask for help when making these changes in your home.  This information is not intended to replace advice given to you by your health care provider. Make sure you discuss any questions you have with your health care provider.  Document Released: 10/14/2010 Document Revised: 04/09/2020 Document Reviewed: 08/02/2018  [employer] Patient Education © 2020 [employer]

## 2021-01-01 ENCOUNTER — OFFICE VISIT (OUTPATIENT)
Dept: FAMILY MEDICINE CLINIC | Facility: CLINIC | Age: 81
End: 2021-01-01

## 2021-01-01 VITALS
TEMPERATURE: 98.7 F | WEIGHT: 92.2 LBS | DIASTOLIC BLOOD PRESSURE: 70 MMHG | SYSTOLIC BLOOD PRESSURE: 142 MMHG | BODY MASS INDEX: 18.59 KG/M2 | OXYGEN SATURATION: 98 % | HEART RATE: 83 BPM | HEIGHT: 59 IN

## 2021-01-01 DIAGNOSIS — I10 ESSENTIAL HYPERTENSION: ICD-10-CM

## 2021-01-01 DIAGNOSIS — G47.34 OXYGEN DESATURATION DURING SLEEP: Primary | ICD-10-CM

## 2021-01-01 DIAGNOSIS — J42 CHRONIC BRONCHITIS, UNSPECIFIED CHRONIC BRONCHITIS TYPE (HCC): ICD-10-CM

## 2021-01-01 PROCEDURE — 99212 OFFICE O/P EST SF 10 MIN: CPT | Performed by: NURSE PRACTITIONER

## 2021-01-01 RX ORDER — IPRATROPIUM/ALBUTEROL SULFATE 20-100 MCG
MIST INHALER (GRAM) INHALATION
Qty: 12 G | Refills: 11 | Status: SHIPPED | OUTPATIENT
Start: 2021-01-01

## 2021-01-01 RX ORDER — LORATADINE 10 MG/1
10 TABLET ORAL DAILY
Qty: 90 TABLET | Refills: 3 | Status: SHIPPED | OUTPATIENT
Start: 2021-01-01 | End: 2022-01-01 | Stop reason: HOSPADM

## 2021-01-01 RX ORDER — LISINOPRIL 20 MG/1
TABLET ORAL
Qty: 90 TABLET | Refills: 3 | Status: SHIPPED | OUTPATIENT
Start: 2021-01-01

## 2021-05-03 RX ORDER — ALBUTEROL SULFATE 90 UG/1
AEROSOL, METERED RESPIRATORY (INHALATION)
Qty: 54 G | Refills: 3 | Status: SHIPPED | OUTPATIENT
Start: 2021-05-03 | End: 2022-01-01

## 2021-05-12 ENCOUNTER — LAB (OUTPATIENT)
Dept: LAB | Facility: HOSPITAL | Age: 81
End: 2021-05-12

## 2021-05-12 ENCOUNTER — OFFICE VISIT (OUTPATIENT)
Dept: FAMILY MEDICINE CLINIC | Facility: CLINIC | Age: 81
End: 2021-05-12

## 2021-05-12 VITALS
BODY MASS INDEX: 19.35 KG/M2 | HEIGHT: 59 IN | SYSTOLIC BLOOD PRESSURE: 122 MMHG | TEMPERATURE: 97.5 F | DIASTOLIC BLOOD PRESSURE: 54 MMHG | WEIGHT: 96 LBS | HEART RATE: 107 BPM | OXYGEN SATURATION: 98 %

## 2021-05-12 DIAGNOSIS — N18.32 CHRONIC RENAL FAILURE, STAGE 3B (HCC): ICD-10-CM

## 2021-05-12 DIAGNOSIS — D64.9 ANEMIA, UNSPECIFIED TYPE: ICD-10-CM

## 2021-05-12 DIAGNOSIS — J44.9 CHRONIC OBSTRUCTIVE PULMONARY DISEASE, UNSPECIFIED COPD TYPE (HCC): Primary | ICD-10-CM

## 2021-05-12 DIAGNOSIS — F41.9 ANXIETY: ICD-10-CM

## 2021-05-12 PROCEDURE — 85027 COMPLETE CBC AUTOMATED: CPT | Performed by: FAMILY MEDICINE

## 2021-05-12 PROCEDURE — 99214 OFFICE O/P EST MOD 30 MIN: CPT | Performed by: FAMILY MEDICINE

## 2021-05-12 PROCEDURE — 82728 ASSAY OF FERRITIN: CPT | Performed by: FAMILY MEDICINE

## 2021-05-12 PROCEDURE — 82607 VITAMIN B-12: CPT | Performed by: FAMILY MEDICINE

## 2021-05-12 PROCEDURE — 80048 BASIC METABOLIC PNL TOTAL CA: CPT | Performed by: FAMILY MEDICINE

## 2021-05-12 PROCEDURE — 96372 THER/PROPH/DIAG INJ SC/IM: CPT | Performed by: FAMILY MEDICINE

## 2021-05-12 PROCEDURE — 82746 ASSAY OF FOLIC ACID SERUM: CPT | Performed by: FAMILY MEDICINE

## 2021-05-12 RX ORDER — TRIAMCINOLONE ACETONIDE 40 MG/ML
80 INJECTION, SUSPENSION INTRA-ARTICULAR; INTRAMUSCULAR ONCE
Status: COMPLETED | OUTPATIENT
Start: 2021-05-12 | End: 2021-05-12

## 2021-05-12 RX ORDER — FUROSEMIDE 40 MG/1
40 TABLET ORAL DAILY PRN
Qty: 90 TABLET | Refills: 3 | Status: SHIPPED | OUTPATIENT
Start: 2021-05-12 | End: 2022-01-01 | Stop reason: HOSPADM

## 2021-05-12 RX ORDER — IPRATROPIUM/ALBUTEROL SULFATE 20-100 MCG
1 MIST INHALER (GRAM) INHALATION
Qty: 12 G | Refills: 3 | Status: SHIPPED | OUTPATIENT
Start: 2021-05-12 | End: 2021-01-01

## 2021-05-12 RX ORDER — SERTRALINE HYDROCHLORIDE 25 MG/1
25 TABLET, FILM COATED ORAL DAILY
Qty: 90 TABLET | Refills: 3 | Status: SHIPPED | OUTPATIENT
Start: 2021-05-12 | End: 2022-01-01 | Stop reason: SDUPTHER

## 2021-05-12 RX ADMIN — TRIAMCINOLONE ACETONIDE 80 MG: 40 INJECTION, SUSPENSION INTRA-ARTICULAR; INTRAMUSCULAR at 14:08

## 2021-05-12 NOTE — PROGRESS NOTES
" Subjective   Tamara Singh is a 80 y.o. female.     Chief Complaint   Patient presents with   • 02 recert             History of Present Illness     FACE TO FACE OXYGEN RECERTIFICATION    o2 during sleep at 3 lpm.    She is having difficulty walking today due to her breathing.  She has to rest between talking.   Needs refills on meds.  She has lost weight, she says this has helped her breathing.   o2 sat room air at rest is 98%.   Walking with a cane  Also  Mild anemia that needs to be watched and chronic renal failure as well.     Review of Systems   Constitutional: Negative for chills, fatigue and fever.   HENT: Negative for congestion, ear discharge, ear pain, facial swelling, hearing loss, postnasal drip, rhinorrhea, sinus pressure, sore throat, trouble swallowing and voice change.    Eyes: Negative for discharge, redness and visual disturbance.   Respiratory: Positive for shortness of breath and wheezing. Negative for cough and chest tightness.    Cardiovascular: Negative for chest pain and palpitations.   Gastrointestinal: Negative for abdominal pain, blood in stool, constipation, diarrhea, nausea and vomiting.   Endocrine: Negative for polydipsia and polyuria.   Genitourinary: Negative for dysuria, flank pain, hematuria and urgency.   Musculoskeletal: Negative for arthralgias, back pain, joint swelling and myalgias.   Skin: Negative for rash.   Neurological: Negative for dizziness, weakness, numbness and headaches.   Hematological: Negative for adenopathy.   Psychiatric/Behavioral: Negative for confusion and sleep disturbance. The patient is not nervous/anxious.            /54 (BP Location: Left arm, Patient Position: Sitting, Cuff Size: Adult)   Pulse 107   Temp 97.5 °F (36.4 °C) (Infrared)   Ht 149.9 cm (59.02\")   Wt 43.5 kg (96 lb)   SpO2 98%   BMI 19.38 kg/m²       Objective     Physical Exam  Vitals and nursing note reviewed.   Constitutional:       Appearance: Normal appearance. She " is well-developed.   HENT:      Head: Normocephalic and atraumatic.      Right Ear: External ear normal.      Left Ear: External ear normal.      Nose: Nose normal.   Eyes:      Extraocular Movements: Extraocular movements intact.      Conjunctiva/sclera: Conjunctivae normal.      Pupils: Pupils are equal, round, and reactive to light.   Pulmonary:      Effort: Pulmonary effort is normal.      Breath sounds: Wheezing present.      Comments: Diminished   Musculoskeletal:         General: Normal range of motion.      Cervical back: Normal range of motion.   Neurological:      General: No focal deficit present.      Mental Status: She is alert and oriented to person, place, and time.   Psychiatric:         Behavior: Behavior normal.         Thought Content: Thought content normal.         Judgment: Judgment normal.             PAST MEDICAL HISTORY     Past Medical History:   Diagnosis Date   • Acute bronchitis    • Artificial lens present     in position    • Backache    • Borderline glaucoma    • Borderline glaucoma    • Chronic obstructive lung disease (CMS/HCC)    • Chronic obstructive lung disease (CMS/HCC)    • Common cold    • Dysphagia    • Dyspnea    • Edema    • Encounter for immunization    • Epigastric pain    • Essential hypertension    • External hemorrhoids without complication    • Fever    • Heartburn    • Low back pain    • Malignant neoplasm (CMS/HCC)     Malignant neoplasm of other specified part of esophagus   • 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 Biopsy taken. Normal stomach. Normal duodenum.)   2014 GURPREET BHAT    • ENDOSCOPY  2014    EGD w/ tube 00470 (Esophagitis seen. Biopsy taken. No evidence of esophageal ca. Gastritis in stomach. Biopsy taken. Normal duodenum.)   • ENDOSCOPY  10/02/2013    EGD w/ tube 98573 (Normal esopahgus, stoamch, & duodenum. No evidence of tumor. Biopsy taken.)   • ENDOSCOPY  2013    EGD w/ tube [id number] (Tumor in distal 3rd of esophagus. Biopsy taken. Lesion runs from 28 cms to 34 cms. Normal stomach & duodenum.)   • INJECTION OF MEDICATION  2016    Kenalog (5)      • OTHER SURGICAL HISTORY  12/10/2014    Drain/Inject Major Joint  (Shoulder joint pain)    • OTHER SURGICAL HISTORY      OCT DISC NFL 26177 (Primary open angle glaucoma (2): 2016, 2015   • OTHER SURGICAL HISTORY  2016    OPTIC NERVE HEAD EVAL PERFORMED  (Primary open angle glaucoma)       SOCIAL HISTORY     Social History     Socioeconomic History   • Marital status:      Spouse name: Not on file   • Number of children: Not on file   • Years of education: Not on file   • Highest education level: Not on file   Tobacco Use   • Smoking status: Former Smoker     Packs/day: 1.00     Years: 59.00     Pack years: 59.00     Types: Cigarettes     Start date: 1955     Quit date: 2014     Years since quittin.3   • Smokeless tobacco: Never Used   Substance and Sexual Activity   • Alcohol use: No   • Drug use: No   • Sexual activity: Defer      ALLERGIES   Patient has no known allergies.   MEDICATIONS     Current Outpatient Medications   Medication Sig Dispense Refill   • albuterol sulfate  (90 Base) MCG/ACT inhaler INHALE 2 PUFFS BY MOUTH EVERY 6 HOURS AS NEEDED FOR WHEEZING 54 g 3   • Cholecalciferol 54847 UNITS capsule Take 1 capsule by mouth daily.     • clotrimazole (LOTRIMIN) 1 % cream Apply   topically to the appropriate area as directed 2 (Two) Times a Day. 15 g 11   • furosemide (LASIX) 40 MG tablet Take 1 tablet by mouth Daily As Needed (SWELLING). 90 tablet 3   • ipratropium-albuterol (Combivent Respimat)  MCG/ACT inhaler Inhale 1 puff 4 (Four) Times a Day. 12 g 3   • ipratropium-albuterol (DUO-NEB) 0.5-2.5 mg/3 ml nebulizer Take 3 mL by nebulization 4 (Four) Times a Day. 360 mL 12   • lisinopril (PRINIVIL,ZESTRIL) 20 MG tablet Take 1 tablet by mouth Daily. 90 tablet 3   • loratadine (Claritin) 10 MG tablet Take 1 tablet by mouth Daily. 90 tablet 3   • sertraline (ZOLOFT) 25 MG tablet Take 1 tablet by mouth Daily. 90 tablet 3   • latanoprost (XALATAN) 0.005 % ophthalmic solution INSTILL 1 DROP INTO EACH EYE AT BEDTIME       No current facility-administered medications for this visit.        The following portions of the patient's history were reviewed and updated as appropriate: allergies, current medications, past family history, past medical history, past social history, past surgical history and problem list.        Assessment/Plan   Diagnoses and all orders for this visit:    1. Chronic obstructive pulmonary disease, unspecified COPD type (CMS/HCC) (Primary)  -     triamcinolone acetonide (KENALOG-40) injection 80 mg    2. Anemia, unspecified type  -     CBC (No Diff)  -     Vitamin B12  -     Folate  -     Ferritin    3. Chronic renal failure, stage 3b (CMS/HCC)  -     Basic Metabolic Panel    4. Anxiety    Other orders  -     sertraline (ZOLOFT) 25 MG tablet; Take 1 tablet by mouth Daily.  Dispense: 90 tablet; Refill: 3  -     ipratropium-albuterol (Combivent Respimat)  MCG/ACT inhaler; Inhale 1 puff 4 (Four) Times a Day.  Dispense: 12 g; Refill: 3  -     furosemide (LASIX) 40 MG tablet; Take 1 tablet by mouth Daily As Needed (SWELLING).  Dispense: 90 tablet; Refill: 3        Copd given kenalo    Recheck anemia    Recheck renal function    Anxiety stable    SHE CONTINUES TO NEED  OXYGEN.  IT HELPS HER.                 No follow-ups on file.                  This document has been electronically signed by Jean Pierre Scanlon MD on May 12, 2021 13:55 CDT

## 2021-05-13 LAB
ANION GAP SERPL CALCULATED.3IONS-SCNC: 15.5 MMOL/L (ref 5–15)
BUN SERPL-MCNC: 41 MG/DL (ref 8–23)
BUN/CREAT SERPL: 25.5 (ref 7–25)
CALCIUM SPEC-SCNC: 9.8 MG/DL (ref 8.6–10.5)
CHLORIDE SERPL-SCNC: 97 MMOL/L (ref 98–107)
CO2 SERPL-SCNC: 25.5 MMOL/L (ref 22–29)
CREAT SERPL-MCNC: 1.61 MG/DL (ref 0.57–1)
DEPRECATED RDW RBC AUTO: 45.4 FL (ref 37–54)
ERYTHROCYTE [DISTWIDTH] IN BLOOD BY AUTOMATED COUNT: 13.4 % (ref 12.3–15.4)
FERRITIN SERPL-MCNC: 240 NG/ML (ref 13–150)
FOLATE SERPL-MCNC: 10.9 NG/ML (ref 4.78–24.2)
GFR SERPL CREATININE-BSD FRML MDRD: 31 ML/MIN/1.73
GLUCOSE SERPL-MCNC: 119 MG/DL (ref 65–99)
HCT VFR BLD AUTO: 33.9 % (ref 34–46.6)
HGB BLD-MCNC: 11.3 G/DL (ref 12–15.9)
MCH RBC QN AUTO: 31 PG (ref 26.6–33)
MCHC RBC AUTO-ENTMCNC: 33.3 G/DL (ref 31.5–35.7)
MCV RBC AUTO: 93.1 FL (ref 79–97)
PLATELET # BLD AUTO: 271 10*3/MM3 (ref 140–450)
PMV BLD AUTO: 10.2 FL (ref 6–12)
POTASSIUM SERPL-SCNC: 4.5 MMOL/L (ref 3.5–5.2)
RBC # BLD AUTO: 3.64 10*6/MM3 (ref 3.77–5.28)
SODIUM SERPL-SCNC: 138 MMOL/L (ref 136–145)
VIT B12 BLD-MCNC: 491 PG/ML (ref 211–946)
WBC # BLD AUTO: 4.61 10*3/MM3 (ref 3.4–10.8)

## 2021-08-02 NOTE — PROGRESS NOTES
"Subjective   Tamara Singh is a 81 y.o. female. Oxygen re certification     History of Present Illness   Patient presents today for oxygen re certification. She wears supplemental oxygen nightly and sometimes during the day if she \"has a cold\". Currently she says she wears 3 L nasal cannula nightly. Pt says she feels much better since using the oxygen. Pertinent history COPD. She is former smoker.    The following portions of the patient's history were reviewed and updated as appropriate: allergies, current medications, past family history, past medical history, past social history, past surgical history, and problem list.    Review of Systems   Constitutional: Negative for chills, fatigue and fever.   HENT: Negative for congestion, ear pain, rhinorrhea and sore throat.    Eyes: Negative for blurred vision, double vision and visual disturbance.   Respiratory: Positive for shortness of breath. Negative for cough, chest tightness and wheezing.    Cardiovascular: Negative for chest pain, palpitations and leg swelling.   Gastrointestinal: Negative for abdominal pain, diarrhea, nausea and vomiting.   Endocrine: Negative for cold intolerance and heat intolerance.   Genitourinary: Negative for difficulty urinating, dysuria, frequency and hematuria.   Musculoskeletal: Negative for arthralgias, back pain, neck pain and neck stiffness.   Skin: Negative for dry skin, pallor, rash, skin lesions and bruise.   Allergic/Immunologic: Negative for environmental allergies, food allergies and immunocompromised state.   Neurological: Negative for dizziness, syncope, weakness, light-headedness, headache and confusion.   Hematological: Negative for adenopathy. Does not bruise/bleed easily.   Psychiatric/Behavioral: Negative for self-injury, sleep disturbance, suicidal ideas, depressed mood and stress. The patient is not nervous/anxious.        Vitals:    08/02/21 0900   BP: 142/70   Pulse: 83   Temp: 98.7 °F (37.1 °C)   SpO2: 98% "     Body mass index is 18.61 kg/m².    Objective   Physical Exam  Vitals and nursing note reviewed.   Constitutional:       Appearance: She is well-developed.   HENT:      Head: Normocephalic.   Eyes:      Conjunctiva/sclera: Conjunctivae normal.   Musculoskeletal:         General: Normal range of motion.      Cervical back: Full passive range of motion without pain, normal range of motion and neck supple.   Skin:     General: Skin is warm and dry.   Neurological:      Mental Status: She is alert and oriented to person, place, and time.      Coordination: Coordination normal.      Gait: Gait normal.   Psychiatric:         Speech: Speech normal.         Behavior: Behavior normal. Behavior is cooperative.         Thought Content: Thought content normal.         Judgment: Judgment normal.           Assessment/Plan   Diagnoses and all orders for this visit:    1. Oxygen desaturation during sleep (Primary)    2. Chronic bronchitis, unspecified chronic bronchitis type (CMS/HCC)    Pt needs to continue using oxygen via nasal cannula at 3 L nightly and PRN.  If symptoms do not improve or worsen, patient was instructed to return to clinic for further evaluation.         This document has been electronically signed by LIBBY Palacios on  August 2, 2021 09:32 CDT

## 2022-01-01 ENCOUNTER — HOME CARE VISIT (OUTPATIENT)
Dept: HOME HEALTH SERVICES | Facility: CLINIC | Age: 82
End: 2022-01-01

## 2022-01-01 ENCOUNTER — HOME HEALTH ADMISSION (OUTPATIENT)
Dept: HOME HEALTH SERVICES | Facility: HOME HEALTHCARE | Age: 82
End: 2022-01-01

## 2022-01-01 ENCOUNTER — HOME CARE VISIT (OUTPATIENT)
Dept: HOME HEALTH SERVICES | Facility: HOME HEALTHCARE | Age: 82
End: 2022-01-01

## 2022-01-01 ENCOUNTER — TRANSITIONAL CARE MANAGEMENT TELEPHONE ENCOUNTER (OUTPATIENT)
Dept: CALL CENTER | Facility: HOSPITAL | Age: 82
End: 2022-01-01

## 2022-01-01 ENCOUNTER — APPOINTMENT (OUTPATIENT)
Dept: GENERAL RADIOLOGY | Facility: HOSPITAL | Age: 82
End: 2022-01-01

## 2022-01-01 ENCOUNTER — LAB (OUTPATIENT)
Dept: LAB | Facility: HOSPITAL | Age: 82
End: 2022-01-01

## 2022-01-01 ENCOUNTER — APPOINTMENT (OUTPATIENT)
Dept: CARDIOLOGY | Facility: HOSPITAL | Age: 82
End: 2022-01-01

## 2022-01-01 ENCOUNTER — READMISSION MANAGEMENT (OUTPATIENT)
Dept: CALL CENTER | Facility: HOSPITAL | Age: 82
End: 2022-01-01

## 2022-01-01 ENCOUNTER — APPOINTMENT (OUTPATIENT)
Dept: CT IMAGING | Facility: HOSPITAL | Age: 82
End: 2022-01-01

## 2022-01-01 ENCOUNTER — HOSPICE ADMISSION (OUTPATIENT)
Dept: HOSPICE | Facility: HOSPICE | Age: 82
End: 2022-01-01

## 2022-01-01 ENCOUNTER — OFFICE VISIT (OUTPATIENT)
Dept: FAMILY MEDICINE CLINIC | Facility: CLINIC | Age: 82
End: 2022-01-01

## 2022-01-01 ENCOUNTER — TELEPHONE (OUTPATIENT)
Dept: FAMILY MEDICINE CLINIC | Facility: CLINIC | Age: 82
End: 2022-01-01

## 2022-01-01 ENCOUNTER — HOSPITAL ENCOUNTER (OUTPATIENT)
Facility: HOSPITAL | Age: 82
Setting detail: OBSERVATION
Discharge: HOME HEALTH | End: 2022-06-23
Attending: STUDENT IN AN ORGANIZED HEALTH CARE EDUCATION/TRAINING PROGRAM | Admitting: INTERNAL MEDICINE

## 2022-01-01 ENCOUNTER — APPOINTMENT (OUTPATIENT)
Dept: ULTRASOUND IMAGING | Facility: HOSPITAL | Age: 82
End: 2022-01-01

## 2022-01-01 ENCOUNTER — HOSPITAL ENCOUNTER (INPATIENT)
Facility: HOSPITAL | Age: 82
LOS: 6 days | Discharge: HOME | End: 2022-06-06
Attending: STUDENT IN AN ORGANIZED HEALTH CARE EDUCATION/TRAINING PROGRAM | Admitting: INTERNAL MEDICINE

## 2022-01-01 ENCOUNTER — TRANSCRIBE ORDERS (OUTPATIENT)
Dept: HOME HEALTH SERVICES | Facility: CLINIC | Age: 82
End: 2022-01-01

## 2022-01-01 VITALS
OXYGEN SATURATION: 99 % | HEIGHT: 58 IN | SYSTOLIC BLOOD PRESSURE: 149 MMHG | WEIGHT: 96 LBS | DIASTOLIC BLOOD PRESSURE: 69 MMHG | RESPIRATION RATE: 22 BRPM | HEART RATE: 90 BPM | BODY MASS INDEX: 20.15 KG/M2

## 2022-01-01 VITALS
RESPIRATION RATE: 20 BRPM | OXYGEN SATURATION: 99 % | SYSTOLIC BLOOD PRESSURE: 132 MMHG | TEMPERATURE: 98.1 F | DIASTOLIC BLOOD PRESSURE: 68 MMHG | HEART RATE: 70 BPM

## 2022-01-01 VITALS
SYSTOLIC BLOOD PRESSURE: 132 MMHG | RESPIRATION RATE: 20 BRPM | WEIGHT: 82 LBS | HEART RATE: 86 BPM | DIASTOLIC BLOOD PRESSURE: 62 MMHG | TEMPERATURE: 98.1 F | BODY MASS INDEX: 16.56 KG/M2 | OXYGEN SATURATION: 100 %

## 2022-01-01 VITALS
HEART RATE: 68 BPM | SYSTOLIC BLOOD PRESSURE: 154 MMHG | RESPIRATION RATE: 24 BRPM | OXYGEN SATURATION: 98 % | TEMPERATURE: 98.4 F | WEIGHT: 83.8 LBS | BODY MASS INDEX: 16.93 KG/M2 | DIASTOLIC BLOOD PRESSURE: 80 MMHG

## 2022-01-01 VITALS
HEART RATE: 104 BPM | HEIGHT: 58 IN | DIASTOLIC BLOOD PRESSURE: 54 MMHG | WEIGHT: 97.2 LBS | TEMPERATURE: 98 F | OXYGEN SATURATION: 99 % | BODY MASS INDEX: 20.4 KG/M2 | RESPIRATION RATE: 18 BRPM | SYSTOLIC BLOOD PRESSURE: 112 MMHG

## 2022-01-01 VITALS
TEMPERATURE: 97.5 F | HEART RATE: 87 BPM | SYSTOLIC BLOOD PRESSURE: 163 MMHG | OXYGEN SATURATION: 86 % | DIASTOLIC BLOOD PRESSURE: 71 MMHG

## 2022-01-01 VITALS
OXYGEN SATURATION: 98 % | HEART RATE: 88 BPM | TEMPERATURE: 97.4 F | DIASTOLIC BLOOD PRESSURE: 68 MMHG | RESPIRATION RATE: 20 BRPM | SYSTOLIC BLOOD PRESSURE: 150 MMHG

## 2022-01-01 VITALS
OXYGEN SATURATION: 92 % | RESPIRATION RATE: 18 BRPM | HEIGHT: 59 IN | SYSTOLIC BLOOD PRESSURE: 146 MMHG | BODY MASS INDEX: 19.81 KG/M2 | WEIGHT: 98.25 LBS | DIASTOLIC BLOOD PRESSURE: 88 MMHG | TEMPERATURE: 97 F | HEART RATE: 81 BPM

## 2022-01-01 VITALS
HEIGHT: 59 IN | HEART RATE: 107 BPM | TEMPERATURE: 99.5 F | WEIGHT: 87.5 LBS | BODY MASS INDEX: 17.64 KG/M2 | OXYGEN SATURATION: 100 % | DIASTOLIC BLOOD PRESSURE: 79 MMHG | SYSTOLIC BLOOD PRESSURE: 134 MMHG

## 2022-01-01 VITALS
OXYGEN SATURATION: 99 % | RESPIRATION RATE: 20 BRPM | TEMPERATURE: 99.2 F | HEART RATE: 90 BPM | SYSTOLIC BLOOD PRESSURE: 136 MMHG | DIASTOLIC BLOOD PRESSURE: 72 MMHG

## 2022-01-01 DIAGNOSIS — Z09 FOLLOW-UP EXAM: Primary | ICD-10-CM

## 2022-01-01 DIAGNOSIS — R09.89 CHEST CONGESTION: ICD-10-CM

## 2022-01-01 DIAGNOSIS — J96.21 ACUTE ON CHRONIC RESPIRATORY FAILURE WITH HYPOXIA AND HYPERCAPNIA: ICD-10-CM

## 2022-01-01 DIAGNOSIS — Z09 HOSPITAL DISCHARGE FOLLOW-UP: Primary | ICD-10-CM

## 2022-01-01 DIAGNOSIS — J96.22 ACUTE ON CHRONIC RESPIRATORY FAILURE WITH HYPOXIA AND HYPERCAPNIA: Primary | ICD-10-CM

## 2022-01-01 DIAGNOSIS — J96.22 ACUTE ON CHRONIC RESPIRATORY FAILURE WITH HYPOXIA AND HYPERCAPNIA: ICD-10-CM

## 2022-01-01 DIAGNOSIS — R06.02 SHORTNESS OF BREATH: ICD-10-CM

## 2022-01-01 DIAGNOSIS — R06.02 SOB (SHORTNESS OF BREATH): Primary | ICD-10-CM

## 2022-01-01 DIAGNOSIS — J96.21 ACUTE ON CHRONIC RESPIRATORY FAILURE WITH HYPOXIA AND HYPERCAPNIA: Primary | ICD-10-CM

## 2022-01-01 DIAGNOSIS — R06.2 WHEEZING: ICD-10-CM

## 2022-01-01 DIAGNOSIS — J44.1 COPD WITH EXACERBATION: Primary | ICD-10-CM

## 2022-01-01 DIAGNOSIS — I50.22 CHRONIC SYSTOLIC CHF (CONGESTIVE HEART FAILURE): Primary | ICD-10-CM

## 2022-01-01 DIAGNOSIS — K59.00 CONSTIPATION, UNSPECIFIED CONSTIPATION TYPE: ICD-10-CM

## 2022-01-01 DIAGNOSIS — R34 DECREASED URINATION: ICD-10-CM

## 2022-01-01 DIAGNOSIS — J44.9 CHRONIC OBSTRUCTIVE PULMONARY DISEASE, UNSPECIFIED COPD TYPE: ICD-10-CM

## 2022-01-01 DIAGNOSIS — R06.02 SOB (SHORTNESS OF BREATH): ICD-10-CM

## 2022-01-01 DIAGNOSIS — R13.10 DYSPHAGIA, UNSPECIFIED TYPE: ICD-10-CM

## 2022-01-01 DIAGNOSIS — Z74.09 IMPAIRED FUNCTIONAL MOBILITY, BALANCE, GAIT, AND ENDURANCE: ICD-10-CM

## 2022-01-01 DIAGNOSIS — J18.9 PNEUMONIA OF LEFT LOWER LOBE DUE TO INFECTIOUS ORGANISM: Primary | ICD-10-CM

## 2022-01-01 DIAGNOSIS — I50.22 CHRONIC SYSTOLIC CHF (CONGESTIVE HEART FAILURE): ICD-10-CM

## 2022-01-01 DIAGNOSIS — Z78.9 IMPAIRED MOBILITY AND ACTIVITIES OF DAILY LIVING: ICD-10-CM

## 2022-01-01 DIAGNOSIS — Z74.09 IMPAIRED MOBILITY AND ACTIVITIES OF DAILY LIVING: ICD-10-CM

## 2022-01-01 LAB
ALBUMIN SERPL-MCNC: 3.4 G/DL (ref 3.5–5.2)
ALBUMIN SERPL-MCNC: 3.4 G/DL (ref 3.5–5.2)
ALBUMIN/GLOB SERPL: 0.9 G/DL
ALBUMIN/GLOB SERPL: 1.3 G/DL
ALP SERPL-CCNC: 149 U/L (ref 39–117)
ALP SERPL-CCNC: 206 U/L (ref 39–117)
ALT SERPL W P-5'-P-CCNC: 28 U/L (ref 1–33)
ALT SERPL W P-5'-P-CCNC: 56 U/L (ref 1–33)
ANION GAP SERPL CALCULATED.3IONS-SCNC: 10 MMOL/L (ref 5–15)
ANION GAP SERPL CALCULATED.3IONS-SCNC: 11 MMOL/L (ref 5–15)
ANION GAP SERPL CALCULATED.3IONS-SCNC: 11.1 MMOL/L (ref 5–15)
ANION GAP SERPL CALCULATED.3IONS-SCNC: 12 MMOL/L (ref 5–15)
ANION GAP SERPL CALCULATED.3IONS-SCNC: 13 MMOL/L (ref 5–15)
ANION GAP SERPL CALCULATED.3IONS-SCNC: 15 MMOL/L (ref 5–15)
ANION GAP SERPL CALCULATED.3IONS-SCNC: 17 MMOL/L (ref 5–15)
ANION GAP SERPL CALCULATED.3IONS-SCNC: 4 MMOL/L (ref 5–15)
ANION GAP SERPL CALCULATED.3IONS-SCNC: 6 MMOL/L (ref 5–15)
ANION GAP SERPL CALCULATED.3IONS-SCNC: 7 MMOL/L (ref 5–15)
ANION GAP SERPL CALCULATED.3IONS-SCNC: 9 MMOL/L (ref 5–15)
ARTERIAL PATENCY WRIST A: ABNORMAL
AST SERPL-CCNC: 44 U/L (ref 1–32)
AST SERPL-CCNC: 61 U/L (ref 1–32)
ATMOSPHERIC PRESS: 744 MMHG
ATMOSPHERIC PRESS: 746 MMHG
ATMOSPHERIC PRESS: 751 MMHG
ATMOSPHERIC PRESS: 752 MMHG
BACTERIA SPEC AEROBE CULT: NORMAL
BACTERIA SPEC AEROBE CULT: NORMAL
BACTERIA SPEC RESP CULT: NORMAL
BASE EXCESS BLDA CALC-SCNC: -0.5 MMOL/L (ref 0–2)
BASE EXCESS BLDA CALC-SCNC: -3.9 MMOL/L (ref 0–2)
BASE EXCESS BLDA CALC-SCNC: 15.5 MMOL/L (ref 0–2)
BASE EXCESS BLDA CALC-SCNC: 9.5 MMOL/L (ref 0–2)
BASOPHILS # BLD AUTO: 0 10*3/MM3 (ref 0–0.2)
BASOPHILS # BLD AUTO: 0.01 10*3/MM3 (ref 0–0.2)
BASOPHILS # BLD AUTO: 0.02 10*3/MM3 (ref 0–0.2)
BASOPHILS # BLD AUTO: 0.03 10*3/MM3 (ref 0–0.2)
BASOPHILS # BLD AUTO: 0.03 10*3/MM3 (ref 0–0.2)
BASOPHILS # BLD AUTO: 0.04 10*3/MM3 (ref 0–0.2)
BASOPHILS # BLD AUTO: 0.07 10*3/MM3 (ref 0–0.2)
BASOPHILS NFR BLD AUTO: 0 % (ref 0–1.5)
BASOPHILS NFR BLD AUTO: 0.1 % (ref 0–1.5)
BASOPHILS NFR BLD AUTO: 0.2 % (ref 0–1.5)
BASOPHILS NFR BLD AUTO: 0.3 % (ref 0–1.5)
BASOPHILS NFR BLD AUTO: 0.4 % (ref 0–1.5)
BASOPHILS NFR BLD AUTO: 0.4 % (ref 0–1.5)
BASOPHILS NFR BLD AUTO: 0.5 % (ref 0–1.5)
BDY SITE: ABNORMAL
BH CV ECHO MEAS - ACS: 1.63 CM
BH CV ECHO MEAS - AO MAX PG: 4.3 MMHG
BH CV ECHO MEAS - AO MEAN PG: 2.23 MMHG
BH CV ECHO MEAS - AO ROOT DIAM: 3.1 CM
BH CV ECHO MEAS - AO V2 MAX: 103.6 CM/SEC
BH CV ECHO MEAS - AO V2 VTI: 14.2 CM
BH CV ECHO MEAS - AVA(I,D): 1.93 CM2
BH CV ECHO MEAS - EDV(CUBED): 72.4 ML
BH CV ECHO MEAS - EDV(MOD-SP2): 50.4 ML
BH CV ECHO MEAS - EDV(MOD-SP4): 51.6 ML
BH CV ECHO MEAS - EF(MOD-BP): 38.8 %
BH CV ECHO MEAS - EF(MOD-SP2): 39.1 %
BH CV ECHO MEAS - EF(MOD-SP4): 37 %
BH CV ECHO MEAS - ESV(CUBED): 43.3 ML
BH CV ECHO MEAS - ESV(MOD-SP2): 30.7 ML
BH CV ECHO MEAS - ESV(MOD-SP4): 32.5 ML
BH CV ECHO MEAS - FS: 15.8 %
BH CV ECHO MEAS - IVS/LVPW: 0.96 CM
BH CV ECHO MEAS - IVSD: 0.78 CM
BH CV ECHO MEAS - LA DIMENSION: 3.3 CM
BH CV ECHO MEAS - LAT PEAK E' VEL: 9.2 CM/SEC
BH CV ECHO MEAS - LV DIASTOLIC VOL/BSA (35-75): 40.8 CM2
BH CV ECHO MEAS - LV MASS(C)D: 99 GRAMS
BH CV ECHO MEAS - LV MAX PG: 2.9 MMHG
BH CV ECHO MEAS - LV MEAN PG: 1.32 MMHG
BH CV ECHO MEAS - LV SYSTOLIC VOL/BSA (12-30): 25.7 CM2
BH CV ECHO MEAS - LV V1 MAX: 85.3 CM/SEC
BH CV ECHO MEAS - LV V1 VTI: 10.6 CM
BH CV ECHO MEAS - LVIDD: 4.2 CM
BH CV ECHO MEAS - LVIDS: 3.5 CM
BH CV ECHO MEAS - LVOT AREA: 2.6 CM2
BH CV ECHO MEAS - LVOT DIAM: 1.81 CM
BH CV ECHO MEAS - LVPWD: 0.81 CM
BH CV ECHO MEAS - MED PEAK E' VEL: 5 CM/SEC
BH CV ECHO MEAS - MR MAX PG: 39.1 MMHG
BH CV ECHO MEAS - MR MAX VEL: 312.7 CM/SEC
BH CV ECHO MEAS - MV A MAX VEL: 100.3 CM/SEC
BH CV ECHO MEAS - MV DEC SLOPE: 526.5 CM/SEC2
BH CV ECHO MEAS - MV E MAX VEL: 107 CM/SEC
BH CV ECHO MEAS - MV E/A: 1.07
BH CV ECHO MEAS - MV MAX PG: 6.7 MMHG
BH CV ECHO MEAS - MV MEAN PG: 2.9 MMHG
BH CV ECHO MEAS - MV P1/2T: 71.5 MSEC
BH CV ECHO MEAS - MV V2 VTI: 27.3 CM
BH CV ECHO MEAS - MVA(P1/2T): 3.1 CM2
BH CV ECHO MEAS - MVA(VTI): 1.01 CM2
BH CV ECHO MEAS - PA V2 MAX: 88.5 CM/SEC
BH CV ECHO MEAS - RAP SYSTOLE: 10 MMHG
BH CV ECHO MEAS - RVDD: 2.6 CM
BH CV ECHO MEAS - RVSP: 51.2 MMHG
BH CV ECHO MEAS - SI(MOD-SP2): 15.6 ML/M2
BH CV ECHO MEAS - SI(MOD-SP4): 15.1 ML/M2
BH CV ECHO MEAS - SV(LVOT): 27.5 ML
BH CV ECHO MEAS - SV(MOD-SP2): 19.7 ML
BH CV ECHO MEAS - SV(MOD-SP4): 19.1 ML
BH CV ECHO MEAS - TR MAX PG: 41.2 MMHG
BH CV ECHO MEAS - TR MAX VEL: 320.8 CM/SEC
BH CV ECHO MEASUREMENTS AVERAGE E/E' RATIO: 15.07
BILIRUB SERPL-MCNC: 0.2 MG/DL (ref 0–1.2)
BILIRUB SERPL-MCNC: 0.4 MG/DL (ref 0–1.2)
BUN SERPL-MCNC: 21 MG/DL (ref 8–23)
BUN SERPL-MCNC: 29 MG/DL (ref 8–23)
BUN SERPL-MCNC: 29 MG/DL (ref 8–23)
BUN SERPL-MCNC: 30 MG/DL (ref 8–23)
BUN SERPL-MCNC: 31 MG/DL (ref 8–23)
BUN SERPL-MCNC: 34 MG/DL (ref 8–23)
BUN SERPL-MCNC: 34 MG/DL (ref 8–23)
BUN SERPL-MCNC: 36 MG/DL (ref 8–23)
BUN SERPL-MCNC: 37 MG/DL (ref 8–23)
BUN SERPL-MCNC: 45 MG/DL (ref 8–23)
BUN SERPL-MCNC: 58 MG/DL (ref 8–23)
BUN SERPL-MCNC: 67 MG/DL (ref 8–23)
BUN SERPL-MCNC: 74 MG/DL (ref 8–23)
BUN SERPL-MCNC: 75 MG/DL (ref 8–23)
BUN SERPL-MCNC: 77 MG/DL (ref 8–23)
BUN SERPL-MCNC: 78 MG/DL (ref 8–23)
BUN/CREAT SERPL: 21.4 (ref 7–25)
BUN/CREAT SERPL: 23.3 (ref 7–25)
BUN/CREAT SERPL: 31.3 (ref 7–25)
BUN/CREAT SERPL: 31.3 (ref 7–25)
BUN/CREAT SERPL: 31.8 (ref 7–25)
BUN/CREAT SERPL: 32.2 (ref 7–25)
BUN/CREAT SERPL: 33.3 (ref 7–25)
BUN/CREAT SERPL: 33.9 (ref 7–25)
BUN/CREAT SERPL: 34.1 (ref 7–25)
BUN/CREAT SERPL: 38.1 (ref 7–25)
BUN/CREAT SERPL: 40.4 (ref 7–25)
BUN/CREAT SERPL: 42.5 (ref 7–25)
BUN/CREAT SERPL: 43 (ref 7–25)
BUN/CREAT SERPL: 44.1 (ref 7–25)
BUN/CREAT SERPL: 46.9 (ref 7–25)
BUN/CREAT SERPL: 47.9 (ref 7–25)
CALCIUM SPEC-SCNC: 10 MG/DL (ref 8.6–10.5)
CALCIUM SPEC-SCNC: 8.1 MG/DL (ref 8.6–10.5)
CALCIUM SPEC-SCNC: 8.4 MG/DL (ref 8.6–10.5)
CALCIUM SPEC-SCNC: 8.7 MG/DL (ref 8.6–10.5)
CALCIUM SPEC-SCNC: 8.7 MG/DL (ref 8.6–10.5)
CALCIUM SPEC-SCNC: 8.8 MG/DL (ref 8.6–10.5)
CALCIUM SPEC-SCNC: 8.9 MG/DL (ref 8.6–10.5)
CALCIUM SPEC-SCNC: 8.9 MG/DL (ref 8.6–10.5)
CALCIUM SPEC-SCNC: 9 MG/DL (ref 8.6–10.5)
CALCIUM SPEC-SCNC: 9.1 MG/DL (ref 8.6–10.5)
CALCIUM SPEC-SCNC: 9.2 MG/DL (ref 8.6–10.5)
CALCIUM SPEC-SCNC: 9.3 MG/DL (ref 8.6–10.5)
CALCIUM SPEC-SCNC: 9.3 MG/DL (ref 8.6–10.5)
CALCIUM SPEC-SCNC: 9.4 MG/DL (ref 8.6–10.5)
CALCIUM SPEC-SCNC: 9.5 MG/DL (ref 8.6–10.5)
CALCIUM SPEC-SCNC: 9.7 MG/DL (ref 8.6–10.5)
CHLORIDE SERPL-SCNC: 101 MMOL/L (ref 98–107)
CHLORIDE SERPL-SCNC: 102 MMOL/L (ref 98–107)
CHLORIDE SERPL-SCNC: 102 MMOL/L (ref 98–107)
CHLORIDE SERPL-SCNC: 103 MMOL/L (ref 98–107)
CHLORIDE SERPL-SCNC: 105 MMOL/L (ref 98–107)
CHLORIDE SERPL-SCNC: 107 MMOL/L (ref 98–107)
CHLORIDE SERPL-SCNC: 93 MMOL/L (ref 98–107)
CHLORIDE SERPL-SCNC: 96 MMOL/L (ref 98–107)
CHLORIDE SERPL-SCNC: 97 MMOL/L (ref 98–107)
CHLORIDE SERPL-SCNC: 97 MMOL/L (ref 98–107)
CHLORIDE SERPL-SCNC: 99 MMOL/L (ref 98–107)
CHLORIDE SERPL-SCNC: 99 MMOL/L (ref 98–107)
CO2 SERPL-SCNC: 20 MMOL/L (ref 22–29)
CO2 SERPL-SCNC: 21 MMOL/L (ref 22–29)
CO2 SERPL-SCNC: 22 MMOL/L (ref 22–29)
CO2 SERPL-SCNC: 23 MMOL/L (ref 22–29)
CO2 SERPL-SCNC: 23 MMOL/L (ref 22–29)
CO2 SERPL-SCNC: 25 MMOL/L (ref 22–29)
CO2 SERPL-SCNC: 26.9 MMOL/L (ref 22–29)
CO2 SERPL-SCNC: 33 MMOL/L (ref 22–29)
CO2 SERPL-SCNC: 33 MMOL/L (ref 22–29)
CO2 SERPL-SCNC: 34 MMOL/L (ref 22–29)
CO2 SERPL-SCNC: 34 MMOL/L (ref 22–29)
CO2 SERPL-SCNC: 36 MMOL/L (ref 22–29)
CO2 SERPL-SCNC: 38 MMOL/L (ref 22–29)
CO2 SERPL-SCNC: 42 MMOL/L (ref 22–29)
CREAT SERPL-MCNC: 0.85 MG/DL (ref 0.57–1)
CREAT SERPL-MCNC: 0.86 MG/DL (ref 0.57–1)
CREAT SERPL-MCNC: 0.89 MG/DL (ref 0.57–1)
CREAT SERPL-MCNC: 0.9 MG/DL (ref 0.57–1)
CREAT SERPL-MCNC: 0.93 MG/DL (ref 0.57–1)
CREAT SERPL-MCNC: 0.96 MG/DL (ref 0.57–1)
CREAT SERPL-MCNC: 0.98 MG/DL (ref 0.57–1)
CREAT SERPL-MCNC: 1.07 MG/DL (ref 0.57–1)
CREAT SERPL-MCNC: 1.4 MG/DL (ref 0.57–1)
CREAT SERPL-MCNC: 1.44 MG/DL (ref 0.57–1)
CREAT SERPL-MCNC: 1.46 MG/DL (ref 0.57–1)
CREAT SERPL-MCNC: 1.6 MG/DL (ref 0.57–1)
CREAT SERPL-MCNC: 1.71 MG/DL (ref 0.57–1)
CREAT SERPL-MCNC: 1.77 MG/DL (ref 0.57–1)
CREAT SERPL-MCNC: 1.81 MG/DL (ref 0.57–1)
CREAT SERPL-MCNC: 1.94 MG/DL (ref 0.57–1)
D-LACTATE SERPL-SCNC: 1.2 MMOL/L (ref 0.5–2)
D-LACTATE SERPL-SCNC: 1.5 MMOL/L (ref 0.5–2)
DEPRECATED RDW RBC AUTO: 47 FL (ref 37–54)
DEPRECATED RDW RBC AUTO: 47.3 FL (ref 37–54)
DEPRECATED RDW RBC AUTO: 48.9 FL (ref 37–54)
DEPRECATED RDW RBC AUTO: 50 FL (ref 37–54)
DEPRECATED RDW RBC AUTO: 50.4 FL (ref 37–54)
DEPRECATED RDW RBC AUTO: 51.7 FL (ref 37–54)
DEPRECATED RDW RBC AUTO: 51.7 FL (ref 37–54)
DEPRECATED RDW RBC AUTO: 52 FL (ref 37–54)
DEPRECATED RDW RBC AUTO: 54.6 FL (ref 37–54)
DEPRECATED RDW RBC AUTO: 55.8 FL (ref 37–54)
DEPRECATED RDW RBC AUTO: 56.7 FL (ref 37–54)
DEPRECATED RDW RBC AUTO: 57.1 FL (ref 37–54)
DEPRECATED RDW RBC AUTO: 61 FL (ref 37–54)
EGFRCR SERPLBLD CKD-EPI 2021: 25.6 ML/MIN/1.73
EGFRCR SERPLBLD CKD-EPI 2021: 27.8 ML/MIN/1.73
EGFRCR SERPLBLD CKD-EPI 2021: 28.6 ML/MIN/1.73
EGFRCR SERPLBLD CKD-EPI 2021: 29.8 ML/MIN/1.73
EGFRCR SERPLBLD CKD-EPI 2021: 32.3 ML/MIN/1.73
EGFRCR SERPLBLD CKD-EPI 2021: 36 ML/MIN/1.73
EGFRCR SERPLBLD CKD-EPI 2021: 36.6 ML/MIN/1.73
EGFRCR SERPLBLD CKD-EPI 2021: 37.9 ML/MIN/1.73
EGFRCR SERPLBLD CKD-EPI 2021: 52.3 ML/MIN/1.73
EGFRCR SERPLBLD CKD-EPI 2021: 58.1 ML/MIN/1.73
EGFRCR SERPLBLD CKD-EPI 2021: 59.6 ML/MIN/1.73
EGFRCR SERPLBLD CKD-EPI 2021: 61.9 ML/MIN/1.73
EGFRCR SERPLBLD CKD-EPI 2021: 64.4 ML/MIN/1.73
EGFRCR SERPLBLD CKD-EPI 2021: 65.2 ML/MIN/1.73
EGFRCR SERPLBLD CKD-EPI 2021: 68 ML/MIN/1.73
EGFRCR SERPLBLD CKD-EPI 2021: 68.9 ML/MIN/1.73
EOSINOPHIL # BLD AUTO: 0 10*3/MM3 (ref 0–0.4)
EOSINOPHIL # BLD AUTO: 0.01 10*3/MM3 (ref 0–0.4)
EOSINOPHIL # BLD AUTO: 0.59 10*3/MM3 (ref 0–0.4)
EOSINOPHIL NFR BLD AUTO: 0 % (ref 0.3–6.2)
EOSINOPHIL NFR BLD AUTO: 0.2 % (ref 0.3–6.2)
EOSINOPHIL NFR BLD AUTO: 5.1 % (ref 0.3–6.2)
EPAP: 10
ERYTHROCYTE [DISTWIDTH] IN BLOOD BY AUTOMATED COUNT: 13.7 % (ref 12.3–15.4)
ERYTHROCYTE [DISTWIDTH] IN BLOOD BY AUTOMATED COUNT: 13.8 % (ref 12.3–15.4)
ERYTHROCYTE [DISTWIDTH] IN BLOOD BY AUTOMATED COUNT: 14.1 % (ref 12.3–15.4)
ERYTHROCYTE [DISTWIDTH] IN BLOOD BY AUTOMATED COUNT: 14.3 % (ref 12.3–15.4)
ERYTHROCYTE [DISTWIDTH] IN BLOOD BY AUTOMATED COUNT: 14.4 % (ref 12.3–15.4)
ERYTHROCYTE [DISTWIDTH] IN BLOOD BY AUTOMATED COUNT: 14.6 % (ref 12.3–15.4)
ERYTHROCYTE [DISTWIDTH] IN BLOOD BY AUTOMATED COUNT: 14.8 % (ref 12.3–15.4)
ERYTHROCYTE [DISTWIDTH] IN BLOOD BY AUTOMATED COUNT: 14.8 % (ref 12.3–15.4)
ERYTHROCYTE [DISTWIDTH] IN BLOOD BY AUTOMATED COUNT: 15.2 % (ref 12.3–15.4)
ERYTHROCYTE [DISTWIDTH] IN BLOOD BY AUTOMATED COUNT: 15.3 % (ref 12.3–15.4)
ERYTHROCYTE [DISTWIDTH] IN BLOOD BY AUTOMATED COUNT: 15.4 % (ref 12.3–15.4)
ERYTHROCYTE [DISTWIDTH] IN BLOOD BY AUTOMATED COUNT: 15.9 % (ref 12.3–15.4)
ERYTHROCYTE [DISTWIDTH] IN BLOOD BY AUTOMATED COUNT: 15.9 % (ref 12.3–15.4)
FLUAV SUBTYP SPEC NAA+PROBE: NOT DETECTED
FLUAV SUBTYP SPEC NAA+PROBE: NOT DETECTED
FLUBV RNA ISLT QL NAA+PROBE: NOT DETECTED
FLUBV RNA ISLT QL NAA+PROBE: NOT DETECTED
GAS FLOW AIRWAY: 2 LPM
GAS FLOW AIRWAY: 2 LPM
GAS FLOW AIRWAY: 3 LPM
GLOBULIN UR ELPH-MCNC: 2.6 GM/DL
GLOBULIN UR ELPH-MCNC: 3.6 GM/DL
GLUCOSE SERPL-MCNC: 105 MG/DL (ref 65–99)
GLUCOSE SERPL-MCNC: 118 MG/DL (ref 65–99)
GLUCOSE SERPL-MCNC: 120 MG/DL (ref 65–99)
GLUCOSE SERPL-MCNC: 120 MG/DL (ref 65–99)
GLUCOSE SERPL-MCNC: 125 MG/DL (ref 65–99)
GLUCOSE SERPL-MCNC: 126 MG/DL (ref 65–99)
GLUCOSE SERPL-MCNC: 139 MG/DL (ref 65–99)
GLUCOSE SERPL-MCNC: 140 MG/DL (ref 65–99)
GLUCOSE SERPL-MCNC: 143 MG/DL (ref 65–99)
GLUCOSE SERPL-MCNC: 153 MG/DL (ref 65–99)
GLUCOSE SERPL-MCNC: 172 MG/DL (ref 65–99)
GLUCOSE SERPL-MCNC: 185 MG/DL (ref 65–99)
GLUCOSE SERPL-MCNC: 225 MG/DL (ref 65–99)
GLUCOSE SERPL-MCNC: 80 MG/DL (ref 65–99)
GLUCOSE SERPL-MCNC: 88 MG/DL (ref 65–99)
GLUCOSE SERPL-MCNC: 95 MG/DL (ref 65–99)
GRAM STN SPEC: NORMAL
HCO3 BLDA-SCNC: 24.1 MMOL/L (ref 20–26)
HCO3 BLDA-SCNC: 25.3 MMOL/L (ref 20–26)
HCO3 BLDA-SCNC: 37.8 MMOL/L (ref 20–26)
HCO3 BLDA-SCNC: 42.6 MMOL/L (ref 20–26)
HCT VFR BLD AUTO: 28.2 % (ref 34–46.6)
HCT VFR BLD AUTO: 29.3 % (ref 34–46.6)
HCT VFR BLD AUTO: 29.4 % (ref 34–46.6)
HCT VFR BLD AUTO: 30 % (ref 34–46.6)
HCT VFR BLD AUTO: 30.5 % (ref 34–46.6)
HCT VFR BLD AUTO: 30.8 % (ref 34–46.6)
HCT VFR BLD AUTO: 31 % (ref 34–46.6)
HCT VFR BLD AUTO: 31.8 % (ref 34–46.6)
HCT VFR BLD AUTO: 32.6 % (ref 34–46.6)
HCT VFR BLD AUTO: 33.2 % (ref 34–46.6)
HCT VFR BLD AUTO: 33.4 % (ref 34–46.6)
HCT VFR BLD AUTO: 34.6 % (ref 34–46.6)
HCT VFR BLD AUTO: 36.5 % (ref 34–46.6)
HGB BLD-MCNC: 10 G/DL (ref 12–15.9)
HGB BLD-MCNC: 10 G/DL (ref 12–15.9)
HGB BLD-MCNC: 10.1 G/DL (ref 12–15.9)
HGB BLD-MCNC: 10.1 G/DL (ref 12–15.9)
HGB BLD-MCNC: 10.5 G/DL (ref 12–15.9)
HGB BLD-MCNC: 10.6 G/DL (ref 12–15.9)
HGB BLD-MCNC: 10.7 G/DL (ref 12–15.9)
HGB BLD-MCNC: 9.2 G/DL (ref 12–15.9)
HGB BLD-MCNC: 9.3 G/DL (ref 12–15.9)
HGB BLD-MCNC: 9.4 G/DL (ref 12–15.9)
HGB BLD-MCNC: 9.6 G/DL (ref 12–15.9)
HOLD SPECIMEN: NORMAL
IMM GRANULOCYTES # BLD AUTO: 0.02 10*3/MM3 (ref 0–0.05)
IMM GRANULOCYTES # BLD AUTO: 0.03 10*3/MM3 (ref 0–0.05)
IMM GRANULOCYTES # BLD AUTO: 0.04 10*3/MM3 (ref 0–0.05)
IMM GRANULOCYTES # BLD AUTO: 0.07 10*3/MM3 (ref 0–0.05)
IMM GRANULOCYTES # BLD AUTO: 0.1 10*3/MM3 (ref 0–0.05)
IMM GRANULOCYTES # BLD AUTO: 0.13 10*3/MM3 (ref 0–0.05)
IMM GRANULOCYTES # BLD AUTO: 0.15 10*3/MM3 (ref 0–0.05)
IMM GRANULOCYTES # BLD AUTO: 0.27 10*3/MM3 (ref 0–0.05)
IMM GRANULOCYTES # BLD AUTO: 0.28 10*3/MM3 (ref 0–0.05)
IMM GRANULOCYTES # BLD AUTO: 0.3 10*3/MM3 (ref 0–0.05)
IMM GRANULOCYTES # BLD AUTO: 0.41 10*3/MM3 (ref 0–0.05)
IMM GRANULOCYTES # BLD AUTO: 0.44 10*3/MM3 (ref 0–0.05)
IMM GRANULOCYTES NFR BLD AUTO: 0.4 % (ref 0–0.5)
IMM GRANULOCYTES NFR BLD AUTO: 0.5 % (ref 0–0.5)
IMM GRANULOCYTES NFR BLD AUTO: 0.6 % (ref 0–0.5)
IMM GRANULOCYTES NFR BLD AUTO: 0.6 % (ref 0–0.5)
IMM GRANULOCYTES NFR BLD AUTO: 0.9 % (ref 0–0.5)
IMM GRANULOCYTES NFR BLD AUTO: 1.2 % (ref 0–0.5)
IMM GRANULOCYTES NFR BLD AUTO: 1.8 % (ref 0–0.5)
IMM GRANULOCYTES NFR BLD AUTO: 2.1 % (ref 0–0.5)
IMM GRANULOCYTES NFR BLD AUTO: 3.8 % (ref 0–0.5)
IMM GRANULOCYTES NFR BLD AUTO: 3.9 % (ref 0–0.5)
IMM GRANULOCYTES NFR BLD AUTO: 3.9 % (ref 0–0.5)
IMM GRANULOCYTES NFR BLD AUTO: 4.7 % (ref 0–0.5)
INHALED O2 CONCENTRATION: 70 %
INHALED O2 CONCENTRATION: <21 %
LEFT ATRIUM VOLUME INDEX: 30.2 ML/M2
LYMPHOCYTES # BLD AUTO: 0.28 10*3/MM3 (ref 0.7–3.1)
LYMPHOCYTES # BLD AUTO: 0.33 10*3/MM3 (ref 0.7–3.1)
LYMPHOCYTES # BLD AUTO: 0.35 10*3/MM3 (ref 0.7–3.1)
LYMPHOCYTES # BLD AUTO: 0.36 10*3/MM3 (ref 0.7–3.1)
LYMPHOCYTES # BLD AUTO: 0.47 10*3/MM3 (ref 0.7–3.1)
LYMPHOCYTES # BLD AUTO: 0.63 10*3/MM3 (ref 0.7–3.1)
LYMPHOCYTES # BLD AUTO: 0.71 10*3/MM3 (ref 0.7–3.1)
LYMPHOCYTES # BLD AUTO: 0.73 10*3/MM3 (ref 0.7–3.1)
LYMPHOCYTES # BLD AUTO: 0.78 10*3/MM3 (ref 0.7–3.1)
LYMPHOCYTES # BLD AUTO: 0.84 10*3/MM3 (ref 0.7–3.1)
LYMPHOCYTES # BLD AUTO: 0.84 10*3/MM3 (ref 0.7–3.1)
LYMPHOCYTES # BLD AUTO: 1.06 10*3/MM3 (ref 0.7–3.1)
LYMPHOCYTES NFR BLD AUTO: 10.2 % (ref 19.6–45.3)
LYMPHOCYTES NFR BLD AUTO: 15.7 % (ref 19.6–45.3)
LYMPHOCYTES NFR BLD AUTO: 2.6 % (ref 19.6–45.3)
LYMPHOCYTES NFR BLD AUTO: 4.5 % (ref 19.6–45.3)
LYMPHOCYTES NFR BLD AUTO: 5.3 % (ref 19.6–45.3)
LYMPHOCYTES NFR BLD AUTO: 5.6 % (ref 19.6–45.3)
LYMPHOCYTES NFR BLD AUTO: 5.8 % (ref 19.6–45.3)
LYMPHOCYTES NFR BLD AUTO: 5.8 % (ref 19.6–45.3)
LYMPHOCYTES NFR BLD AUTO: 5.9 % (ref 19.6–45.3)
LYMPHOCYTES NFR BLD AUTO: 6.8 % (ref 19.6–45.3)
LYMPHOCYTES NFR BLD AUTO: 8.3 % (ref 19.6–45.3)
LYMPHOCYTES NFR BLD AUTO: 9.1 % (ref 19.6–45.3)
Lab: ABNORMAL
MAGNESIUM SERPL-MCNC: 2.1 MG/DL (ref 1.6–2.4)
MAGNESIUM SERPL-MCNC: 2.2 MG/DL (ref 1.6–2.4)
MAGNESIUM SERPL-MCNC: 2.6 MG/DL (ref 1.6–2.4)
MAXIMAL PREDICTED HEART RATE: 139 BPM
MCH RBC QN AUTO: 30.3 PG (ref 26.6–33)
MCH RBC QN AUTO: 30.3 PG (ref 26.6–33)
MCH RBC QN AUTO: 30.4 PG (ref 26.6–33)
MCH RBC QN AUTO: 30.4 PG (ref 26.6–33)
MCH RBC QN AUTO: 30.5 PG (ref 26.6–33)
MCH RBC QN AUTO: 30.8 PG (ref 26.6–33)
MCH RBC QN AUTO: 30.9 PG (ref 26.6–33)
MCH RBC QN AUTO: 30.9 PG (ref 26.6–33)
MCH RBC QN AUTO: 31 PG (ref 26.6–33)
MCH RBC QN AUTO: 31 PG (ref 26.6–33)
MCH RBC QN AUTO: 31.1 PG (ref 26.6–33)
MCH RBC QN AUTO: 31.2 PG (ref 26.6–33)
MCH RBC QN AUTO: 31.5 PG (ref 26.6–33)
MCHC RBC AUTO-ENTMCNC: 29.3 G/DL (ref 31.5–35.7)
MCHC RBC AUTO-ENTMCNC: 30.3 G/DL (ref 31.5–35.7)
MCHC RBC AUTO-ENTMCNC: 31.4 G/DL (ref 31.5–35.7)
MCHC RBC AUTO-ENTMCNC: 31.5 G/DL (ref 31.5–35.7)
MCHC RBC AUTO-ENTMCNC: 31.6 G/DL (ref 31.5–35.7)
MCHC RBC AUTO-ENTMCNC: 31.8 G/DL (ref 31.5–35.7)
MCHC RBC AUTO-ENTMCNC: 31.9 G/DL (ref 31.5–35.7)
MCHC RBC AUTO-ENTMCNC: 32.1 G/DL (ref 31.5–35.7)
MCHC RBC AUTO-ENTMCNC: 32.2 G/DL (ref 31.5–35.7)
MCHC RBC AUTO-ENTMCNC: 32.3 G/DL (ref 31.5–35.7)
MCHC RBC AUTO-ENTMCNC: 32.6 G/DL (ref 31.5–35.7)
MCHC RBC AUTO-ENTMCNC: 32.8 G/DL (ref 31.5–35.7)
MCHC RBC AUTO-ENTMCNC: 33.3 G/DL (ref 31.5–35.7)
MCV RBC AUTO: 100.3 FL (ref 79–97)
MCV RBC AUTO: 103.4 FL (ref 79–97)
MCV RBC AUTO: 92.3 FL (ref 79–97)
MCV RBC AUTO: 94.2 FL (ref 79–97)
MCV RBC AUTO: 94.9 FL (ref 79–97)
MCV RBC AUTO: 95.1 FL (ref 79–97)
MCV RBC AUTO: 95.8 FL (ref 79–97)
MCV RBC AUTO: 96.1 FL (ref 79–97)
MCV RBC AUTO: 96.2 FL (ref 79–97)
MCV RBC AUTO: 96.4 FL (ref 79–97)
MCV RBC AUTO: 97.8 FL (ref 79–97)
MCV RBC AUTO: 98.7 FL (ref 79–97)
MCV RBC AUTO: 99.1 FL (ref 79–97)
MODALITY: ABNORMAL
MONOCYTES # BLD AUTO: 0.11 10*3/MM3 (ref 0.1–0.9)
MONOCYTES # BLD AUTO: 0.14 10*3/MM3 (ref 0.1–0.9)
MONOCYTES # BLD AUTO: 0.17 10*3/MM3 (ref 0.1–0.9)
MONOCYTES # BLD AUTO: 0.27 10*3/MM3 (ref 0.1–0.9)
MONOCYTES # BLD AUTO: 0.32 10*3/MM3 (ref 0.1–0.9)
MONOCYTES # BLD AUTO: 0.45 10*3/MM3 (ref 0.1–0.9)
MONOCYTES # BLD AUTO: 0.57 10*3/MM3 (ref 0.1–0.9)
MONOCYTES # BLD AUTO: 0.58 10*3/MM3 (ref 0.1–0.9)
MONOCYTES # BLD AUTO: 0.63 10*3/MM3 (ref 0.1–0.9)
MONOCYTES # BLD AUTO: 0.64 10*3/MM3 (ref 0.1–0.9)
MONOCYTES # BLD AUTO: 1.04 10*3/MM3 (ref 0.1–0.9)
MONOCYTES # BLD AUTO: 1.05 10*3/MM3 (ref 0.1–0.9)
MONOCYTES NFR BLD AUTO: 1.8 % (ref 5–12)
MONOCYTES NFR BLD AUTO: 2.1 % (ref 5–12)
MONOCYTES NFR BLD AUTO: 2.2 % (ref 5–12)
MONOCYTES NFR BLD AUTO: 2.6 % (ref 5–12)
MONOCYTES NFR BLD AUTO: 4 % (ref 5–12)
MONOCYTES NFR BLD AUTO: 5.4 % (ref 5–12)
MONOCYTES NFR BLD AUTO: 5.8 % (ref 5–12)
MONOCYTES NFR BLD AUTO: 6.2 % (ref 5–12)
MONOCYTES NFR BLD AUTO: 7 % (ref 5–12)
MONOCYTES NFR BLD AUTO: 8.4 % (ref 5–12)
MONOCYTES NFR BLD AUTO: 8.9 % (ref 5–12)
MONOCYTES NFR BLD AUTO: 9.1 % (ref 5–12)
NEUTROPHILS NFR BLD AUTO: 11.91 10*3/MM3 (ref 1.7–7)
NEUTROPHILS NFR BLD AUTO: 12.65 10*3/MM3 (ref 1.7–7)
NEUTROPHILS NFR BLD AUTO: 4.02 10*3/MM3 (ref 1.7–7)
NEUTROPHILS NFR BLD AUTO: 5.52 10*3/MM3 (ref 1.7–7)
NEUTROPHILS NFR BLD AUTO: 5.66 10*3/MM3 (ref 1.7–7)
NEUTROPHILS NFR BLD AUTO: 5.74 10*3/MM3 (ref 1.7–7)
NEUTROPHILS NFR BLD AUTO: 6.06 10*3/MM3 (ref 1.7–7)
NEUTROPHILS NFR BLD AUTO: 6.8 10*3/MM3 (ref 1.7–7)
NEUTROPHILS NFR BLD AUTO: 7.59 10*3/MM3 (ref 1.7–7)
NEUTROPHILS NFR BLD AUTO: 75.1 % (ref 42.7–76)
NEUTROPHILS NFR BLD AUTO: 75.5 % (ref 42.7–76)
NEUTROPHILS NFR BLD AUTO: 76.7 % (ref 42.7–76)
NEUTROPHILS NFR BLD AUTO: 8.74 10*3/MM3 (ref 1.7–7)
NEUTROPHILS NFR BLD AUTO: 8.77 10*3/MM3 (ref 1.7–7)
NEUTROPHILS NFR BLD AUTO: 80.6 % (ref 42.7–76)
NEUTROPHILS NFR BLD AUTO: 83.5 % (ref 42.7–76)
NEUTROPHILS NFR BLD AUTO: 85.1 % (ref 42.7–76)
NEUTROPHILS NFR BLD AUTO: 85.9 % (ref 42.7–76)
NEUTROPHILS NFR BLD AUTO: 87.7 % (ref 42.7–76)
NEUTROPHILS NFR BLD AUTO: 9.53 10*3/MM3 (ref 1.7–7)
NEUTROPHILS NFR BLD AUTO: 90.9 % (ref 42.7–76)
NEUTROPHILS NFR BLD AUTO: 91.3 % (ref 42.7–76)
NEUTROPHILS NFR BLD AUTO: 91.9 % (ref 42.7–76)
NEUTROPHILS NFR BLD AUTO: 93.9 % (ref 42.7–76)
NRBC BLD AUTO-RTO: 0 /100 WBC (ref 0–0.2)
NRBC BLD AUTO-RTO: 0.3 /100 WBC (ref 0–0.2)
NRBC BLD AUTO-RTO: 0.4 /100 WBC (ref 0–0.2)
NRBC BLD AUTO-RTO: 0.5 /100 WBC (ref 0–0.2)
NT-PROBNP SERPL-MCNC: 5107 PG/ML (ref 0–1800)
NT-PROBNP SERPL-MCNC: ABNORMAL PG/ML (ref 0–1800)
PCO2 BLDA: 46 MM HG (ref 35–45)
PCO2 BLDA: 59.1 MM HG (ref 35–45)
PCO2 BLDA: 67 MM HG (ref 35–45)
PCO2 BLDA: 73.7 MM HG (ref 35–45)
PH BLDA: 7.22 PH UNITS (ref 7.35–7.45)
PH BLDA: 7.32 PH UNITS (ref 7.35–7.45)
PH BLDA: 7.35 PH UNITS (ref 7.35–7.45)
PH BLDA: 7.41 PH UNITS (ref 7.35–7.45)
PLATELET # BLD AUTO: 105 10*3/MM3 (ref 140–450)
PLATELET # BLD AUTO: 129 10*3/MM3 (ref 140–450)
PLATELET # BLD AUTO: 135 10*3/MM3 (ref 140–450)
PLATELET # BLD AUTO: 138 10*3/MM3 (ref 140–450)
PLATELET # BLD AUTO: 145 10*3/MM3 (ref 140–450)
PLATELET # BLD AUTO: 260 10*3/MM3 (ref 140–450)
PLATELET # BLD AUTO: 282 10*3/MM3 (ref 140–450)
PLATELET # BLD AUTO: 285 10*3/MM3 (ref 140–450)
PLATELET # BLD AUTO: 285 10*3/MM3 (ref 140–450)
PLATELET # BLD AUTO: 320 10*3/MM3 (ref 140–450)
PLATELET # BLD AUTO: 326 10*3/MM3 (ref 140–450)
PLATELET # BLD AUTO: 340 10*3/MM3 (ref 140–450)
PLATELET # BLD AUTO: 343 10*3/MM3 (ref 140–450)
PMV BLD AUTO: 10 FL (ref 6–12)
PMV BLD AUTO: 9.1 FL (ref 6–12)
PMV BLD AUTO: 9.2 FL (ref 6–12)
PMV BLD AUTO: 9.3 FL (ref 6–12)
PMV BLD AUTO: 9.4 FL (ref 6–12)
PMV BLD AUTO: 9.6 FL (ref 6–12)
PMV BLD AUTO: 9.7 FL (ref 6–12)
PMV BLD AUTO: 9.9 FL (ref 6–12)
PMV BLD AUTO: 9.9 FL (ref 6–12)
PO2 BLDA: 129 MM HG (ref 83–108)
PO2 BLDA: 220 MM HG (ref 83–108)
PO2 BLDA: 76.6 MM HG (ref 83–108)
PO2 BLDA: 98.9 MM HG (ref 83–108)
POTASSIUM SERPL-SCNC: 3.7 MMOL/L (ref 3.5–5.2)
POTASSIUM SERPL-SCNC: 3.8 MMOL/L (ref 3.5–5.2)
POTASSIUM SERPL-SCNC: 4.2 MMOL/L (ref 3.5–5.2)
POTASSIUM SERPL-SCNC: 4.5 MMOL/L (ref 3.5–5.2)
POTASSIUM SERPL-SCNC: 4.8 MMOL/L (ref 3.5–5.2)
POTASSIUM SERPL-SCNC: 4.9 MMOL/L (ref 3.5–5.2)
POTASSIUM SERPL-SCNC: 4.9 MMOL/L (ref 3.5–5.2)
POTASSIUM SERPL-SCNC: 5 MMOL/L (ref 3.5–5.2)
POTASSIUM SERPL-SCNC: 5 MMOL/L (ref 3.5–5.2)
POTASSIUM SERPL-SCNC: 5.3 MMOL/L (ref 3.5–5.2)
POTASSIUM SERPL-SCNC: 5.9 MMOL/L (ref 3.5–5.2)
POTASSIUM SERPL-SCNC: 6 MMOL/L (ref 3.5–5.2)
POTASSIUM SERPL-SCNC: 6.5 MMOL/L (ref 3.5–5.2)
PROT SERPL-MCNC: 6 G/DL (ref 6–8.5)
PROT SERPL-MCNC: 7 G/DL (ref 6–8.5)
QT INTERVAL: 318 MS
QT INTERVAL: 326 MS
QT INTERVAL: 334 MS
QT INTERVAL: 352 MS
QTC INTERVAL: 385 MS
QTC INTERVAL: 472 MS
QTC INTERVAL: 486 MS
QTC INTERVAL: 490 MS
RBC # BLD AUTO: 2.97 10*6/MM3 (ref 3.77–5.28)
RBC # BLD AUTO: 3.04 10*6/MM3 (ref 3.77–5.28)
RBC # BLD AUTO: 3.07 10*6/MM3 (ref 3.77–5.28)
RBC # BLD AUTO: 3.09 10*6/MM3 (ref 3.77–5.28)
RBC # BLD AUTO: 3.17 10*6/MM3 (ref 3.77–5.28)
RBC # BLD AUTO: 3.25 10*6/MM3 (ref 3.77–5.28)
RBC # BLD AUTO: 3.27 10*6/MM3 (ref 3.77–5.28)
RBC # BLD AUTO: 3.31 10*6/MM3 (ref 3.77–5.28)
RBC # BLD AUTO: 3.37 10*6/MM3 (ref 3.77–5.28)
RBC # BLD AUTO: 3.39 10*6/MM3 (ref 3.77–5.28)
RBC # BLD AUTO: 3.45 10*6/MM3 (ref 3.77–5.28)
RBC # BLD AUTO: 3.49 10*6/MM3 (ref 3.77–5.28)
RBC # BLD AUTO: 3.53 10*6/MM3 (ref 3.77–5.28)
SAO2 % BLDCOA: 96.2 % (ref 94–99)
SAO2 % BLDCOA: 98.2 % (ref 94–99)
SAO2 % BLDCOA: 99.3 % (ref 94–99)
SAO2 % BLDCOA: >100 % (ref 94–99)
SARS-COV-2 N GENE RESP QL NAA+PROBE: NOT DETECTED
SARS-COV-2 RNA PNL SPEC NAA+PROBE: NOT DETECTED
SARS-COV-2 RNA PNL SPEC NAA+PROBE: NOT DETECTED
SET MECH RESP RATE: 18
SODIUM SERPL-SCNC: 136 MMOL/L (ref 136–145)
SODIUM SERPL-SCNC: 138 MMOL/L (ref 136–145)
SODIUM SERPL-SCNC: 138 MMOL/L (ref 136–145)
SODIUM SERPL-SCNC: 139 MMOL/L (ref 136–145)
SODIUM SERPL-SCNC: 139 MMOL/L (ref 136–145)
SODIUM SERPL-SCNC: 140 MMOL/L (ref 136–145)
SODIUM SERPL-SCNC: 141 MMOL/L (ref 136–145)
SODIUM SERPL-SCNC: 142 MMOL/L (ref 136–145)
SODIUM SERPL-SCNC: 143 MMOL/L (ref 136–145)
SODIUM SERPL-SCNC: 145 MMOL/L (ref 136–145)
SODIUM SERPL-SCNC: 145 MMOL/L (ref 136–145)
SODIUM SERPL-SCNC: 154 MMOL/L (ref 136–145)
STRESS TARGET HR: 118 BPM
T4 FREE SERPL-MCNC: 0.95 NG/DL (ref 0.93–1.7)
TROPONIN T SERPL-MCNC: 0.01 NG/ML (ref 0–0.03)
TROPONIN T SERPL-MCNC: 0.04 NG/ML (ref 0–0.03)
TROPONIN T SERPL-MCNC: 0.19 NG/ML (ref 0–0.03)
TSH SERPL DL<=0.05 MIU/L-ACNC: 1.47 UIU/ML (ref 0.27–4.2)
VENTILATOR MODE: ABNORMAL
VIT B12 BLD-MCNC: 738 PG/ML (ref 211–946)
VT ON VENT VENT: 450 ML
WBC NRBC COR # BLD: 10.47 10*3/MM3 (ref 3.4–10.8)
WBC NRBC COR # BLD: 10.87 10*3/MM3 (ref 3.4–10.8)
WBC NRBC COR # BLD: 11.6 10*3/MM3 (ref 3.4–10.8)
WBC NRBC COR # BLD: 12.68 10*3/MM3 (ref 3.4–10.8)
WBC NRBC COR # BLD: 14.87 10*3/MM3 (ref 3.4–10.8)
WBC NRBC COR # BLD: 5.35 10*3/MM3 (ref 3.4–10.8)
WBC NRBC COR # BLD: 6.23 10*3/MM3 (ref 3.4–10.8)
WBC NRBC COR # BLD: 6.24 10*3/MM3 (ref 3.4–10.8)
WBC NRBC COR # BLD: 6.63 10*3/MM3 (ref 3.4–10.8)
WBC NRBC COR # BLD: 6.92 10*3/MM3 (ref 3.4–10.8)
WBC NRBC COR # BLD: 7.19 10*3/MM3 (ref 3.4–10.8)
WBC NRBC COR # BLD: 7.92 10*3/MM3 (ref 3.4–10.8)
WBC NRBC COR # BLD: 9.41 10*3/MM3 (ref 3.4–10.8)
WHOLE BLOOD HOLD COAG: NORMAL
WHOLE BLOOD HOLD COAG: NORMAL
WHOLE BLOOD HOLD SPECIMEN: NORMAL

## 2022-01-01 PROCEDURE — 96374 THER/PROPH/DIAG INJ IV PUSH: CPT

## 2022-01-01 PROCEDURE — 94761 N-INVAS EAR/PLS OXIMETRY MLT: CPT

## 2022-01-01 PROCEDURE — 94799 UNLISTED PULMONARY SVC/PX: CPT

## 2022-01-01 PROCEDURE — 99222 1ST HOSP IP/OBS MODERATE 55: CPT | Performed by: INTERNAL MEDICINE

## 2022-01-01 PROCEDURE — 80048 BASIC METABOLIC PNL TOTAL CA: CPT | Performed by: INTERNAL MEDICINE

## 2022-01-01 PROCEDURE — 25010000002 AZITHROMYCIN PER 500 MG: Performed by: INTERNAL MEDICINE

## 2022-01-01 PROCEDURE — 25010000002 HEPARIN (PORCINE) PER 1000 UNITS: Performed by: INTERNAL MEDICINE

## 2022-01-01 PROCEDURE — 94660 CPAP INITIATION&MGMT: CPT

## 2022-01-01 PROCEDURE — 25010000002 FUROSEMIDE PER 20 MG: Performed by: INTERNAL MEDICINE

## 2022-01-01 PROCEDURE — 25010000002 PERFLUTREN (DEFINITY) 8.476 MG IN SODIUM CHLORIDE (PF) 0.9 % 10 ML INJECTION: Performed by: INTERNAL MEDICINE

## 2022-01-01 PROCEDURE — 94760 N-INVAS EAR/PLS OXIMETRY 1: CPT

## 2022-01-01 PROCEDURE — 85025 COMPLETE CBC W/AUTO DIFF WBC: CPT | Performed by: STUDENT IN AN ORGANIZED HEALTH CARE EDUCATION/TRAINING PROGRAM

## 2022-01-01 PROCEDURE — 93005 ELECTROCARDIOGRAM TRACING: CPT | Performed by: HOSPITALIST

## 2022-01-01 PROCEDURE — 92526 ORAL FUNCTION THERAPY: CPT | Performed by: SPEECH-LANGUAGE PATHOLOGIST

## 2022-01-01 PROCEDURE — 25010000002 CEFTRIAXONE PER 250 MG: Performed by: INTERNAL MEDICINE

## 2022-01-01 PROCEDURE — 96375 TX/PRO/DX INJ NEW DRUG ADDON: CPT

## 2022-01-01 PROCEDURE — 84484 ASSAY OF TROPONIN QUANT: CPT | Performed by: STUDENT IN AN ORGANIZED HEALTH CARE EDUCATION/TRAINING PROGRAM

## 2022-01-01 PROCEDURE — 84439 ASSAY OF FREE THYROXINE: CPT | Performed by: INTERNAL MEDICINE

## 2022-01-01 PROCEDURE — 99496 TRANSJ CARE MGMT HIGH F2F 7D: CPT | Performed by: FAMILY MEDICINE

## 2022-01-01 PROCEDURE — 96372 THER/PROPH/DIAG INJ SC/IM: CPT | Performed by: FAMILY MEDICINE

## 2022-01-01 PROCEDURE — 94664 DEMO&/EVAL PT USE INHALER: CPT

## 2022-01-01 PROCEDURE — 94640 AIRWAY INHALATION TREATMENT: CPT | Performed by: FAMILY MEDICINE

## 2022-01-01 PROCEDURE — 83605 ASSAY OF LACTIC ACID: CPT | Performed by: STUDENT IN AN ORGANIZED HEALTH CARE EDUCATION/TRAINING PROGRAM

## 2022-01-01 PROCEDURE — 85025 COMPLETE CBC W/AUTO DIFF WBC: CPT | Performed by: INTERNAL MEDICINE

## 2022-01-01 PROCEDURE — 97116 GAIT TRAINING THERAPY: CPT

## 2022-01-01 PROCEDURE — 71046 X-RAY EXAM CHEST 2 VIEWS: CPT

## 2022-01-01 PROCEDURE — G0378 HOSPITAL OBSERVATION PER HR: HCPCS

## 2022-01-01 PROCEDURE — 93970 EXTREMITY STUDY: CPT

## 2022-01-01 PROCEDURE — G0299 HHS/HOSPICE OF RN EA 15 MIN: HCPCS

## 2022-01-01 PROCEDURE — 80053 COMPREHEN METABOLIC PANEL: CPT | Performed by: STUDENT IN AN ORGANIZED HEALTH CARE EDUCATION/TRAINING PROGRAM

## 2022-01-01 PROCEDURE — 93005 ELECTROCARDIOGRAM TRACING: CPT | Performed by: STUDENT IN AN ORGANIZED HEALTH CARE EDUCATION/TRAINING PROGRAM

## 2022-01-01 PROCEDURE — 25010000002 CEFTRIAXONE PER 250 MG: Performed by: FAMILY MEDICINE

## 2022-01-01 PROCEDURE — 93010 ELECTROCARDIOGRAM REPORT: CPT | Performed by: INTERNAL MEDICINE

## 2022-01-01 PROCEDURE — 97530 THERAPEUTIC ACTIVITIES: CPT

## 2022-01-01 PROCEDURE — 83735 ASSAY OF MAGNESIUM: CPT | Performed by: INTERNAL MEDICINE

## 2022-01-01 PROCEDURE — 1111F DSCHRG MED/CURRENT MED MERGE: CPT | Performed by: FAMILY MEDICINE

## 2022-01-01 PROCEDURE — 97110 THERAPEUTIC EXERCISES: CPT

## 2022-01-01 PROCEDURE — 63710000001 PREDNISONE PER 1 MG: Performed by: INTERNAL MEDICINE

## 2022-01-01 PROCEDURE — 97535 SELF CARE MNGMENT TRAINING: CPT

## 2022-01-01 PROCEDURE — 36600 WITHDRAWAL OF ARTERIAL BLOOD: CPT

## 2022-01-01 PROCEDURE — C9803 HOPD COVID-19 SPEC COLLECT: HCPCS

## 2022-01-01 PROCEDURE — 80048 BASIC METABOLIC PNL TOTAL CA: CPT | Performed by: NURSE PRACTITIONER

## 2022-01-01 PROCEDURE — 25010000002 METHYLPREDNISOLONE PER 40 MG: Performed by: INTERNAL MEDICINE

## 2022-01-01 PROCEDURE — 94640 AIRWAY INHALATION TREATMENT: CPT

## 2022-01-01 PROCEDURE — 87040 BLOOD CULTURE FOR BACTERIA: CPT | Performed by: STUDENT IN AN ORGANIZED HEALTH CARE EDUCATION/TRAINING PROGRAM

## 2022-01-01 PROCEDURE — G0494 LPN CARE EA 15MIN HH/HOSPICE: HCPCS

## 2022-01-01 PROCEDURE — 87636 SARSCOV2 & INF A&B AMP PRB: CPT | Performed by: STUDENT IN AN ORGANIZED HEALTH CARE EDUCATION/TRAINING PROGRAM

## 2022-01-01 PROCEDURE — 97166 OT EVAL MOD COMPLEX 45 MIN: CPT

## 2022-01-01 PROCEDURE — 82607 VITAMIN B-12: CPT | Performed by: INTERNAL MEDICINE

## 2022-01-01 PROCEDURE — G0180 MD CERTIFICATION HHA PATIENT: HCPCS | Performed by: FAMILY MEDICINE

## 2022-01-01 PROCEDURE — 99285 EMERGENCY DEPT VISIT HI MDM: CPT

## 2022-01-01 PROCEDURE — 99213 OFFICE O/P EST LOW 20 MIN: CPT | Performed by: NURSE PRACTITIONER

## 2022-01-01 PROCEDURE — 99284 EMERGENCY DEPT VISIT MOD MDM: CPT

## 2022-01-01 PROCEDURE — 0 IOPAMIDOL PER 1 ML: Performed by: STUDENT IN AN ORGANIZED HEALTH CARE EDUCATION/TRAINING PROGRAM

## 2022-01-01 PROCEDURE — 84443 ASSAY THYROID STIM HORMONE: CPT | Performed by: INTERNAL MEDICINE

## 2022-01-01 PROCEDURE — 93306 TTE W/DOPPLER COMPLETE: CPT | Performed by: INTERNAL MEDICINE

## 2022-01-01 PROCEDURE — 85027 COMPLETE CBC AUTOMATED: CPT | Performed by: INTERNAL MEDICINE

## 2022-01-01 PROCEDURE — 99213 OFFICE O/P EST LOW 20 MIN: CPT | Performed by: FAMILY MEDICINE

## 2022-01-01 PROCEDURE — 25010000002 FUROSEMIDE PER 20 MG: Performed by: STUDENT IN AN ORGANIZED HEALTH CARE EDUCATION/TRAINING PROGRAM

## 2022-01-01 PROCEDURE — 83880 ASSAY OF NATRIURETIC PEPTIDE: CPT | Performed by: STUDENT IN AN ORGANIZED HEALTH CARE EDUCATION/TRAINING PROGRAM

## 2022-01-01 PROCEDURE — 71045 X-RAY EXAM CHEST 1 VIEW: CPT

## 2022-01-01 PROCEDURE — 96376 TX/PRO/DX INJ SAME DRUG ADON: CPT

## 2022-01-01 PROCEDURE — 84484 ASSAY OF TROPONIN QUANT: CPT | Performed by: INTERNAL MEDICINE

## 2022-01-01 PROCEDURE — 36415 COLL VENOUS BLD VENIPUNCTURE: CPT | Performed by: STUDENT IN AN ORGANIZED HEALTH CARE EDUCATION/TRAINING PROGRAM

## 2022-01-01 PROCEDURE — 93306 TTE W/DOPPLER COMPLETE: CPT

## 2022-01-01 PROCEDURE — G0158 HHC OT ASSISTANT EA 15: HCPCS

## 2022-01-01 PROCEDURE — 82803 BLOOD GASES ANY COMBINATION: CPT

## 2022-01-01 PROCEDURE — G0152 HHCP-SERV OF OT,EA 15 MIN: HCPCS

## 2022-01-01 PROCEDURE — 96361 HYDRATE IV INFUSION ADD-ON: CPT

## 2022-01-01 PROCEDURE — 84132 ASSAY OF SERUM POTASSIUM: CPT | Performed by: INTERNAL MEDICINE

## 2022-01-01 PROCEDURE — 92610 EVALUATE SWALLOWING FUNCTION: CPT | Performed by: SPEECH-LANGUAGE PATHOLOGIST

## 2022-01-01 PROCEDURE — 97162 PT EVAL MOD COMPLEX 30 MIN: CPT

## 2022-01-01 PROCEDURE — 71260 CT THORAX DX C+: CPT

## 2022-01-01 PROCEDURE — 87635 SARS-COV-2 COVID-19 AMP PRB: CPT

## 2022-01-01 PROCEDURE — 93005 ELECTROCARDIOGRAM TRACING: CPT | Performed by: INTERNAL MEDICINE

## 2022-01-01 PROCEDURE — G0151 HHCP-SERV OF PT,EA 15 MIN: HCPCS

## 2022-01-01 PROCEDURE — 25010000002 LORAZEPAM PER 2 MG: Performed by: INTERNAL MEDICINE

## 2022-01-01 PROCEDURE — 93005 ELECTROCARDIOGRAM TRACING: CPT

## 2022-01-01 PROCEDURE — 87205 SMEAR GRAM STAIN: CPT | Performed by: INTERNAL MEDICINE

## 2022-01-01 PROCEDURE — 25010000002 MORPHINE PER 10 MG: Performed by: INTERNAL MEDICINE

## 2022-01-01 RX ORDER — SODIUM CHLORIDE 0.9 % (FLUSH) 0.9 %
10 SYRINGE (ML) INJECTION AS NEEDED
Status: DISCONTINUED | OUTPATIENT
Start: 2022-01-01 | End: 2022-01-01 | Stop reason: HOSPADM

## 2022-01-01 RX ORDER — IPRATROPIUM BROMIDE AND ALBUTEROL SULFATE 2.5; .5 MG/3ML; MG/3ML
3 SOLUTION RESPIRATORY (INHALATION)
Qty: 360 ML | Refills: 0 | Status: SHIPPED | OUTPATIENT
Start: 2022-01-01 | End: 2022-01-01 | Stop reason: SDUPTHER

## 2022-01-01 RX ORDER — IPRATROPIUM BROMIDE AND ALBUTEROL SULFATE 2.5; .5 MG/3ML; MG/3ML
3 SOLUTION RESPIRATORY (INHALATION)
Qty: 360 ML | Refills: 0 | Status: SHIPPED | OUTPATIENT
Start: 2022-01-01 | End: 2022-07-06

## 2022-01-01 RX ORDER — PREDNISONE 10 MG/1
TABLET ORAL
Qty: 30 TABLET | Refills: 0 | Status: SHIPPED | OUTPATIENT
Start: 2022-01-01 | End: 2022-01-01 | Stop reason: SDUPTHER

## 2022-01-01 RX ORDER — GUAIFENESIN/DEXTROMETHORPHAN 100-10MG/5
10 SYRUP ORAL 4 TIMES DAILY PRN
Qty: 240 ML | Refills: 1 | Status: SHIPPED | OUTPATIENT
Start: 2022-01-01

## 2022-01-01 RX ORDER — DILTIAZEM HYDROCHLORIDE 5 MG/ML
10 INJECTION INTRAVENOUS ONCE
Status: COMPLETED | OUTPATIENT
Start: 2022-01-01 | End: 2022-01-01

## 2022-01-01 RX ORDER — ALBUTEROL SULFATE 2.5 MG/3ML
2.5 SOLUTION RESPIRATORY (INHALATION) EVERY 6 HOURS PRN
Status: DISCONTINUED | OUTPATIENT
Start: 2022-01-01 | End: 2022-01-01

## 2022-01-01 RX ORDER — GUAIFENESIN/DEXTROMETHORPHAN 100-10MG/5
10 SYRUP ORAL 4 TIMES DAILY PRN
Status: DISCONTINUED | OUTPATIENT
Start: 2022-01-01 | End: 2022-01-01 | Stop reason: HOSPADM

## 2022-01-01 RX ORDER — ALBUTEROL SULFATE 90 UG/1
AEROSOL, METERED RESPIRATORY (INHALATION)
Qty: 54 G | Refills: 3 | Status: SHIPPED | OUTPATIENT
Start: 2022-01-01

## 2022-01-01 RX ORDER — FUROSEMIDE 10 MG/ML
40 INJECTION INTRAMUSCULAR; INTRAVENOUS EVERY 12 HOURS
Status: DISCONTINUED | OUTPATIENT
Start: 2022-01-01 | End: 2022-01-01

## 2022-01-01 RX ORDER — LATANOPROST 50 UG/ML
1 SOLUTION/ DROPS OPHTHALMIC NIGHTLY
Status: DISCONTINUED | OUTPATIENT
Start: 2022-01-01 | End: 2022-01-01 | Stop reason: HOSPADM

## 2022-01-01 RX ORDER — ACETAMINOPHEN 325 MG/1
650 TABLET ORAL ONCE AS NEEDED
Status: COMPLETED | OUTPATIENT
Start: 2022-01-01 | End: 2022-01-01

## 2022-01-01 RX ORDER — SODIUM CHLORIDE 0.9 % (FLUSH) 0.9 %
10 SYRINGE (ML) INJECTION EVERY 12 HOURS SCHEDULED
Status: DISCONTINUED | OUTPATIENT
Start: 2022-01-01 | End: 2022-01-01 | Stop reason: HOSPADM

## 2022-01-01 RX ORDER — SERTRALINE HYDROCHLORIDE 25 MG/1
25 TABLET, FILM COATED ORAL DAILY
Status: DISCONTINUED | OUTPATIENT
Start: 2022-01-01 | End: 2022-01-01 | Stop reason: HOSPADM

## 2022-01-01 RX ORDER — CETIRIZINE HYDROCHLORIDE 10 MG/1
10 TABLET ORAL DAILY
COMMUNITY
End: 2022-01-01 | Stop reason: HOSPADM

## 2022-01-01 RX ORDER — IPRATROPIUM BROMIDE AND ALBUTEROL SULFATE 2.5; .5 MG/3ML; MG/3ML
3 SOLUTION RESPIRATORY (INHALATION) ONCE
Status: COMPLETED | OUTPATIENT
Start: 2022-01-01 | End: 2022-01-01

## 2022-01-01 RX ORDER — CETIRIZINE HYDROCHLORIDE 5 MG/1
5 TABLET ORAL DAILY
Status: DISCONTINUED | OUTPATIENT
Start: 2022-01-01 | End: 2022-01-01 | Stop reason: HOSPADM

## 2022-01-01 RX ORDER — CEFUROXIME AXETIL 500 MG/1
500 TABLET ORAL EVERY 12 HOURS SCHEDULED
Status: DISCONTINUED | OUTPATIENT
Start: 2022-01-01 | End: 2022-01-01 | Stop reason: HOSPADM

## 2022-01-01 RX ORDER — FUROSEMIDE 10 MG/ML
20 INJECTION INTRAMUSCULAR; INTRAVENOUS DAILY
Status: DISCONTINUED | OUTPATIENT
Start: 2022-01-01 | End: 2022-01-01 | Stop reason: HOSPADM

## 2022-01-01 RX ORDER — PREDNISONE 10 MG/1
TABLET ORAL
Qty: 30 TABLET | Refills: 0 | Status: SHIPPED | OUTPATIENT
Start: 2022-01-01 | End: 2022-01-01

## 2022-01-01 RX ORDER — IPRATROPIUM BROMIDE AND ALBUTEROL SULFATE 2.5; .5 MG/3ML; MG/3ML
3 SOLUTION RESPIRATORY (INHALATION) EVERY 4 HOURS PRN
Status: DISCONTINUED | OUTPATIENT
Start: 2022-01-01 | End: 2022-01-01 | Stop reason: HOSPADM

## 2022-01-01 RX ORDER — BUDESONIDE AND FORMOTEROL FUMARATE DIHYDRATE 160; 4.5 UG/1; UG/1
2 AEROSOL RESPIRATORY (INHALATION)
Status: DISCONTINUED | OUTPATIENT
Start: 2022-01-01 | End: 2022-01-01 | Stop reason: HOSPADM

## 2022-01-01 RX ORDER — LORAZEPAM 2 MG/ML
0.5 INJECTION INTRAMUSCULAR EVERY 4 HOURS PRN
Status: DISCONTINUED | OUTPATIENT
Start: 2022-01-01 | End: 2022-01-01

## 2022-01-01 RX ORDER — PREDNISONE 20 MG/1
40 TABLET ORAL DAILY
Qty: 10 TABLET | Refills: 0 | Status: SHIPPED | OUTPATIENT
Start: 2022-01-01 | End: 2022-01-01

## 2022-01-01 RX ORDER — NALOXONE HCL 0.4 MG/ML
0.4 VIAL (ML) INJECTION
Status: DISCONTINUED | OUTPATIENT
Start: 2022-01-01 | End: 2022-01-01 | Stop reason: HOSPADM

## 2022-01-01 RX ORDER — DOCUSATE SODIUM 100 MG/1
100 CAPSULE, LIQUID FILLED ORAL 2 TIMES DAILY
Status: DISCONTINUED | OUTPATIENT
Start: 2022-01-01 | End: 2022-01-01 | Stop reason: HOSPADM

## 2022-01-01 RX ORDER — CEFUROXIME AXETIL 500 MG/1
500 TABLET ORAL EVERY 12 HOURS SCHEDULED
Qty: 1 TABLET | Refills: 0 | Status: SHIPPED | OUTPATIENT
Start: 2022-01-01 | End: 2022-01-01 | Stop reason: SDUPTHER

## 2022-01-01 RX ORDER — MORPHINE SULFATE 100 MG/5ML
5 SOLUTION ORAL
Qty: 30 ML | Refills: 0 | Status: SHIPPED | OUTPATIENT
Start: 2022-01-01

## 2022-01-01 RX ORDER — PREDNISONE 20 MG/1
TABLET ORAL
Qty: 49 TABLET | Refills: 0 | Status: SHIPPED | OUTPATIENT
Start: 2022-01-01 | End: 2022-01-01 | Stop reason: HOSPADM

## 2022-01-01 RX ORDER — MELATONIN
1000 DAILY
Status: DISCONTINUED | OUTPATIENT
Start: 2022-01-01 | End: 2022-01-01 | Stop reason: HOSPADM

## 2022-01-01 RX ORDER — IPRATROPIUM BROMIDE AND ALBUTEROL SULFATE 2.5; .5 MG/3ML; MG/3ML
3 SOLUTION RESPIRATORY (INHALATION)
Status: DISCONTINUED | OUTPATIENT
Start: 2022-01-01 | End: 2022-01-01 | Stop reason: HOSPADM

## 2022-01-01 RX ORDER — MORPHINE SULFATE 2 MG/ML
2 INJECTION, SOLUTION INTRAMUSCULAR; INTRAVENOUS EVERY 4 HOURS PRN
Status: ACTIVE | OUTPATIENT
Start: 2022-01-01 | End: 2022-01-01

## 2022-01-01 RX ORDER — PREDNISONE 20 MG/1
60 TABLET ORAL
Status: DISCONTINUED | OUTPATIENT
Start: 2022-01-01 | End: 2022-01-01 | Stop reason: HOSPADM

## 2022-01-01 RX ORDER — MORPHINE SULFATE 2 MG/ML
1 INJECTION, SOLUTION INTRAMUSCULAR; INTRAVENOUS EVERY 6 HOURS PRN
Status: DISCONTINUED | OUTPATIENT
Start: 2022-01-01 | End: 2022-01-01 | Stop reason: HOSPADM

## 2022-01-01 RX ORDER — ACETAMINOPHEN 325 MG/1
650 TABLET ORAL EVERY 6 HOURS PRN
Status: DISCONTINUED | OUTPATIENT
Start: 2022-01-01 | End: 2022-01-01

## 2022-01-01 RX ORDER — TRIAMCINOLONE ACETONIDE 40 MG/ML
80 INJECTION, SUSPENSION INTRA-ARTICULAR; INTRAMUSCULAR ONCE
Status: COMPLETED | OUTPATIENT
Start: 2022-01-01 | End: 2022-01-01

## 2022-01-01 RX ORDER — GUAIFENESIN 600 MG/1
1200 TABLET, EXTENDED RELEASE ORAL EVERY 12 HOURS SCHEDULED
Status: DISCONTINUED | OUTPATIENT
Start: 2022-01-01 | End: 2022-01-01 | Stop reason: HOSPADM

## 2022-01-01 RX ORDER — METHYLPREDNISOLONE SODIUM SUCCINATE 40 MG/ML
40 INJECTION, POWDER, LYOPHILIZED, FOR SOLUTION INTRAMUSCULAR; INTRAVENOUS EVERY 8 HOURS
Status: DISCONTINUED | OUTPATIENT
Start: 2022-01-01 | End: 2022-01-01 | Stop reason: HOSPADM

## 2022-01-01 RX ORDER — HYDRALAZINE HYDROCHLORIDE 20 MG/ML
10 INJECTION INTRAMUSCULAR; INTRAVENOUS EVERY 6 HOURS PRN
Status: DISCONTINUED | OUTPATIENT
Start: 2022-01-01 | End: 2022-01-01 | Stop reason: HOSPADM

## 2022-01-01 RX ORDER — CEFUROXIME AXETIL 500 MG/1
500 TABLET ORAL EVERY 12 HOURS SCHEDULED
Qty: 1 TABLET | Refills: 0 | Status: SHIPPED | OUTPATIENT
Start: 2022-01-01 | End: 2022-01-01

## 2022-01-01 RX ORDER — BENZONATATE 100 MG/1
100 CAPSULE ORAL 3 TIMES DAILY PRN
Qty: 60 CAPSULE | Refills: 0 | Status: SHIPPED | OUTPATIENT
Start: 2022-01-01 | End: 2022-01-01 | Stop reason: HOSPADM

## 2022-01-01 RX ORDER — ASPIRIN 81 MG/1
81 TABLET ORAL DAILY
Status: DISCONTINUED | OUTPATIENT
Start: 2022-01-01 | End: 2022-01-01 | Stop reason: HOSPADM

## 2022-01-01 RX ORDER — POTASSIUM CHLORIDE 1.5 G/1.77G
40 POWDER, FOR SOLUTION ORAL DAILY
Status: DISCONTINUED | OUTPATIENT
Start: 2022-01-01 | End: 2022-01-01

## 2022-01-01 RX ORDER — POTASSIUM CHLORIDE 1.5 G/1.77G
20 POWDER, FOR SOLUTION ORAL ONCE
Status: COMPLETED | OUTPATIENT
Start: 2022-01-01 | End: 2022-01-01

## 2022-01-01 RX ORDER — LISINOPRIL 20 MG/1
20 TABLET ORAL DAILY
Status: DISCONTINUED | OUTPATIENT
Start: 2022-01-01 | End: 2022-01-01 | Stop reason: HOSPADM

## 2022-01-01 RX ORDER — HEPARIN SODIUM 5000 [USP'U]/ML
5000 INJECTION, SOLUTION INTRAVENOUS; SUBCUTANEOUS EVERY 12 HOURS SCHEDULED
Status: DISCONTINUED | OUTPATIENT
Start: 2022-01-01 | End: 2022-01-01

## 2022-01-01 RX ORDER — PREDNISONE 20 MG/1
20 TABLET ORAL DAILY
Qty: 5 TABLET | Refills: 0 | Status: SHIPPED | OUTPATIENT
Start: 2022-01-01 | End: 2022-01-01

## 2022-01-01 RX ORDER — HEPARIN SODIUM 5000 [USP'U]/ML
5000 INJECTION, SOLUTION INTRAVENOUS; SUBCUTANEOUS EVERY 8 HOURS SCHEDULED
Status: DISCONTINUED | OUTPATIENT
Start: 2022-01-01 | End: 2022-01-01

## 2022-01-01 RX ORDER — SERTRALINE HYDROCHLORIDE 25 MG/1
25 TABLET, FILM COATED ORAL DAILY
Qty: 90 TABLET | Refills: 3 | Status: SHIPPED | OUTPATIENT
Start: 2022-01-01

## 2022-01-01 RX ORDER — DOXYCYCLINE 100 MG/1
100 CAPSULE ORAL EVERY 12 HOURS SCHEDULED
Status: DISCONTINUED | OUTPATIENT
Start: 2022-01-01 | End: 2022-01-01 | Stop reason: HOSPADM

## 2022-01-01 RX ORDER — BENZONATATE 100 MG/1
100 CAPSULE ORAL 3 TIMES DAILY PRN
Status: DISCONTINUED | OUTPATIENT
Start: 2022-01-01 | End: 2022-01-01 | Stop reason: HOSPADM

## 2022-01-01 RX ORDER — FUROSEMIDE 10 MG/ML
80 INJECTION INTRAMUSCULAR; INTRAVENOUS ONCE
Status: COMPLETED | OUTPATIENT
Start: 2022-01-01 | End: 2022-01-01

## 2022-01-01 RX ORDER — FUROSEMIDE 20 MG/1
20 TABLET ORAL DAILY
Qty: 30 TABLET | Refills: 2 | Status: SHIPPED | OUTPATIENT
Start: 2022-01-01

## 2022-01-01 RX ORDER — FUROSEMIDE 10 MG/ML
20 INJECTION INTRAMUSCULAR; INTRAVENOUS DAILY
Status: DISCONTINUED | OUTPATIENT
Start: 2022-01-01 | End: 2022-01-01

## 2022-01-01 RX ORDER — DOXYCYCLINE 100 MG/1
100 CAPSULE ORAL EVERY 12 HOURS SCHEDULED
Qty: 1 CAPSULE | Refills: 0 | Status: SHIPPED | OUTPATIENT
Start: 2022-01-01 | End: 2022-01-01

## 2022-01-01 RX ORDER — ONDANSETRON 2 MG/ML
4 INJECTION INTRAMUSCULAR; INTRAVENOUS EVERY 6 HOURS PRN
Status: DISCONTINUED | OUTPATIENT
Start: 2022-01-01 | End: 2022-01-01 | Stop reason: HOSPADM

## 2022-01-01 RX ORDER — NITROGLYCERIN 40 MG/1
1 PATCH TRANSDERMAL DAILY
Status: DISCONTINUED | OUTPATIENT
Start: 2022-01-01 | End: 2022-01-01 | Stop reason: HOSPADM

## 2022-01-01 RX ORDER — METHYLPREDNISOLONE SODIUM SUCCINATE 40 MG/ML
40 INJECTION, POWDER, LYOPHILIZED, FOR SOLUTION INTRAMUSCULAR; INTRAVENOUS EVERY 12 HOURS
Status: DISCONTINUED | OUTPATIENT
Start: 2022-01-01 | End: 2022-01-01

## 2022-01-01 RX ORDER — ACETAMINOPHEN 325 MG/1
650 TABLET ORAL EVERY 4 HOURS PRN
Status: DISCONTINUED | OUTPATIENT
Start: 2022-01-01 | End: 2022-01-01 | Stop reason: HOSPADM

## 2022-01-01 RX ORDER — AZITHROMYCIN 250 MG/1
TABLET, FILM COATED ORAL
Qty: 6 TABLET | Refills: 0 | Status: ON HOLD | OUTPATIENT
Start: 2022-01-01 | End: 2022-01-01

## 2022-01-01 RX ORDER — PREDNISONE 20 MG/1
40 TABLET ORAL
Status: DISCONTINUED | OUTPATIENT
Start: 2022-01-01 | End: 2022-01-01

## 2022-01-01 RX ORDER — DOXYCYCLINE 100 MG/1
100 CAPSULE ORAL EVERY 12 HOURS SCHEDULED
Qty: 1 CAPSULE | Refills: 0 | Status: SHIPPED | OUTPATIENT
Start: 2022-01-01 | End: 2022-01-01 | Stop reason: SDUPTHER

## 2022-01-01 RX ORDER — DOCUSATE SODIUM 100 MG/1
100 CAPSULE, LIQUID FILLED ORAL 2 TIMES DAILY
Qty: 60 CAPSULE | Refills: 11 | Status: SHIPPED | OUTPATIENT
Start: 2022-01-01

## 2022-01-01 RX ORDER — DEXTROSE MONOHYDRATE 50 MG/ML
75 INJECTION, SOLUTION INTRAVENOUS CONTINUOUS
Status: DISCONTINUED | OUTPATIENT
Start: 2022-01-01 | End: 2022-01-01

## 2022-01-01 RX ORDER — ALBUTEROL SULFATE 2.5 MG/3ML
2.5 SOLUTION RESPIRATORY (INHALATION) EVERY 4 HOURS PRN
Status: DISCONTINUED | OUTPATIENT
Start: 2022-01-01 | End: 2022-01-01 | Stop reason: HOSPADM

## 2022-01-01 RX ADMIN — Medication 1000 UNITS: at 08:51

## 2022-01-01 RX ADMIN — BUDESONIDE AND FORMOTEROL FUMARATE DIHYDRATE 2 PUFF: 160; 4.5 AEROSOL RESPIRATORY (INHALATION) at 07:29

## 2022-01-01 RX ADMIN — HEPARIN SODIUM 5000 UNITS: 5000 INJECTION, SOLUTION INTRAVENOUS; SUBCUTANEOUS at 20:53

## 2022-01-01 RX ADMIN — FUROSEMIDE 20 MG: 10 INJECTION, SOLUTION INTRAMUSCULAR; INTRAVENOUS at 09:01

## 2022-01-01 RX ADMIN — DOXYCYCLINE 100 MG: 100 INJECTION, POWDER, LYOPHILIZED, FOR SOLUTION INTRAVENOUS at 09:36

## 2022-01-01 RX ADMIN — Medication 10 ML: at 20:29

## 2022-01-01 RX ADMIN — IPRATROPIUM BROMIDE AND ALBUTEROL SULFATE 3 ML: 2.5; .5 SOLUTION RESPIRATORY (INHALATION) at 11:01

## 2022-01-01 RX ADMIN — DOXYCYCLINE 100 MG: 100 INJECTION, POWDER, LYOPHILIZED, FOR SOLUTION INTRAVENOUS at 09:53

## 2022-01-01 RX ADMIN — Medication 10 ML: at 20:32

## 2022-01-01 RX ADMIN — IPRATROPIUM BROMIDE AND ALBUTEROL SULFATE 3 ML: 2.5; .5 SOLUTION RESPIRATORY (INHALATION) at 07:10

## 2022-01-01 RX ADMIN — SERTRALINE HYDROCHLORIDE 25 MG: 25 TABLET ORAL at 09:47

## 2022-01-01 RX ADMIN — Medication 1000 UNITS: at 08:55

## 2022-01-01 RX ADMIN — IPRATROPIUM BROMIDE AND ALBUTEROL SULFATE 3 ML: 2.5; .5 SOLUTION RESPIRATORY (INHALATION) at 07:11

## 2022-01-01 RX ADMIN — GUAIFENESIN 1200 MG: 600 TABLET, EXTENDED RELEASE ORAL at 08:13

## 2022-01-01 RX ADMIN — SODIUM CHLORIDE 500 ML: 9 INJECTION, SOLUTION INTRAVENOUS at 11:33

## 2022-01-01 RX ADMIN — CEFUROXIME AXETIL 500 MG: 500 TABLET ORAL at 10:06

## 2022-01-01 RX ADMIN — LATANOPROST 1 DROP: 50 SOLUTION OPHTHALMIC at 20:54

## 2022-01-01 RX ADMIN — NITROGLYCERIN 1 PATCH: 0.2 PATCH TRANSDERMAL at 09:04

## 2022-01-01 RX ADMIN — LATANOPROST 1 DROP: 50 SOLUTION OPHTHALMIC at 20:30

## 2022-01-01 RX ADMIN — METOPROLOL TARTRATE 12.5 MG: 25 TABLET, FILM COATED ORAL at 21:02

## 2022-01-01 RX ADMIN — METOPROLOL TARTRATE 12.5 MG: 25 TABLET, FILM COATED ORAL at 20:38

## 2022-01-01 RX ADMIN — Medication 10 ML: at 08:52

## 2022-01-01 RX ADMIN — TRIAMCINOLONE ACETONIDE 80 MG: 40 INJECTION, SUSPENSION INTRA-ARTICULAR; INTRAMUSCULAR at 11:31

## 2022-01-01 RX ADMIN — METHYLPREDNISOLONE SODIUM SUCCINATE 40 MG: 40 INJECTION, POWDER, FOR SOLUTION INTRAMUSCULAR; INTRAVENOUS at 05:31

## 2022-01-01 RX ADMIN — METHYLPREDNISOLONE SODIUM SUCCINATE 40 MG: 40 INJECTION, POWDER, FOR SOLUTION INTRAMUSCULAR; INTRAVENOUS at 05:24

## 2022-01-01 RX ADMIN — Medication 1000 UNITS: at 09:47

## 2022-01-01 RX ADMIN — IPRATROPIUM BROMIDE AND ALBUTEROL SULFATE 3 ML: 2.5; .5 SOLUTION RESPIRATORY (INHALATION) at 15:38

## 2022-01-01 RX ADMIN — METHYLPREDNISOLONE SODIUM SUCCINATE 40 MG: 40 INJECTION, POWDER, FOR SOLUTION INTRAMUSCULAR; INTRAVENOUS at 23:47

## 2022-01-01 RX ADMIN — AZITHROMYCIN MONOHYDRATE 500 MG: 500 INJECTION, POWDER, LYOPHILIZED, FOR SOLUTION INTRAVENOUS at 10:59

## 2022-01-01 RX ADMIN — IPRATROPIUM BROMIDE AND ALBUTEROL SULFATE 3 ML: 2.5; .5 SOLUTION RESPIRATORY (INHALATION) at 14:30

## 2022-01-01 RX ADMIN — BUDESONIDE AND FORMOTEROL FUMARATE DIHYDRATE 2 PUFF: 160; 4.5 AEROSOL RESPIRATORY (INHALATION) at 07:22

## 2022-01-01 RX ADMIN — CETIRIZINE HYDROCHLORIDE 5 MG: 5 TABLET ORAL at 08:55

## 2022-01-01 RX ADMIN — POTASSIUM CHLORIDE 40 MEQ: 1.5 POWDER, FOR SOLUTION ORAL at 08:20

## 2022-01-01 RX ADMIN — IPRATROPIUM BROMIDE AND ALBUTEROL SULFATE 3 ML: 2.5; .5 SOLUTION RESPIRATORY (INHALATION) at 07:56

## 2022-01-01 RX ADMIN — LATANOPROST 1 DROP: 50 SOLUTION OPHTHALMIC at 20:31

## 2022-01-01 RX ADMIN — CETIRIZINE HYDROCHLORIDE 5 MG: 5 TABLET ORAL at 09:04

## 2022-01-01 RX ADMIN — METOPROLOL TARTRATE 25 MG: 25 TABLET, FILM COATED ORAL at 21:11

## 2022-01-01 RX ADMIN — METOPROLOL TARTRATE 25 MG: 25 TABLET, FILM COATED ORAL at 20:00

## 2022-01-01 RX ADMIN — IPRATROPIUM BROMIDE AND ALBUTEROL SULFATE 3 ML: 2.5; .5 SOLUTION RESPIRATORY (INHALATION) at 07:12

## 2022-01-01 RX ADMIN — IPRATROPIUM BROMIDE AND ALBUTEROL SULFATE 3 ML: 2.5; .5 SOLUTION RESPIRATORY (INHALATION) at 07:13

## 2022-01-01 RX ADMIN — Medication 10 ML: at 09:00

## 2022-01-01 RX ADMIN — METHYLPREDNISOLONE SODIUM SUCCINATE 40 MG: 40 INJECTION, POWDER, FOR SOLUTION INTRAMUSCULAR; INTRAVENOUS at 14:16

## 2022-01-01 RX ADMIN — CETIRIZINE HYDROCHLORIDE 5 MG: 5 TABLET ORAL at 10:05

## 2022-01-01 RX ADMIN — SERTRALINE HYDROCHLORIDE 25 MG: 25 TABLET ORAL at 10:05

## 2022-01-01 RX ADMIN — FUROSEMIDE 20 MG: 10 INJECTION, SOLUTION INTRAMUSCULAR; INTRAVENOUS at 13:24

## 2022-01-01 RX ADMIN — LATANOPROST 1 DROP: 50 SOLUTION OPHTHALMIC at 21:02

## 2022-01-01 RX ADMIN — NITROGLYCERIN 1 PATCH: 0.2 PATCH TRANSDERMAL at 09:01

## 2022-01-01 RX ADMIN — ASPIRIN 81 MG: 81 TABLET, FILM COATED ORAL at 10:05

## 2022-01-01 RX ADMIN — METOPROLOL TARTRATE 12.5 MG: 25 TABLET, FILM COATED ORAL at 21:28

## 2022-01-01 RX ADMIN — DOXYCYCLINE 100 MG: 100 INJECTION, POWDER, LYOPHILIZED, FOR SOLUTION INTRAVENOUS at 21:11

## 2022-01-01 RX ADMIN — IPRATROPIUM BROMIDE AND ALBUTEROL SULFATE 3 ML: 2.5; .5 SOLUTION RESPIRATORY (INHALATION) at 19:16

## 2022-01-01 RX ADMIN — GUAIFENESIN 1200 MG: 600 TABLET, EXTENDED RELEASE ORAL at 21:28

## 2022-01-01 RX ADMIN — BUDESONIDE AND FORMOTEROL FUMARATE DIHYDRATE 2 PUFF: 160; 4.5 AEROSOL RESPIRATORY (INHALATION) at 11:42

## 2022-01-01 RX ADMIN — Medication 10 ML: at 08:22

## 2022-01-01 RX ADMIN — DOXYCYCLINE 100 MG: 100 CAPSULE ORAL at 11:47

## 2022-01-01 RX ADMIN — SERTRALINE HYDROCHLORIDE 25 MG: 25 TABLET ORAL at 08:55

## 2022-01-01 RX ADMIN — Medication 1000 UNITS: at 09:04

## 2022-01-01 RX ADMIN — GUAIFENESIN SYRUP AND DEXTROMETHORPHAN 10 ML: 100; 10 SYRUP ORAL at 20:30

## 2022-01-01 RX ADMIN — METOPROLOL TARTRATE 25 MG: 25 TABLET, FILM COATED ORAL at 08:51

## 2022-01-01 RX ADMIN — IPRATROPIUM BROMIDE AND ALBUTEROL SULFATE 3 ML: 2.5; .5 SOLUTION RESPIRATORY (INHALATION) at 06:51

## 2022-01-01 RX ADMIN — PERFLUTREN 1.5 ML: 6.52 INJECTION, SUSPENSION INTRAVENOUS at 09:35

## 2022-01-01 RX ADMIN — HEPARIN SODIUM 5000 UNITS: 5000 INJECTION, SOLUTION INTRAVENOUS; SUBCUTANEOUS at 13:07

## 2022-01-01 RX ADMIN — METHYLPREDNISOLONE SODIUM SUCCINATE 40 MG: 40 INJECTION, POWDER, FOR SOLUTION INTRAMUSCULAR; INTRAVENOUS at 21:27

## 2022-01-01 RX ADMIN — CEFTRIAXONE SODIUM 2 G: 2 INJECTION, POWDER, FOR SOLUTION INTRAMUSCULAR; INTRAVENOUS at 09:29

## 2022-01-01 RX ADMIN — FUROSEMIDE 20 MG: 10 INJECTION, SOLUTION INTRAMUSCULAR; INTRAVENOUS at 08:51

## 2022-01-01 RX ADMIN — TRIAMCINOLONE ACETONIDE 80 MG: 40 INJECTION, SUSPENSION INTRA-ARTICULAR; INTRAMUSCULAR at 10:22

## 2022-01-01 RX ADMIN — DOXYCYCLINE 100 MG: 100 CAPSULE ORAL at 10:06

## 2022-01-01 RX ADMIN — METHYLPREDNISOLONE SODIUM SUCCINATE 40 MG: 40 INJECTION, POWDER, FOR SOLUTION INTRAMUSCULAR; INTRAVENOUS at 13:24

## 2022-01-01 RX ADMIN — Medication 10 ML: at 08:55

## 2022-01-01 RX ADMIN — ALBUTEROL SULFATE 2.5 MG: 2.5 SOLUTION RESPIRATORY (INHALATION) at 02:41

## 2022-01-01 RX ADMIN — METOPROLOL TARTRATE 12.5 MG: 25 TABLET, FILM COATED ORAL at 08:17

## 2022-01-01 RX ADMIN — LISINOPRIL 20 MG: 20 TABLET ORAL at 08:12

## 2022-01-01 RX ADMIN — DILTIAZEM HYDROCHLORIDE 10 MG: 5 INJECTION, SOLUTION INTRAVENOUS at 04:49

## 2022-01-01 RX ADMIN — LORAZEPAM 0.5 MG: 2 INJECTION INTRAMUSCULAR; INTRAVENOUS at 13:06

## 2022-01-01 RX ADMIN — IPRATROPIUM BROMIDE AND ALBUTEROL SULFATE 3 ML: 2.5; .5 SOLUTION RESPIRATORY (INHALATION) at 19:40

## 2022-01-01 RX ADMIN — CEFTRIAXONE SODIUM 1 G: 1 INJECTION, POWDER, FOR SOLUTION INTRAMUSCULAR; INTRAVENOUS at 08:59

## 2022-01-01 RX ADMIN — DOCUSATE SODIUM 100 MG: 100 CAPSULE, LIQUID FILLED ORAL at 08:16

## 2022-01-01 RX ADMIN — IPRATROPIUM BROMIDE AND ALBUTEROL SULFATE 3 ML: 2.5; .5 SOLUTION RESPIRATORY (INHALATION) at 07:29

## 2022-01-01 RX ADMIN — PREDNISONE 40 MG: 20 TABLET ORAL at 08:13

## 2022-01-01 RX ADMIN — DOXYCYCLINE 100 MG: 100 INJECTION, POWDER, LYOPHILIZED, FOR SOLUTION INTRAVENOUS at 20:30

## 2022-01-01 RX ADMIN — SERTRALINE HYDROCHLORIDE 25 MG: 25 TABLET ORAL at 08:59

## 2022-01-01 RX ADMIN — IPRATROPIUM BROMIDE AND ALBUTEROL SULFATE 3 ML: 2.5; .5 SOLUTION RESPIRATORY (INHALATION) at 15:24

## 2022-01-01 RX ADMIN — HEPARIN SODIUM 5000 UNITS: 5000 INJECTION, SOLUTION INTRAVENOUS; SUBCUTANEOUS at 15:06

## 2022-01-01 RX ADMIN — ASPIRIN 81 MG: 81 TABLET, FILM COATED ORAL at 08:51

## 2022-01-01 RX ADMIN — GUAIFENESIN 1200 MG: 600 TABLET, EXTENDED RELEASE ORAL at 21:02

## 2022-01-01 RX ADMIN — BENZONATATE 100 MG: 100 CAPSULE ORAL at 20:30

## 2022-01-01 RX ADMIN — IPRATROPIUM BROMIDE AND ALBUTEROL SULFATE 3 ML: 2.5; .5 SOLUTION RESPIRATORY (INHALATION) at 11:30

## 2022-01-01 RX ADMIN — LATANOPROST 1 DROP: 50 SOLUTION OPHTHALMIC at 20:42

## 2022-01-01 RX ADMIN — METOPROLOL TARTRATE 2.5 MG: 5 INJECTION INTRAVENOUS at 15:19

## 2022-01-01 RX ADMIN — METHYLPREDNISOLONE SODIUM SUCCINATE 40 MG: 40 INJECTION, POWDER, FOR SOLUTION INTRAMUSCULAR; INTRAVENOUS at 23:45

## 2022-01-01 RX ADMIN — IPRATROPIUM BROMIDE AND ALBUTEROL SULFATE 3 ML: 2.5; .5 SOLUTION RESPIRATORY (INHALATION) at 14:46

## 2022-01-01 RX ADMIN — LATANOPROST 1 DROP: 50 SOLUTION OPHTHALMIC at 23:57

## 2022-01-01 RX ADMIN — HEPARIN SODIUM 5000 UNITS: 5000 INJECTION, SOLUTION INTRAVENOUS; SUBCUTANEOUS at 14:14

## 2022-01-01 RX ADMIN — METOPROLOL TARTRATE 12.5 MG: 25 TABLET, FILM COATED ORAL at 08:34

## 2022-01-01 RX ADMIN — SERTRALINE HYDROCHLORIDE 25 MG: 25 TABLET ORAL at 09:04

## 2022-01-01 RX ADMIN — Medication 10 ML: at 21:29

## 2022-01-01 RX ADMIN — SERTRALINE HYDROCHLORIDE 25 MG: 25 TABLET ORAL at 08:13

## 2022-01-01 RX ADMIN — PREDNISONE 40 MG: 20 TABLET ORAL at 08:20

## 2022-01-01 RX ADMIN — IPRATROPIUM BROMIDE AND ALBUTEROL SULFATE 3 ML: 2.5; .5 SOLUTION RESPIRATORY (INHALATION) at 20:51

## 2022-01-01 RX ADMIN — Medication 10 ML: at 08:17

## 2022-01-01 RX ADMIN — HEPARIN SODIUM 5000 UNITS: 5000 INJECTION, SOLUTION INTRAVENOUS; SUBCUTANEOUS at 05:39

## 2022-01-01 RX ADMIN — BUDESONIDE AND FORMOTEROL FUMARATE DIHYDRATE 2 PUFF: 160; 4.5 AEROSOL RESPIRATORY (INHALATION) at 20:51

## 2022-01-01 RX ADMIN — FUROSEMIDE 20 MG: 10 INJECTION, SOLUTION INTRAMUSCULAR; INTRAVENOUS at 08:33

## 2022-01-01 RX ADMIN — GUAIFENESIN 1200 MG: 600 TABLET, EXTENDED RELEASE ORAL at 08:34

## 2022-01-01 RX ADMIN — IPRATROPIUM BROMIDE AND ALBUTEROL SULFATE 3 ML: 2.5; .5 SOLUTION RESPIRATORY (INHALATION) at 19:58

## 2022-01-01 RX ADMIN — Medication 1000 UNITS: at 09:01

## 2022-01-01 RX ADMIN — Medication 10 ML: at 22:17

## 2022-01-01 RX ADMIN — ASPIRIN 81 MG: 81 TABLET, FILM COATED ORAL at 14:10

## 2022-01-01 RX ADMIN — LISINOPRIL 20 MG: 20 TABLET ORAL at 20:38

## 2022-01-01 RX ADMIN — METHYLPREDNISOLONE SODIUM SUCCINATE 40 MG: 40 INJECTION, POWDER, FOR SOLUTION INTRAMUSCULAR; INTRAVENOUS at 11:15

## 2022-01-01 RX ADMIN — FUROSEMIDE 20 MG: 10 INJECTION, SOLUTION INTRAMUSCULAR; INTRAVENOUS at 08:17

## 2022-01-01 RX ADMIN — IOPAMIDOL 56 ML: 755 INJECTION, SOLUTION INTRAVENOUS at 06:01

## 2022-01-01 RX ADMIN — METHYLPREDNISOLONE SODIUM SUCCINATE 40 MG: 40 INJECTION, POWDER, FOR SOLUTION INTRAMUSCULAR; INTRAVENOUS at 11:44

## 2022-01-01 RX ADMIN — SERTRALINE HYDROCHLORIDE 25 MG: 25 TABLET ORAL at 08:34

## 2022-01-01 RX ADMIN — DOCUSATE SODIUM 100 MG: 100 CAPSULE, LIQUID FILLED ORAL at 08:13

## 2022-01-01 RX ADMIN — HEPARIN SODIUM 5000 UNITS: 5000 INJECTION, SOLUTION INTRAVENOUS; SUBCUTANEOUS at 05:04

## 2022-01-01 RX ADMIN — Medication 10 ML: at 21:03

## 2022-01-01 RX ADMIN — IPRATROPIUM BROMIDE AND ALBUTEROL SULFATE 3 ML: 2.5; .5 SOLUTION RESPIRATORY (INHALATION) at 14:13

## 2022-01-01 RX ADMIN — SERTRALINE HYDROCHLORIDE 25 MG: 25 TABLET ORAL at 20:38

## 2022-01-01 RX ADMIN — METOPROLOL TARTRATE 12.5 MG: 25 TABLET, FILM COATED ORAL at 08:21

## 2022-01-01 RX ADMIN — DOXYCYCLINE 100 MG: 100 CAPSULE ORAL at 20:00

## 2022-01-01 RX ADMIN — NITROGLYCERIN 1 PATCH: 0.2 PATCH TRANSDERMAL at 10:43

## 2022-01-01 RX ADMIN — CEFTRIAXONE SODIUM 1 G: 1 INJECTION, POWDER, FOR SOLUTION INTRAMUSCULAR; INTRAVENOUS at 08:55

## 2022-01-01 RX ADMIN — FUROSEMIDE 80 MG: 10 INJECTION, SOLUTION INTRAVENOUS at 15:37

## 2022-01-01 RX ADMIN — AZITHROMYCIN MONOHYDRATE 500 MG: 500 INJECTION, POWDER, LYOPHILIZED, FOR SOLUTION INTRAVENOUS at 12:52

## 2022-01-01 RX ADMIN — METHYLPREDNISOLONE SODIUM SUCCINATE 40 MG: 40 INJECTION, POWDER, FOR SOLUTION INTRAMUSCULAR; INTRAVENOUS at 23:52

## 2022-01-01 RX ADMIN — METOPROLOL TARTRATE 25 MG: 25 TABLET, FILM COATED ORAL at 09:47

## 2022-01-01 RX ADMIN — Medication 1000 UNITS: at 08:59

## 2022-01-01 RX ADMIN — CETIRIZINE HYDROCHLORIDE 5 MG: 5 TABLET ORAL at 08:51

## 2022-01-01 RX ADMIN — GUAIFENESIN 1200 MG: 600 TABLET, EXTENDED RELEASE ORAL at 20:41

## 2022-01-01 RX ADMIN — HEPARIN SODIUM 5000 UNITS: 5000 INJECTION, SOLUTION INTRAVENOUS; SUBCUTANEOUS at 21:31

## 2022-01-01 RX ADMIN — BUDESONIDE AND FORMOTEROL FUMARATE DIHYDRATE 2 PUFF: 160; 4.5 AEROSOL RESPIRATORY (INHALATION) at 19:58

## 2022-01-01 RX ADMIN — METOPROLOL TARTRATE 25 MG: 25 TABLET, FILM COATED ORAL at 20:29

## 2022-01-01 RX ADMIN — IPRATROPIUM BROMIDE AND ALBUTEROL SULFATE 3 ML: 2.5; .5 SOLUTION RESPIRATORY (INHALATION) at 19:47

## 2022-01-01 RX ADMIN — IPRATROPIUM BROMIDE AND ALBUTEROL SULFATE 3 ML: 2.5; .5 SOLUTION RESPIRATORY (INHALATION) at 11:29

## 2022-01-01 RX ADMIN — FUROSEMIDE 20 MG: 10 INJECTION, SOLUTION INTRAMUSCULAR; INTRAVENOUS at 09:47

## 2022-01-01 RX ADMIN — IPRATROPIUM BROMIDE AND ALBUTEROL SULFATE 3 ML: 2.5; .5 SOLUTION RESPIRATORY (INHALATION) at 14:57

## 2022-01-01 RX ADMIN — LATANOPROST 1 DROP: 50 SOLUTION OPHTHALMIC at 20:01

## 2022-01-01 RX ADMIN — LISINOPRIL 20 MG: 20 TABLET ORAL at 08:17

## 2022-01-01 RX ADMIN — SERTRALINE HYDROCHLORIDE 25 MG: 25 TABLET ORAL at 08:21

## 2022-01-01 RX ADMIN — LATANOPROST 1 DROP: 50 SOLUTION OPHTHALMIC at 20:43

## 2022-01-01 RX ADMIN — IPRATROPIUM BROMIDE AND ALBUTEROL SULFATE 3 ML: 2.5; .5 SOLUTION RESPIRATORY (INHALATION) at 11:42

## 2022-01-01 RX ADMIN — Medication 10 ML: at 21:12

## 2022-01-01 RX ADMIN — IPRATROPIUM BROMIDE AND ALBUTEROL SULFATE 3 ML: 2.5; .5 SOLUTION RESPIRATORY (INHALATION) at 08:01

## 2022-01-01 RX ADMIN — DILTIAZEM HYDROCHLORIDE 10 MG: 5 INJECTION, SOLUTION INTRAVENOUS at 06:01

## 2022-01-01 RX ADMIN — CEFTRIAXONE SODIUM 1 G: 1 INJECTION, POWDER, FOR SOLUTION INTRAMUSCULAR; INTRAVENOUS at 08:51

## 2022-01-01 RX ADMIN — IPRATROPIUM BROMIDE AND ALBUTEROL SULFATE 3 ML: 2.5; .5 SOLUTION RESPIRATORY (INHALATION) at 11:28

## 2022-01-01 RX ADMIN — IPRATROPIUM BROMIDE AND ALBUTEROL SULFATE 3 ML: 2.5; .5 SOLUTION RESPIRATORY (INHALATION) at 19:55

## 2022-01-01 RX ADMIN — METOPROLOL TARTRATE 25 MG: 25 TABLET, FILM COATED ORAL at 20:53

## 2022-01-01 RX ADMIN — CETIRIZINE HYDROCHLORIDE 5 MG: 5 TABLET ORAL at 09:47

## 2022-01-01 RX ADMIN — CEFTRIAXONE SODIUM 1 G: 1 INJECTION, POWDER, FOR SOLUTION INTRAMUSCULAR; INTRAVENOUS at 09:47

## 2022-01-01 RX ADMIN — ALBUTEROL SULFATE 2.5 MG: 2.5 SOLUTION RESPIRATORY (INHALATION) at 03:56

## 2022-01-01 RX ADMIN — Medication 10 ML: at 08:15

## 2022-01-01 RX ADMIN — DOXYCYCLINE 100 MG: 100 INJECTION, POWDER, LYOPHILIZED, FOR SOLUTION INTRAVENOUS at 22:26

## 2022-01-01 RX ADMIN — PREDNISONE 60 MG: 20 TABLET ORAL at 10:06

## 2022-01-01 RX ADMIN — Medication 10 ML: at 20:53

## 2022-01-01 RX ADMIN — DOXYCYCLINE 100 MG: 100 INJECTION, POWDER, LYOPHILIZED, FOR SOLUTION INTRAVENOUS at 20:53

## 2022-01-01 RX ADMIN — IPRATROPIUM BROMIDE AND ALBUTEROL SULFATE 3 ML: 2.5; .5 SOLUTION RESPIRATORY (INHALATION) at 16:42

## 2022-01-01 RX ADMIN — METOPROLOL TARTRATE 25 MG: 25 TABLET, FILM COATED ORAL at 10:05

## 2022-01-01 RX ADMIN — ACETAMINOPHEN 650 MG: 325 TABLET ORAL at 03:12

## 2022-01-01 RX ADMIN — MORPHINE SULFATE 1 MG: 2 INJECTION, SOLUTION INTRAMUSCULAR; INTRAVENOUS at 13:24

## 2022-01-01 RX ADMIN — Medication 10 ML: at 08:34

## 2022-01-01 RX ADMIN — POTASSIUM CHLORIDE 20 MEQ: 1.5 POWDER, FOR SOLUTION ORAL at 05:24

## 2022-01-01 RX ADMIN — CETIRIZINE HYDROCHLORIDE 5 MG: 5 TABLET ORAL at 09:01

## 2022-01-01 RX ADMIN — IPRATROPIUM BROMIDE AND ALBUTEROL SULFATE 3 ML: 2.5; .5 SOLUTION RESPIRATORY (INHALATION) at 13:38

## 2022-01-01 RX ADMIN — Medication 10 ML: at 20:38

## 2022-01-01 RX ADMIN — DOCUSATE SODIUM 100 MG: 100 CAPSULE, LIQUID FILLED ORAL at 20:38

## 2022-01-01 RX ADMIN — CEFTRIAXONE SODIUM 1 G: 1 INJECTION, POWDER, FOR SOLUTION INTRAMUSCULAR; INTRAVENOUS at 09:01

## 2022-01-01 RX ADMIN — IPRATROPIUM BROMIDE AND ALBUTEROL SULFATE 3 ML: 2.5; .5 SOLUTION RESPIRATORY (INHALATION) at 08:27

## 2022-01-01 RX ADMIN — METHYLPREDNISOLONE SODIUM SUCCINATE 40 MG: 40 INJECTION, POWDER, FOR SOLUTION INTRAMUSCULAR; INTRAVENOUS at 20:41

## 2022-01-01 RX ADMIN — GUAIFENESIN 1200 MG: 600 TABLET, EXTENDED RELEASE ORAL at 08:17

## 2022-01-01 RX ADMIN — DEXTROSE MONOHYDRATE 75 ML/HR: 50 INJECTION, SOLUTION INTRAVENOUS at 23:07

## 2022-01-01 RX ADMIN — BENZONATATE 100 MG: 100 CAPSULE ORAL at 21:11

## 2022-01-01 RX ADMIN — LISINOPRIL 20 MG: 20 TABLET ORAL at 08:34

## 2022-01-01 RX ADMIN — IPRATROPIUM BROMIDE AND ALBUTEROL SULFATE 3 ML: 2.5; .5 SOLUTION RESPIRATORY (INHALATION) at 19:21

## 2022-01-01 RX ADMIN — LATANOPROST 1 DROP: 50 SOLUTION OPHTHALMIC at 21:31

## 2022-01-01 RX ADMIN — DOCUSATE SODIUM 100 MG: 100 CAPSULE, LIQUID FILLED ORAL at 20:41

## 2022-01-01 RX ADMIN — Medication 10 ML: at 09:01

## 2022-01-01 RX ADMIN — IPRATROPIUM BROMIDE AND ALBUTEROL SULFATE 3 ML: 2.5; .5 SOLUTION RESPIRATORY (INHALATION) at 14:50

## 2022-01-01 RX ADMIN — METOPROLOL TARTRATE 12.5 MG: 25 TABLET, FILM COATED ORAL at 20:41

## 2022-01-01 RX ADMIN — METHYLPREDNISOLONE SODIUM SUCCINATE 40 MG: 40 INJECTION, POWDER, FOR SOLUTION INTRAMUSCULAR; INTRAVENOUS at 13:12

## 2022-01-01 RX ADMIN — DOXYCYCLINE 100 MG: 100 INJECTION, POWDER, LYOPHILIZED, FOR SOLUTION INTRAVENOUS at 10:13

## 2022-01-01 RX ADMIN — CEFUROXIME AXETIL 500 MG: 500 TABLET ORAL at 11:47

## 2022-01-01 RX ADMIN — CETIRIZINE HYDROCHLORIDE 5 MG: 5 TABLET ORAL at 08:58

## 2022-01-01 RX ADMIN — IPRATROPIUM BROMIDE AND ALBUTEROL SULFATE 3 ML: 2.5; .5 SOLUTION RESPIRATORY (INHALATION) at 14:31

## 2022-01-01 RX ADMIN — Medication 10 ML: at 09:47

## 2022-01-01 RX ADMIN — GUAIFENESIN 1200 MG: 600 TABLET, EXTENDED RELEASE ORAL at 08:21

## 2022-01-01 RX ADMIN — GUAIFENESIN 1200 MG: 600 TABLET, EXTENDED RELEASE ORAL at 20:38

## 2022-01-01 RX ADMIN — ASPIRIN 81 MG: 81 TABLET, FILM COATED ORAL at 09:01

## 2022-01-01 RX ADMIN — SERTRALINE HYDROCHLORIDE 25 MG: 25 TABLET ORAL at 09:01

## 2022-01-01 RX ADMIN — LATANOPROST 1 DROP: 50 SOLUTION OPHTHALMIC at 21:11

## 2022-01-01 RX ADMIN — IPRATROPIUM BROMIDE AND ALBUTEROL SULFATE 3 ML: 2.5; .5 SOLUTION RESPIRATORY (INHALATION) at 19:34

## 2022-01-01 RX ADMIN — IPRATROPIUM BROMIDE AND ALBUTEROL SULFATE 3 ML: 2.5; .5 SOLUTION RESPIRATORY (INHALATION) at 11:23

## 2022-01-01 RX ADMIN — IPRATROPIUM BROMIDE AND ALBUTEROL SULFATE 3 ML: 2.5; .5 SOLUTION RESPIRATORY (INHALATION) at 20:56

## 2022-01-01 RX ADMIN — SERTRALINE HYDROCHLORIDE 25 MG: 25 TABLET ORAL at 08:17

## 2022-01-01 RX ADMIN — DOCUSATE SODIUM 100 MG: 100 CAPSULE, LIQUID FILLED ORAL at 08:21

## 2022-01-01 RX ADMIN — ACETAMINOPHEN 650 MG: 325 TABLET ORAL at 15:13

## 2022-01-01 RX ADMIN — Medication 1000 UNITS: at 10:05

## 2022-01-01 RX ADMIN — IPRATROPIUM BROMIDE AND ALBUTEROL SULFATE 3 ML: 2.5; .5 SOLUTION RESPIRATORY (INHALATION) at 05:25

## 2022-01-01 RX ADMIN — SODIUM ZIRCONIUM CYCLOSILICATE 5 G: 5 POWDER, FOR SUSPENSION ORAL at 10:41

## 2022-01-01 RX ADMIN — LISINOPRIL 20 MG: 20 TABLET ORAL at 08:20

## 2022-01-01 RX ADMIN — IPRATROPIUM BROMIDE AND ALBUTEROL SULFATE 3 ML: 2.5; .5 SOLUTION RESPIRATORY (INHALATION) at 07:18

## 2022-01-01 RX ADMIN — POTASSIUM CHLORIDE 40 MEQ: 1.5 POWDER, FOR SOLUTION ORAL at 08:13

## 2022-01-01 RX ADMIN — METHYLPREDNISOLONE SODIUM SUCCINATE 40 MG: 40 INJECTION, POWDER, FOR SOLUTION INTRAMUSCULAR; INTRAVENOUS at 00:19

## 2022-01-01 RX ADMIN — ASPIRIN 81 MG: 81 TABLET, FILM COATED ORAL at 09:04

## 2022-01-01 RX ADMIN — METHYLPREDNISOLONE SODIUM SUCCINATE 40 MG: 40 INJECTION, POWDER, FOR SOLUTION INTRAMUSCULAR; INTRAVENOUS at 11:01

## 2022-01-01 RX ADMIN — CEFUROXIME AXETIL 500 MG: 500 TABLET ORAL at 20:00

## 2022-01-01 RX ADMIN — PREDNISONE 60 MG: 20 TABLET ORAL at 09:04

## 2022-01-01 RX ADMIN — METOPROLOL TARTRATE 12.5 MG: 25 TABLET, FILM COATED ORAL at 08:12

## 2022-01-01 RX ADMIN — METHYLPREDNISOLONE SODIUM SUCCINATE 40 MG: 40 INJECTION, POWDER, FOR SOLUTION INTRAMUSCULAR; INTRAVENOUS at 15:06

## 2022-01-01 RX ADMIN — Medication 10 ML: at 22:27

## 2022-01-01 RX ADMIN — SERTRALINE HYDROCHLORIDE 25 MG: 25 TABLET ORAL at 08:51

## 2022-01-01 RX ADMIN — DEXTROSE MONOHYDRATE 75 ML/HR: 50 INJECTION, SOLUTION INTRAVENOUS at 12:24

## 2022-01-01 RX ADMIN — METOPROLOL TARTRATE 25 MG: 25 TABLET, FILM COATED ORAL at 09:04

## 2022-01-01 RX ADMIN — DOCUSATE SODIUM 100 MG: 100 CAPSULE, LIQUID FILLED ORAL at 21:02

## 2022-01-01 RX ADMIN — FUROSEMIDE 40 MG: 10 INJECTION, SOLUTION INTRAVENOUS at 05:12

## 2022-01-01 RX ADMIN — METOPROLOL TARTRATE 25 MG: 25 TABLET, FILM COATED ORAL at 09:01

## 2022-01-01 RX ADMIN — IPRATROPIUM BROMIDE AND ALBUTEROL SULFATE 3 ML: 2.5; .5 SOLUTION RESPIRATORY (INHALATION) at 15:32

## 2022-03-24 NOTE — PROGRESS NOTES
" Subjective   Tamara Singh is a 81 y.o. female.     Chief Complaint   Patient presents with   • URI   • Cough   • COPD             History of Present Illness     Symptoms over past couple of days.  Gentleman with her today visited her and she was worse. She has not been using her neb treatments.     Review of Systems   Constitutional: Negative for chills, fatigue and fever.   HENT: Negative for congestion, ear discharge, ear pain, facial swelling, hearing loss, postnasal drip, rhinorrhea, sinus pressure, sore throat, trouble swallowing and voice change.    Eyes: Negative for discharge, redness and visual disturbance.   Respiratory: Positive for cough and shortness of breath. Negative for chest tightness and wheezing.    Cardiovascular: Negative for chest pain and palpitations.   Gastrointestinal: Negative for abdominal pain, blood in stool, constipation, diarrhea, nausea and vomiting.   Endocrine: Negative for polydipsia and polyuria.   Genitourinary: Negative for dysuria, flank pain, hematuria and urgency.   Musculoskeletal: Negative for arthralgias, back pain, joint swelling and myalgias.   Skin: Negative for rash.   Neurological: Negative for dizziness, weakness, numbness and headaches.   Hematological: Negative for adenopathy.   Psychiatric/Behavioral: Negative for confusion and sleep disturbance. The patient is not nervous/anxious.            /79 (BP Location: Left arm, Patient Position: Sitting, Cuff Size: Adult)   Pulse 107   Temp 99.5 °F (37.5 °C) (Infrared)   Ht 149.9 cm (59.02\")   Wt 39.7 kg (87 lb 8 oz)   SpO2 100%   BMI 17.66 kg/m²       Objective     Physical Exam  Vitals and nursing note reviewed.   Constitutional:       Appearance: Normal appearance. She is well-developed.   HENT:      Head: Normocephalic and atraumatic.      Right Ear: External ear normal.      Left Ear: External ear normal.      Nose: Nose normal. No rhinorrhea.   Eyes:      General: No scleral icterus.     " Extraocular Movements: Extraocular movements intact.      Conjunctiva/sclera: Conjunctivae normal.      Pupils: Pupils are equal, round, and reactive to light.   Cardiovascular:      Rate and Rhythm: Normal rate and regular rhythm.      Heart sounds:     No friction rub. No gallop.   Pulmonary:      Effort: Pulmonary effort is normal.      Breath sounds: Wheezing and rhonchi present. No rales.   Abdominal:      General: Bowel sounds are normal. There is no distension.      Palpations: Abdomen is soft.      Tenderness: There is no abdominal tenderness.   Musculoskeletal:         General: No deformity. Normal range of motion.      Cervical back: Normal range of motion and neck supple.   Skin:     General: Skin is warm and dry.      Findings: No erythema or rash.   Neurological:      Mental Status: She is alert and oriented to person, place, and time.      Cranial Nerves: No cranial nerve deficit.   Psychiatric:         Behavior: Behavior normal.         Thought Content: Thought content normal.         Judgment: Judgment normal.             PAST MEDICAL HISTORY     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 Biopsy taken. Normal stomach. Normal duodenum.)   2014 GURPREET BHAT    • ENDOSCOPY  2014    EGD w/ tube 50014 (Esophagitis seen. Biopsy taken. No evidence of esophageal ca. Gastritis in stomach. Biopsy taken. Normal duodenum.)   • ENDOSCOPY  10/02/2013    EGD w/ tube 99605 (Normal esopahgus, stoamch, & duodenum. No evidence of tumor. Biopsy taken.)   • ENDOSCOPY  2013    EGD w/ tube 11354 (Tumor in distal 3rd of esophagus. Biopsy taken. Lesion runs from 28 cms to 34 cms. Normal stomach & duodenum.)   • INJECTION OF MEDICATION  2016    Kenalog (5)      • OTHER SURGICAL HISTORY  12/10/2014    Drain/Inject Major Joint  (Shoulder joint pain)    • OTHER SURGICAL HISTORY      OCT DISC NFL 20968 (Primary open angle glaucoma (2): 2016, 2015   • OTHER SURGICAL HISTORY  2016    OPTIC NERVE HEAD EVAL PERFORMED  (Primary open angle glaucoma)       SOCIAL HISTORY     Social History     Socioeconomic History   • Marital status:    Tobacco Use   • Smoking status: Former Smoker     Packs/day: 1.00     Years: 59.00     Pack years: 59.00     Types: Cigarettes     Start date: 1955     Quit date: 2014     Years since quittin.2   • Smokeless tobacco: Never Used   Substance and Sexual Activity   • Alcohol use: No   • Drug use: No   • Sexual activity: Defer      ALLERGIES   Patient has no known allergies.   MEDICATIONS     Current Outpatient Medications   Medication Sig Dispense Refill   • albuterol sulfate  (90 Base) MCG/ACT inhaler INHALE 2 PUFFS BY MOUTH EVERY 6 HOURS AS NEEDED FOR WHEEZING 54 g 3   • Cholecalciferol 19723 UNITS capsule Take 1 capsule by mouth daily.      • Combivent Respimat  MCG/ACT inhaler INHALE 1 PUFF BY MOUTH 4 TIMES DAILY 12 g 11   • furosemide (LASIX) 40 MG tablet Take 1 tablet by mouth Daily As Needed (SWELLING). 90 tablet 3   • ipratropium-albuterol (DUO-NEB) 0.5-2.5 mg/3 ml nebulizer Take 3 mL by nebulization 4 (Four) Times a Day. 360 mL 12   • latanoprost (XALATAN) 0.005 % ophthalmic solution INSTILL 1 DROP INTO EACH EYE AT BEDTIME     • lisinopril (PRINIVIL,ZESTRIL) 20 MG tablet Take 1 tablet by mouth once daily 90 tablet 3   • loratadine (Claritin) 10 MG tablet Take 1 tablet by mouth Daily. 90 tablet 3   • sertraline (ZOLOFT) 25 MG tablet Take 1 tablet by mouth Daily. 90 tablet 3   • azithromycin (Zithromax) 250 MG tablet Take 2 tablets the first day, then 1 tablet daily for 4 days. 6 tablet 0   • benzonatate (Tessalon Perles) 100 MG capsule Take 1 capsule by mouth 3 (Three) Times a Day As Needed for Cough. 60 capsule 0   • clotrimazole (LOTRIMIN) 1 % cream Apply  topically to the appropriate area as directed 2 (Two) Times a Day. 15 g 11   • guaiFENesin-dextromethorphan (ROBITUSSIN DM) 100-10 MG/5ML syrup Take 10 mL by mouth 4 (Four) Times a Day As Needed for Cough. 240 mL 1     No current facility-administered medications for this visit.        The following portions of the patient's history were reviewed and updated as appropriate: allergies, current medications, past family history, past medical history, past social history, past surgical history and problem list.        Assessment/Plan   Diagnoses and all orders for this visit:    1. COPD with exacerbation (HCC) (Primary)  -     triamcinolone acetonide (KENALOG-40) injection 80 mg    2. Chest congestion  -     COVID-19, [facility] IN-HOUSE, NP SWAB IN TRANSPORT MEDIA 8-10 HR TAT - Swab, Oropharynx; Future  -     ipratropium-albuterol (DUO-NEB) nebulizer solution 3 mL    3. Chronic obstructive pulmonary disease, unspecified COPD type (HCC)  -     ipratropium-albuterol (DUO-NEB) nebulizer  solution 3 mL    Other orders  -     azithromycin (Zithromax) 250 MG tablet; Take 2 tablets the first day, then 1 tablet daily for 4 days.  Dispense: 6 tablet; Refill: 0  -     benzonatate (Tessalon Perles) 100 MG capsule; Take 1 capsule by mouth 3 (Three) Times a Day As Needed for Cough.  Dispense: 60 capsule; Refill: 0  -     guaiFENesin-dextromethorphan (ROBITUSSIN DM) 100-10 MG/5ML syrup; Take 10 mL by mouth 4 (Four) Times a Day As Needed for Cough.  Dispense: 240 mL; Refill: 1    I listened to lungs after I gave her a duoneb. Moving air well but some low pitch wheeze, maybe little rhonchi.     She is to use her nebs qid.                  No follow-ups on file.                  This document has been electronically signed by Jean Pierre Scanlon MD on March 24, 2022 11:34 CDT

## 2022-05-30 PROBLEM — J18.9 PNEUMONIA OF LEFT LOWER LOBE DUE TO INFECTIOUS ORGANISM: Status: ACTIVE | Noted: 2022-01-01

## 2022-06-01 PROBLEM — E43 SEVERE MALNUTRITION (HCC): Status: ACTIVE | Noted: 2022-01-01

## 2022-06-01 NOTE — PROGRESS NOTES
[facility]  INPATIENT PROGRESS NOTE    Length of Stay: 1  Date of Admission: 5/30/2022  Primary Care Physician: Jean Pierre Scanlon MD    Subjective   Chief Complaint: Cough, shortness of breath    HPI: Patient continues to have cough and generalized weakness. She denies fevers or chills.  She has been having tachycardia initially telemetry reported V. tach which was actually sinus tachycardia.    Review of Systems   Constitutional: Positive for fatigue. Negative for activity change, appetite change, chills and fever.   HENT: Negative for congestion, ear pain, rhinorrhea, sore throat and trouble swallowing.    Respiratory: Positive for cough and shortness of breath. Negative for chest tightness and wheezing.    Cardiovascular: Negative for chest pain, palpitations and leg swelling.   Gastrointestinal: Negative for abdominal distention, abdominal pain, diarrhea, nausea and vomiting.   Genitourinary: Negative for difficulty urinating, dysuria and hematuria.   Musculoskeletal: Negative for arthralgias, back pain and myalgias.   Skin: Negative for pallor and rash.   Neurological: Positive for weakness. Negative for dizziness, syncope, light-headedness and headaches.   Hematological: Negative for adenopathy. Does not bruise/bleed easily.   Psychiatric/Behavioral: Negative for agitation and confusion. The patient is not nervous/anxious.      Objective    Temp:  [97.2 °F (36.2 °C)-97.8 °F (36.6 °C)] 97.8 °F (36.6 °C)  Heart Rate:  [104-120] 117  Resp:  [17-22] 18  BP: (102-113)/(56-68) 107/58    Physical Exam  Constitutional:       General: She is not in acute distress.     Appearance: She is ill-appearing. She is not diaphoretic.      Comments: Thin and chronically ill-appearing, malnourished elderly woman.   HENT:      Head: Normocephalic and atraumatic.      Right Ear: External ear normal.      Left Ear: External ear normal.      Nose: No congestion or rhinorrhea.       Mouth/Throat:      Mouth: Mucous membranes are moist.      Pharynx: No oropharyngeal exudate or posterior oropharyngeal erythema.   Eyes:      General: No scleral icterus.     Extraocular Movements: Extraocular movements intact.      Conjunctiva/sclera: Conjunctivae normal.   Cardiovascular:      Rate and Rhythm: Normal rate and regular rhythm.      Heart sounds: Normal heart sounds. No murmur heard.  Pulmonary:      Effort: Pulmonary effort is normal. No respiratory distress.      Breath sounds: Rales present. No wheezing or rhonchi.      Comments: Coarse crepitations in lung bases with reduced breath sounds globally.  Abdominal:      General: Abdomen is flat. There is no distension.      Palpations: Abdomen is soft.      Tenderness: There is no abdominal tenderness. There is no guarding.   Musculoskeletal:         General: No swelling, tenderness or deformity.      Cervical back: Neck supple. No rigidity. No muscular tenderness.      Right lower leg: No edema.      Left lower leg: No edema.   Lymphadenopathy:      Cervical: No cervical adenopathy.   Skin:     General: Skin is warm and dry.   Neurological:      General: No focal deficit present.      Mental Status: She is alert and oriented to person, place, and time.      Cranial Nerves: No cranial nerve deficit.      Motor: No weakness.   Psychiatric:         Mood and Affect: Mood normal.         Behavior: Behavior normal.         Thought Content: Thought content normal.       Medication Review:    Current Facility-Administered Medications:     acetaminophen (TYLENOL) tablet 650 mg, 650 mg, Oral, Q4H PRN, Marlys Mims MD, 650 mg at 05/31/22 1513    albuterol (PROVENTIL) nebulizer solution 0.083% 2.5 mg/3mL, 2.5 mg, Nebulization, Q4H PRN, Marlys Mims MD, 2.5 mg at 06/01/22 0241    azithromycin (ZITHROMAX) 500 mg in sodium chloride 0.9 % 250 mL IVPB, 500 mg, Intravenous, Once, Marlys Mims MD, Restarted at 05/30/22 1133    benzonatate  (TESSALON) capsule 100 mg, 100 mg, Oral, TID PRN, Marlys Mims MD, 100 mg at 05/31/22 2030    cefTRIAXone (ROCEPHIN) 1 g/100 mL 0.9% NS (MBP), 1 g, Intravenous, Q24H, Marlys Mims MD, Last Rate: 0 mL/hr at 05/31/22 1120, 1 g at 06/01/22 0855    cetirizine (zyrTEC) tablet 5 mg, 5 mg, Oral, Daily, Marlys Mims MD, 5 mg at 06/01/22 0855    cholecalciferol (VITAMIN D3) tablet 1,000 Units, 1,000 Units, Oral, Daily, Marlys Mims MD, 1,000 Units at 06/01/22 0855    doxycycline (VIBRAMYCIN) 100 mg/100 mL 0.9% NS MBP, 100 mg, Intravenous, Q12H, Marlys Mims MD    guaiFENesin-dextromethorphan (ROBITUSSIN DM) 100-10 MG/5ML syrup 10 mL, 10 mL, Oral, 4x Daily PRN, Marlys Mims MD, 10 mL at 05/31/22 2030    heparin (porcine) 5000 UNIT/ML injection 5,000 Units, 5,000 Units, Subcutaneous, Q8H, Marlys Mims MD, 5,000 Units at 06/01/22 1307    ipratropium-albuterol (DUO-NEB) nebulizer solution 3 mL, 3 mL, Nebulization, 4x Daily - RT, Marlys Mims MD, 3 mL at 06/01/22 0651    latanoprost (XALATAN) 0.005 % ophthalmic solution 1 drop, 1 drop, Both Eyes, Nightly, Marlys Mims MD, 1 drop at 05/31/22 2031    LORazepam (ATIVAN) injection 0.5 mg, 0.5 mg, Intravenous, Q4H PRN, Marlys Mims MD, 0.5 mg at 06/01/22 1306    methylPREDNISolone sodium succinate (SOLU-Medrol) injection 40 mg, 40 mg, Intravenous, Q12H, Marlys Mims MD, 40 mg at 06/01/22 1101    metoprolol tartrate (LOPRESSOR) tablet 25 mg, 25 mg, Oral, Q12H, Marlys Mims MD    morphine injection 2 mg, 2 mg, Intravenous, Q4H PRN **AND** naloxone (NARCAN) injection 0.4 mg, 0.4 mg, Intravenous, Q5 Min PRN, Marlys Mims MD    ondansetron (ZOFRAN) injection 4 mg, 4 mg, Intravenous, Q6H PRN, Marlys Mims MD    sertraline (ZOLOFT) tablet 25 mg, 25 mg, Oral, Daily, Marlys Mims MD, 25 mg at 06/01/22 0855    sodium chloride 0.9 % flush 10 mL, 10 mL, Intravenous, PRN, Marlys Mims  MD    sodium chloride 0.9 % flush 10 mL, 10 mL, Intravenous, Q12H, Marlys Mims MD, 10 mL at 06/01/22 0855    sodium chloride 0.9 % flush 10 mL, 10 mL, Intravenous, PRN, Marlys Mims MD    I have reviewed the patient's current medications.     Results Review:  I have reviewed the labs, radiology results, and diagnostic studies.    Laboratory Data:   Results from last 7 days   Lab Units 06/01/22  0530 05/31/22  0537 05/30/22  0421   SODIUM mmol/L 138 138 136   POTASSIUM mmol/L 4.8 4.2 4.2   CHLORIDE mmol/L 102 102 99   CO2 mmol/L 23.0 21.0* 25.0   BUN mg/dL 58* 45* 34*   CREATININE mg/dL 1.71* 1.44* 1.46*   GLUCOSE mg/dL 153* 120* 185*   CALCIUM mg/dL 10.0 9.4 9.3   BILIRUBIN mg/dL  --   --  0.4   ALK PHOS U/L  --   --  206*   ALT (SGPT) U/L  --   --  28   AST (SGOT) U/L  --   --  61*   ANION GAP mmol/L 13.0 15.0 12.0     Estimated Creatinine Clearance: 15.9 mL/min (A) (by C-G formula based on SCr of 1.71 mg/dL (H)).  Results from last 7 days   Lab Units 06/01/22  1518 05/30/22  0421   MAGNESIUM mg/dL 2.6* 2.1         Results from last 7 days   Lab Units 06/01/22  0530 05/31/22  0537 05/30/22  0421   WBC 10*3/mm3 12.68* 14.87* 11.60*   HEMOGLOBIN g/dL 10.1* 10.6* 10.1*   HEMATOCRIT % 31.8* 33.2* 30.8*   PLATELETS 10*3/mm3 340 285 260           Culture Data:   Blood Culture   Date Value Ref Range Status   05/30/2022 No growth at 2 days  Preliminary   05/30/2022 No growth at 2 days  Preliminary     No results found for: URINECX  Respiratory Culture   Date Value Ref Range Status   05/30/2022 Rejected  Final     No results found for: WOUNDCX  No results found for: STOOLCX  No components found for: BODYFLD    Radiology Data:   Imaging Results (Last 24 Hours)       ** No results found for the last 24 hours. **            Assessment/Plan     Hospital Problem List:  Active Problems:    Pneumonia of left lower lobe due to infectious organism    Severe malnutrition (HCC)  Severe chronic disease related  Malnutrition  COPD  Chronic respiratory failure with hypoxia on 3 L of home oxygen  Essential hypertension  History of esophageal cancer s/p chemoradiation  History of glaucoma  CKD  Elevated troponin     Plan  Continue IV antibiotic with ceftriaxone and azithromycin. Continue oxygen by nasal cannula and titrate to keep O2 sat greater than 92%. Follow blood cultures.     Continue bronchodilator nebulizer treatments.  Continue IV Solu-Medrol 40 mg every 12 hours.  Check    Patient has elevated troponin which is likely related to her tachycardia, CKD and pneumonia.  Will check echocardiogram and monitor troponins.     Monitor renal function given CKD history.  Hold lisinopril given infection and borderline blood pressures.  Restart IV Lasix 20 mg daily tomorrow.    PT/OT for disposition planning     DVT prophylaxis with subcutaneous heparin     CODE STATUS is DNR/DNI    Discharge Planning: In progress    I confirmed that the patient's Advance Care Plan is present, code status is documented, or surrogate decision maker is listed in the patient's medical record.      I have utilized all available immediate resources to obtain, update, or review the patient's current medications.      Marlys Mims MD   06/01/22   17:05 CDT

## 2022-06-01 NOTE — SIGNIFICANT NOTE
Approx 1515 this RN received a phone call from tele stating patient was in VTACH. Patient was found in bed shaking and pulling at her gown and tele monitor. Dr. Hopkins made aware and orders placed. EKG obtained. Metoprolol push administered.

## 2022-06-01 NOTE — PLAN OF CARE
Goal Outcome Evaluation: Patient very anxious in bed throughout shift. Ativan prn ordered, and administered. New 20g IV started to right AC. Alert and oriented. No complaints of pain. VS stable. Family at bedside.

## 2022-06-01 NOTE — THERAPY EVALUATION
Acute Care - Occupational Therapy Initial Evaluation  [facility]     Patient Name: Tamara Singh  : 1940  MRN: [id number]  Today's Date: 2022     Date of Referral to OT: 22       Admit Date: 2022       ICD-10-CM ICD-9-CM   1. Pneumonia of left lower lobe due to infectious organism  J18.9 486   2. Acute on chronic respiratory failure with hypoxia and hypercapnia (HCC)  J96.21 518.84    J96.22 786.09     799.02   3. Dysphagia, unspecified type  R13.10 787.20   4. Impaired functional mobility, balance, gait, and endurance  Z74.09 V49.89   5. Impaired mobility and activities of daily living  Z74.09 V49.89    Z78.9      Patient Active Problem List   Diagnosis   • Nuclear sclerosis   • Artificial lens present   • Borderline glaucoma   • Corneal endothelial dystrophy   • Pseudophakia   • Cortical age-related cataract   • Nuclear cataract   • History of esophageal cancer   • Acute kidney injury (HCC)   • Pneumonia of left lower lobe due to infectious organism     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)    • Nuclear cataract    • Perleche    • Pneumathemia (HCC)     UNSPECIFIED ORGANISM   • Shoulder joint pain    • Upper respiratory infection    • Vitamin D deficiency    • Wheezing      Past Surgical History:   Procedure Laterality Date   • CATARACT EXTRACTION  2015    Remove cataract, insert lens (Left  eye.)   • CHOLECYSTECTOMY  07/14/2004   • COLONOSCOPY  05/23/2013    Colon endoscopy 88914 (Internal & external hemorrhoids found.)   • ENDOSCOPY  11/26/2014    EGD w/ biopsy 61562 (Normal esophagus. Biopsy taken. Normal stomach. Normal duodenum.)   11/26/2014 GURPREET BHAT    • ENDOSCOPY  03/19/2014    EGD w/ tube 35894 (Esophagitis seen. Biopsy taken. No evidence of esophageal ca. Gastritis in stomach. Biopsy taken. Normal duodenum.)   • ENDOSCOPY  10/02/2013    EGD w/ tube 98028 (Normal esopahgus, stoamch, & duodenum. No evidence of tumor. Biopsy taken.)   • ENDOSCOPY  05/23/2013    EGD w/ tube 34179 (Tumor in distal 3rd of esophagus. Biopsy taken. Lesion runs from 28 cms to 34 cms. Normal stomach & duodenum.)   • INJECTION OF MEDICATION  01/25/2016    Kenalog (5)      • OTHER SURGICAL HISTORY  12/10/2014    Drain/Inject Major Joint 20610 (Shoulder joint pain)    • OTHER SURGICAL HISTORY      OCT DISC NFL 74541 (Primary open angle glaucoma (2): 7/26/2016, 6/16/2015   • OTHER SURGICAL HISTORY  07/26/2016    OPTIC NERVE HEAD EVAL PERFORMED 2027F (Primary open angle glaucoma)          OT ASSESSMENT FLOWSHEET (last 12 hours)     OT Evaluation and Treatment     Row Name 06/01/22 0953                   OT Time and Intention    Subjective Information complains of;pain  -RB        Document Type evaluation  -RB        Mode of Treatment co-treatment;occupational therapy;physical therapy  -RB                  General Information    Patient Profile Reviewed yes  -RB        Prior Level of Function independent:;all household mobility;home management;ADL's;dependent:;driving  -RB        Existing Precautions/Restrictions fall;oxygen therapy device and L/min  -RB                  Previous Level of Function/Home Environm    Bathing/Grooming, Premorbid Functional Level independent  -RB        Dressing, Premorbid Functional Level independent  -RB        Eating/Feeding, Premorbid Functional Level independent  -RB        Toileting,  Premorbid Functional Level independent  -RB                  Living Environment    People in Home alone  -RB                  Stairs Within Home, Primary    Number of Stairs, Within Home, Primary none  -RB                  Pain Assessment    Pretreatment Pain Rating 4/10  -RB        Posttreatment Pain Rating 4/10  -RB        Pain Location - throat  -RB                  Cognition    Orientation Status (Cognition) oriented x 4  -RB                  Range of Motion Comprehensive    General Range of Motion bilateral upper extremity ROM WNL  -RB                  Strength Comprehensive (MMT)    General Manual Muscle Testing (MMT) Assessment other (see comments)  -RB        Comment, General Manual Muscle Testing (MMT) Assessment B UE strengthwas 4/5 grossly  -RB                  Sensory Assessment (Somatosensory)    Sensory Assessment (Somatosensory) UE sensation intact  -RB                  Activities of Daily Living    BADL Assessment/Intervention lower body dressing;toileting  -RB                  Lower Body Dressing Assessment/Training    [place] Level (Lower Body Dressing) lower body dressing skills;doff;don;socks;contact guard assist  -RB        Position (Lower Body Dressing) edge of bed sitting  -RB                  Toileting Assessment/Training    [place] Level (Toileting) toileting skills;contact guard assist  -RB        Assistive Devices (Toileting) grab bar/safety frame;raised toilet seat  -RB                  Bed Mobility    Bed Mobility --  -RB                  Functional Mobility    Functional Mobility- Ind. Level contact guard assist  -RB        Functional Mobility- Device walker, front-wheeled  -RB        Functional Mobility-Distance (Feet) 30  -RB        Functional Mobility- Safety Issues balance decreased during turns;step length decreased;supplemental O2  -RB                  Transfer Assessment/Treatment    Transfers toilet transfer  -RB                  Transfers    Sit-Stand [place]  (Transfers) contact guard  -RB        [place] Level (Toilet Transfer) contact guard  -RB        Assistive Device (Toilet Transfer) grab bars/safety frame;raised toilet seat;walker, front-wheeled  -RB                  Sit-Stand Transfer    Assistive Device (Sit-Stand Transfers) walker, front-wheeled  -RB                  Toilet Transfer    Type (Toilet Transfer) sit-stand;stand-sit  -RB                  Safety Issues, Functional Mobility    Impairments Affecting Function (Mobility) strength;endurance/activity tolerance  -RB                  Plan of Care Review    Plan of Care Reviewed With patient  -RB                  Vital Signs    Pre Systolic BP Rehab 114  -RB        Pre Treatment Diastolic BP 58  -RB        Post Systolic BP Rehab 124  -RB        Post Treatment Diastolic BP 64  -RB        Pretreatment Heart Rate (beats/min) 120  -RB        Posttreatment Heart Rate (beats/min) 123  -RB        Pre SpO2 (%) 100  -RB        O2 Delivery Pre Treatment supplemental O2  2 l  -RB        Post SpO2 (%) 100  -RB        O2 Delivery Post Treatment supplemental O2  -RB        Pre Patient Position Sitting  -RB        Intra Patient Position Standing  -RB        Post Patient Position Sitting  -RB                  Positioning and Restraints    Pre-Treatment Position sitting in chair/recliner  -RB                  Therapy Assessment/Plan (OT)    Date of Referral to OT 05/31/22  -RB        Functional Level at Time of Evaluation (OT) Imp mob and ADLs.  -RB        OT Diagnosis Imp mob and ADLs.  -RB        Rehab Potential (OT) good, to achieve stated therapy goals  -RB        Criteria for Skilled Therapeutic Interventions Met (OT) yes;meets criteria  -RB        Therapy Frequency (OT) other (see comments)  3-7 days/wk  -RB        Predicted Duration of Therapy Intervention (OT) Until D/C or goals met.  -RB        Problem List (OT) problems related to;balance;coordination;mobility;inability to direct their own care;strength  -RB         Activity Limitations Related to Problem List (OT) unable to ambulate safely;unable to transfer safely;BADLs not performed adequately or safely;IADLs not performed adequately or safely  -RB        Planned Therapy Interventions (OT) activity tolerance training;adaptive equipment training;BADL retraining;functional balance retraining;occupation/activity based interventions;patient/caregiver education/training;ROM/therapeutic exercise;strengthening exercise;transfer/mobility retraining  -RB                  Evaluation Complexity (OT)    Review Occupational Profile/Medical/Therapy History Complexity expanded/moderate complexity  -RB        Assessment, Occupational Performance/Identification of Deficit Complexity 3-5 performance deficits  -RB        Clinical Decision Making Complexity (OT) detailed assessment/moderate complexity  -RB        Overall Complexity of Evaluation (OT) moderate complexity  -RB                  OT Goals    Transfer Goal Selection (OT) transfer, OT goal 1  -RB        Bathing Goal Selection (OT) bathing, OT goal 1  -RB        Dressing Goal Selection (OT) dressing, OT goal 1  -RB        Toileting Goal Selection (OT) toileting, OT goal 1  -RB                  Transfer Goal 1 (OT)    Activity/Assistive Device (Transfer Goal 1, OT) transfers, all  -RB        [place] Level/Cues Needed (Transfer Goal 1, OT) modified independence  -RB        Time Frame (Transfer Goal 1, OT) long term goal (LTG)  -RB        Progress/Outcome (Transfer Goal 1, OT) goal not met  -RB                  Bathing Goal 1 (OT)    Activity/Device (Bathing Goal 1, OT) bathing skills, all  -RB        [place] Level/Cues Needed (Bathing Goal 1, OT) supervision required  -RB        Time Frame (Bathing Goal 1, OT) long term goal (LTG)  -RB        Progress/Outcomes (Bathing Goal 1, OT) goal not met  -RB                  Dressing Goal 1 (OT)    Activity/Device (Dressing Goal 1, OT) dressing skills, all  -RB         [place]/Cues Needed (Dressing Goal 1, OT) modified independence  -RB        Time Frame (Dressing Goal 1, OT) long term goal (LTG)  -RB        Progress/Outcome (Dressing Goal 1, OT) goal not met  -RB                  Toileting Goal 1 (OT)    Activity/Device (Toileting Goal 1, OT) toileting skills, all  -RB        [place] Level/Cues Needed (Toileting Goal 1, OT) modified independence  -RB        Time Frame (Toileting Goal 1, OT) long term goal (LTG)  -RB        Progress/Outcome (Toileting Goal 1, OT) goal not met  -RB              User Key  (r) = Recorded By, (t) = Taken By, (c) = Cosigned By    Initials Name Effective Dates    RB Hung Bonds, SATISH 06/16/21 -                  Occupational Therapy Education                 Title: PT OT SLP Therapies (In Progress)     Topic: Occupational Therapy (In Progress)     Point: ADL training (Not Started)     Description:   Instruct learner(s) on proper safety adaptation and remediation techniques during self care or transfers.   Instruct in proper use of assistive devices.              Learner Progress:  Not documented in this visit.          Point: Home exercise program (Not Started)     Description:   Instruct learner(s) on appropriate technique for monitoring, assisting and/or progressing therapeutic exercises/activities.              Learner Progress:  Not documented in this visit.          Point: Precautions (Done)     Description:   Instruct learner(s) on prescribed precautions during self-care and functional transfers.              Learning Progress Summary           Patient Acceptance, E, VU by RB at 6/1/2022 9309    Comment: Edu pt on use of gait belt and non skid socks when OOB and no OOB without assist.                   Point: Body mechanics (Not Started)     Description:   Instruct learner(s) on proper positioning and spine alignment during self-care, functional mobility activities and/or exercises.              Learner Progress:  Not documented in this  visit.                      User Key     Initials Effective Dates Name Provider Type Discipline    RB 06/16/21 -  Hung Bonds, SATISH Occupational Therapist OT                  OT Recommendation and Plan  Planned Therapy Interventions (OT): activity tolerance training, adaptive equipment training, BADL retraining, functional balance retraining, occupation/activity based interventions, patient/caregiver education/training, ROM/therapeutic exercise, strengthening exercise, transfer/mobility retraining  Therapy Frequency (OT): other (see comments) (3-7 days/wk)  Plan of Care Review  Plan of Care Reviewed With: patient  Outcome Evaluation: OT jefferson on this date as co-eval with PT.  Pt was CGA for sit to stand to sit and toilet transfer.  She was CGA for LB dressing.  Pt ambulated ~30' with CGA and R/W.  She could benefit from OT services to increase safety and independence with ADLs and functional mobility.  Rec home health therapy when D/C from hospital.  Plan of Care Reviewed With: patient  Outcome Evaluation: OT jefferson on this date as co-eval with PT.  Pt was CGA for sit to stand to sit and toilet transfer.  She was CGA for LB dressing.  Pt ambulated ~30' with CGA and R/W.  She could benefit from OT services to increase safety and independence with ADLs and functional mobility.  Rec home health therapy when D/C from hospital.     Outcome Measures     Row Name 06/01/22 0920             How much help from another is currently needed...    Putting on and taking off regular lower body clothing? 3  -RB      Bathing (including washing, rinsing, and drying) 2  -RB      Toileting (which includes using toilet bed pan or urinal) 3  -RB      Putting on and taking off regular upper body clothing 3  -RB      Taking care of personal grooming (such as brushing teeth) 4  -RB      Eating meals 4  -RB      AM-PAC 6 Clicks Score (OT) 19  -RB              Functional Assessment    Outcome Measure Options AM-PAC 6 Clicks Daily Activity (OT)   -RB            User Key  (r) = Recorded By, (t) = Taken By, (c) = Cosigned By    Initials Name Provider Type    RB Hung Bonds OT Occupational Therapist                Time Calculation:    Time Calculation- OT     Row Name 06/01/22 1201             Time Calculation- OT    OT Start Time 0920  -RB      OT Stop Time 0954  -RB      OT Time Calculation (min) 34 min  -RB      OT Received On 06/01/22  -RB      OT Goal Re-Cert Due Date 06/14/22  -RB            User Key  (r) = Recorded By, (t) = Taken By, (c) = Cosigned By    Initials Name Provider Type    Hung Jamison OT Occupational Therapist              Therapy Charges for Today     Code Description Service Date Service Provider Modifiers Qty    [id number] HC OT EVAL MOD COMPLEXITY 2 6/1/2022 Hung Bonds OT GO 1               Hung Bonds OT  6/1/2022

## 2022-06-01 NOTE — PLAN OF CARE
Goal Outcome Evaluation:  Plan of Care Reviewed With: patient           Outcome Evaluation: Initial PT evaluation complete, co-evaluation with OT.  Patient is alert and cooperative.  She requires CGA with transfers and gait, ambulating 15'x2 with FWW, slow gabo, cues for proper use of walker, distance limited by dyspnea though SpO2 100% on 2 LPM. Patient lives alone but has relatives close by.  Recommend home with assistance and HHPT to increase patient's activity tolerance and lower fall risk.  Goals established, continue skilled I/P PT.

## 2022-06-01 NOTE — PLAN OF CARE
Goal Outcome Evaluation:           Progress: no change  Outcome Evaluation: Patient is still short of breath while resting in bed;still on oxygen at 2LPM via nasal cannula;dangle on the side of the bed; productive cough with occasional phlegm; breathing treatment per RT;on fall precautions;no complaints of pain

## 2022-06-01 NOTE — PLAN OF CARE
Goal Outcome Evaluation:  Plan of Care Reviewed With: patient           Outcome Evaluation: OT eval on this date as co-eval with PT.  Pt was CGA for sit to stand to sit and toilet transfer.  She was CGA for LB dressing.  Pt ambulated ~30' with CGA and R/W.  She could benefit from OT services to increase safety and independence with ADLs and functional mobility.  Rec home health therapy when D/C from hospital.

## 2022-06-01 NOTE — THERAPY EVALUATION
Patient Name: Tamara Singh  : 1940    MRN: [id number]                              Today's Date: 2022       Admit Date: 2022    Visit Dx:     ICD-10-CM ICD-9-CM   1. Pneumonia of left lower lobe due to infectious organism  J18.9 486   2. Acute on chronic respiratory failure with hypoxia and hypercapnia (HCC)  J96.21 518.84    J96.22 786.09     799.02   3. Dysphagia, unspecified type  R13.10 787.20   4. Impaired functional mobility, balance, gait, and endurance  Z74.09 V49.89     Patient Active Problem List   Diagnosis   • Nuclear sclerosis   • Artificial lens present   • Borderline glaucoma   • Corneal endothelial dystrophy   • Pseudophakia   • Cortical age-related cataract   • Nuclear cataract   • History of esophageal cancer   • Acute kidney injury (HCC)   • Pneumonia of left lower lobe due to infectious organism     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 Biopsy taken. Normal stomach. Normal duodenum.)   11/26/2014 GURPREET BHAT    • ENDOSCOPY  03/19/2014    EGD w/ tube 51794 (Esophagitis seen. Biopsy taken. No evidence of esophageal ca. Gastritis in stomach. Biopsy taken. Normal duodenum.)   • ENDOSCOPY  10/02/2013    EGD w/ tube 34987 (Normal esopahgus, stoamch, & duodenum. No evidence of tumor. Biopsy taken.)   • ENDOSCOPY  05/23/2013    EGD w/ tube 62159 (Tumor in distal 3rd of esophagus. Biopsy taken. Lesion runs from 28 cms to 34 cms. Normal stomach & duodenum.)   • INJECTION OF MEDICATION  01/25/2016    Kenalog (5)      • OTHER SURGICAL HISTORY  12/10/2014    Drain/Inject Major Joint 20610 (Shoulder joint pain)    • OTHER SURGICAL HISTORY      OCT DISC NFL 55082 (Primary open angle glaucoma (2): 7/26/2016, 6/16/2015   • OTHER SURGICAL HISTORY  07/26/2016    OPTIC NERVE HEAD EVAL PERFORMED 2027F (Primary open angle glaucoma)       General Information     Row Name 06/01/22 0915          Physical Therapy Time and Intention    Document Type evaluation  -CZ     Mode of Treatment co-treatment;occupational therapy;physical therapy  -CZ     Row Name 06/01/22 0915          General Information    Patient Profile Reviewed yes  -CZ     Prior Level of Function independent:;all household mobility  -CZ     Existing Precautions/Restrictions fall;oxygen therapy device and L/min  -CZ     Barriers to Rehab previous functional deficit  -CZ     Row Name 06/01/22 0915          Living Environment    People in Home alone  -CZ     Row Name 06/01/22 0915          Home Main Entrance    Number of Stairs, Main Entrance other (see comments)  -CZ     Row Name 06/01/22 0915          Stairs Within Home, Primary    Stairs, Within Home, Primary Ambulates with SPC or FWW. Sponge bathes. Tub/shower, no seat. Ramp to enter. Lives alone, nephew lives next door, family checks on patient daily.  -CZ     Number of Stairs, Within Home, Primary none  -CZ      Row Name 06/01/22 0915          Cognition    Orientation Status (Cognition) oriented x 4  -CZ     Row Name 06/01/22 0915          Safety Issues, Functional Mobility    Impairments Affecting Function (Mobility) strength;endurance/activity tolerance  -CZ           User Key  (r) = Recorded By, (t) = Taken By, (c) = Cosigned By    Initials Name Provider Type    CZ David Ruelas, PT Physical Therapist               Mobility     Row Name 06/01/22 0915          Bed Mobility    Bed Mobility --  -CZ     Row Name 06/01/22 0915          Sit-Stand Transfer    Sit-Stand [place] (Transfers) contact guard  -CZ     Assistive Device (Sit-Stand Transfers) walker, front-wheeled  -CZ     Row Name 06/01/22 0915          Gait/Stairs (Locomotion)    [place] Level (Gait) contact guard  -     Assistive Device (Gait) walker, front-wheeled  -CZ     Distance in Feet (Gait) 15'x2.  -CZ     Comment, (Gait/Stairs) Slow gabo, cues for proper use of walker, distance limited by dyspnea, SpO2 100% on 2 LPM.  -CZ           User Key  (r) = Recorded By, (t) = Taken By, (c) = Cosigned By    Initials Name Provider Type    CZ David Ruelas, PT Physical Therapist               Obj/Interventions     Row Name 06/01/22 0915          Range of Motion Comprehensive    General Range of Motion bilateral lower extremity ROM WFL  -     Row Name 06/01/22 0915          Strength Comprehensive (MMT)    General Manual Muscle Testing (MMT) Assessment other (see comments)  -CZ     Comment, General Manual Muscle Testing (MMT) Assessment BLEs: 3/5 grossly.  -CZ     Row Name 06/01/22 0915          Sensory Assessment (Somatosensory)    Sensory Assessment (Somatosensory) LE sensation intact  -CZ           User Key  (r) = Recorded By, (t) = Taken By, (c) = Cosigned By    Initials Name Provider Type    CZ David Ruelas, PT Physical Therapist               Goals/Plan     Row Name 06/01/22 0915          Bed Mobility Goal 1 (PT)    Activity/Assistive  Device (Bed Mobility Goal 1, PT) sit to supine/supine to sit  -CZ     [place] Level/Cues Needed (Bed Mobility Goal 1, PT) modified independence  -CZ     Time Frame (Bed Mobility Goal 1, PT) by discharge  -CZ     Progress/Outcomes (Bed Mobility Goal 1, PT) goal not met  -CZ     Row Name 06/01/22 0915          Transfer Goal 1 (PT)    Activity/Assistive Device (Transfer Goal 1, PT) sit-to-stand/stand-to-sit;bed-to-chair/chair-to-bed;walker, rolling  -CZ     [place] Level/Cues Needed (Transfer Goal 1, PT) modified independence  -CZ     Time Frame (Transfer Goal 1, PT) by discharge  -CZ     Strategies/Barriers (Transfers Goal 1, PT) Maintain SpO2 >90%.  -CZ     Progress/Outcome (Transfer Goal 1, PT) goal not met  -CZ     Row Name 06/01/22 0915          Gait Training Goal 1 (PT)    Activity/Assistive Device (Gait Training Goal 1, PT) walker, rolling  -CZ     [place] Level (Gait Training Goal 1, PT) modified independence  -CZ     Distance (Gait Training Goal 1, PT) 75' x 2.  -CZ     Time Frame (Gait Training Goal 1, PT) by discharge  -CZ     Strategies/Barriers (Gait Training Goal 1, PT) Maintain SpO2 >90%.  -CZ     Progress/Outcome (Gait Training Goal 1, PT) goal not met  -CZ     Row Name 06/01/22 0915          Problem Specific Goal 1 (PT)    Problem Specific Goal 1 (PT) Score 24/28 on [place] fall risk assessment.  -CZ     Time Frame (Problem Specific Goal 1, PT) by discharge  -CZ     Strategies/Barriers (Problem Specific Goal 1, PT) Maintain SpO2 >90%.  -CZ     Progress/Outcome (Problem Specific Goal 1, PT) goal not met  -CZ     Row Name 06/01/22 0915          Therapy Assessment/Plan (PT)    Planned Therapy Interventions (PT) balance training;bed mobility training;gait training;patient/family education;transfer training;strengthening;stretching  -CZ           User Key  (r) = Recorded By, (t) = Taken By, (c) = Cosigned By    Initials Name Provider Type    CZ David Ruelas, PT Physical Therapist                Clinical Impression     Row Name 06/01/22 0915          Pain    Pretreatment Pain Rating 4/10  -CZ     Posttreatment Pain Rating 4/10  -CZ     Pain Location - throat  -CZ     Pain Intervention(s) Ambulation/increased activity;Distraction;Repositioned  -CZ     Row Name 06/01/22 0915          Plan of Care Review    Plan of Care Reviewed With patient  -CZ     Outcome Evaluation Initial PT evaluation complete, co-evaluation with OT.  Patient is alert and cooperative.  She requires CGA with transfers and gait, ambulating 15'x2 with FWW, slow gabo, cues for proper use of walker, distance limited by dyspnea though SpO2 100% on 2 LPM. Patient lives alone but has relatives close by.  Recommend home with assistance and HHPT to increase patient's activity tolerance and lower fall risk.  Goals established, continue skilled I/P PT.  -     Row Name 06/01/22 0915          Therapy Assessment/Plan (PT)    Rehab Potential (PT) good, to achieve stated therapy goals  -     Criteria for Skilled Interventions Met (PT) yes;skilled treatment is necessary  -     Therapy Frequency (PT) 5 times/wk  -     Row Name 06/01/22 0915          Vital Signs    Pre Systolic BP Rehab 114  -CZ     Pre Treatment Diastolic BP 58  -CZ     Post Systolic BP Rehab 124  -CZ     Post Treatment Diastolic BP 64  -CZ     Pretreatment Heart Rate (beats/min) 120  -CZ     Posttreatment Heart Rate (beats/min) 123  -CZ     Pre SpO2 (%) 100  -CZ     O2 Delivery Pre Treatment nasal cannula  2 LPM  -CZ     Post SpO2 (%) 100  -CZ     O2 Delivery Post Treatment nasal cannula  -CZ     Pre Patient Position Sitting  -CZ     Post Patient Position Sitting  -CZ     Row Name 06/01/22 0915          Positioning and Restraints    Pre-Treatment Position sitting in chair/recliner  -CZ     Post Treatment Position chair  -CZ     In Chair reclined;call light within reach;encouraged to call for assist;with family/caregiver  -CZ           User Key  (r) = Recorded By, (t) =  Taken By, (c) = Cosigned By    Initials Name Provider Type     David Ruelas, PT Physical Therapist               Outcome Measures     Row Name 06/01/22 0915          How much help from another person do you currently need...    Turning from your back to your side while in flat bed without using bedrails? 3  -CZ     Moving from lying on back to sitting on the side of a flat bed without bedrails? 3  -CZ     Moving to and from a bed to a chair (including a wheelchair)? 3  -CZ     Standing up from a chair using your arms (e.g., wheelchair, bedside chair)? 3  -CZ     Climbing 3-5 steps with a railing? 3  -CZ     To walk in hospital room? 3  -CZ     AM-PAC 6 Clicks Score (PT) 18  -CZ     Highest level of mobility 6 --> Walked 10 steps or more  -     Row Name 06/01/22 0915          Functional Assessment    Outcome Measure Options AM-PAC 6 Clicks Basic Mobility (PT)  -           User Key  (r) = Recorded By, (t) = Taken By, (c) = Cosigned By    Initials Name Provider Type     David Ruelas, PT Physical Therapist                             Physical Therapy Education                 Title: PT OT SLP Therapies (In Progress)     Topic: Physical Therapy (In Progress)     Point: Mobility training (Done)     Learning Progress Summary           Patient Acceptance, E, VU,NR by  at 6/1/2022 1003    Comment: PT POC, proper use of walker, HHPT.   Family Acceptance, E, VU,NR by  at 6/1/2022 1003    Comment: PT POC, proper use of walker, HHPT.                   Point: Home exercise program (Not Started)     Learner Progress:  Not documented in this visit.          Point: Body mechanics (Not Started)     Learner Progress:  Not documented in this visit.          Point: Precautions (Not Started)     Learner Progress:  Not documented in this visit.                      User Key     Initials Effective Dates Name Provider Type Discipline     06/16/21 -  David Ruelas, PT Physical Therapist PT              PT  Recommendation and Plan  Planned Therapy Interventions (PT): balance training, bed mobility training, gait training, patient/family education, transfer training, strengthening, stretching  Plan of Care Reviewed With: patient  Outcome Evaluation: Initial PT evaluation complete, co-evaluation with OT.  Patient is alert and cooperative.  She requires CGA with transfers and gait, ambulating 15'x2 with FWW, slow gabo, cues for proper use of walker, distance limited by dyspnea though SpO2 100% on 2 LPM. Patient lives alone but has relatives close by.  Recommend home with assistance and HHPT to increase patient's activity tolerance and lower fall risk.  Goals established, continue skilled I/P PT.     Time Calculation:    PT Charges     Row Name 06/01/22 1009             Time Calculation    Start Time 0915  -CZ      Stop Time 1008  -CZ      Time Calculation (min) 53 min  -CZ      PT Received On 06/01/22  -CZ      PT Goal Re-Cert Due Date 06/14/22  -CZ              Untimed Charges    PT Eval/Re-eval Minutes 53  -CZ              Total Minutes    Untimed Charges Total Minutes 53  -CZ       Total Minutes 53  -CZ            User Key  (r) = Recorded By, (t) = Taken By, (c) = Cosigned By    Initials Name Provider Type    CZ David Ruelas, PT Physical Therapist              Therapy Charges for Today     Code Description Service Date Service Provider Modifiers Qty    01597580708 HC PT EVAL MOD COMPLEXITY 4 6/1/2022 David Ruelas, PT GP 1          PT G-Codes  Outcome Measure Options: AM-PAC 6 Clicks Basic Mobility (PT)  AM-PAC 6 Clicks Score (PT): 18    David Ruelas PT  6/1/2022

## 2022-06-02 NOTE — PLAN OF CARE
Goal Outcome Evaluation:  Plan of Care Reviewed With: patient, family           Outcome Evaluation: pt t/don sup to sit w/ CGA, sat EOB ~12' w/ CGA/SBA, pt would at times have posterior lean requiring assist and vc's to correct, pt able to perform 10 reps of 2 seated B LE ther ex, pt requesting to return to supine 2' slight dizziness w/ EOB

## 2022-06-02 NOTE — THERAPY TREATMENT NOTE
Acute Care - Physical Therapy Treatment Note  [facility]     Patient Name: Tamara Singh  : 1940  MRN: [id number]  Today's Date: 2022      Visit Dx:     ICD-10-CM ICD-9-CM   1. Pneumonia of left lower lobe due to infectious organism  J18.9 486   2. Acute on chronic respiratory failure with hypoxia and hypercapnia (HCC)  J96.21 518.84    J96.22 786.09     799.02   3. Dysphagia, unspecified type  R13.10 787.20   4. Impaired functional mobility, balance, gait, and endurance  Z74.09 V49.89   5. Impaired mobility and activities of daily living  Z74.09 V49.89    Z78.9      Patient Active Problem List   Diagnosis   • Nuclear sclerosis   • Artificial lens present   • Borderline glaucoma   • Corneal endothelial dystrophy   • Pseudophakia   • Cortical age-related cataract   • Nuclear cataract   • History of esophageal cancer   • Acute kidney injury (HCC)   • Pneumonia of left lower lobe due to infectious organism   • Severe malnutrition (HCC)     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)    • Nuclear cataract    • Perleche    • Pneumathemia (HCC)     UNSPECIFIED ORGANISM   • Shoulder joint pain    • Upper respiratory infection    • Vitamin D deficiency    • Wheezing      Past Surgical History:   Procedure Laterality Date   • CATARACT EXTRACTION  2015    Remove cataract, insert lens (Left eye.)   • CHOLECYSTECTOMY   07/14/2004   • COLONOSCOPY  05/23/2013    Colon endoscopy 78165 (Internal & external hemorrhoids found.)   • ENDOSCOPY  11/26/2014    EGD w/ biopsy 28400 (Normal esophagus. Biopsy taken. Normal stomach. Normal duodenum.)   11/26/2014 GURPREET BHAT    • ENDOSCOPY  03/19/2014    EGD w/ tube 41843 (Esophagitis seen. Biopsy taken. No evidence of esophageal ca. Gastritis in stomach. Biopsy taken. Normal duodenum.)   • ENDOSCOPY  10/02/2013    EGD w/ tube 18647 (Normal esopahgus, stoamch, & duodenum. No evidence of tumor. Biopsy taken.)   • ENDOSCOPY  05/23/2013    EGD w/ tube 34931 (Tumor in distal 3rd of esophagus. Biopsy taken. Lesion runs from 28 cms to 34 cms. Normal stomach & duodenum.)   • INJECTION OF MEDICATION  01/25/2016    Kenalog (5)      • OTHER SURGICAL HISTORY  12/10/2014    Drain/Inject Major Joint 20610 (Shoulder joint pain)    • OTHER SURGICAL HISTORY      OCT DISC NFL 18233 (Primary open angle glaucoma (2): 7/26/2016, 6/16/2015   • OTHER SURGICAL HISTORY  07/26/2016    OPTIC NERVE HEAD EVAL PERFORMED 2027F (Primary open angle glaucoma)      PT Assessment (last 12 hours)     PT Evaluation and Treatment     Row Name 06/02/22 1320          Physical Therapy Time and Intention    Subjective Information no complaints  -     Document Type therapy note (daily note)  -     Mode of Treatment physical therapy;individual therapy  -     Patient Effort fair  -     Comment pt's family states she had a rough night, pt agreeable to EOB  -     Row Name 06/02/22 1320          General Information    Patient Profile Reviewed yes  -     Existing Precautions/Restrictions fall;oxygen therapy device and L/min  -     Row Name 06/02/22 1320          Pain    Pretreatment Pain Rating 0/10 - no pain  -     Posttreatment Pain Rating 0/10 - no pain  -     Row Name 06/02/22 1320          Cognition    Orientation Status (Cognition) oriented to;person  -     Row Name 06/02/22 1320          Bed Mobility    Bed Mobility  supine-sit;sit-supine  -     Supine-Sit [place] (Bed Mobility) contact guard  -     Sit-Supine [place] (Bed Mobility) minimum assist (75% patient effort)  -     Assistive Device (Bed Mobility) bed rails;head of bed elevated  -     Comment, (Bed Mobility) pt sat EOB ~12' w/ CGA/SBA, pt would at times have a posterior lean requiring assist and vc's to correct  -     Row Name 06/02/22 1320          Safety Issues, Functional Mobility    Impairments Affecting Function (Mobility) strength;endurance/activity tolerance  -     Row Name 06/02/22 1320          Motor Skills    Therapeutic Exercise hip;knee;ankle  -[facility] Name 06/02/22 1320          Knee (Therapeutic Exercise)    Knee (Therapeutic Exercise) AROM (active range of motion)  -     Knee AROM (Therapeutic Exercise) bilateral;LAQ (long arc quad);sitting  -[facility] Name 06/02/22 1320          Ankle (Therapeutic Exercise)    Ankle (Therapeutic Exercise) AROM (active range of motion)  -     Ankle AROM (Therapeutic Exercise) bilateral;dorsiflexion;plantarflexion;sitting  -     Row Name 06/02/22 1320          Plan of Care Review    Plan of Care Reviewed With patient;family  -     Outcome Evaluation pt t/don sup to sit w/ CGA, sat EOB ~12' w/ CGA/SBA, pt would at times have posterior lean requiring assist and vc's to correct, pt able to perform 10 reps of 2 seated B LE ther ex, pt requesting to return to supine 2' slight dizziness w/ EOB  -     Row Name 06/02/22 1320          Vital Signs    Pre Systolic BP Rehab 98  -     Pre Treatment Diastolic BP 52  manual by nsg  -JW     Pretreatment Heart Rate (beats/min) 67  -     Posttreatment Heart Rate (beats/min) 96  -     Pre SpO2 (%) 97  -     O2 Delivery Pre Treatment supplemental O2  -     Post SpO2 (%) 98  -     O2 Delivery Post Treatment supplemental O2  -     Pre Patient Position Supine  -JW     Intra Patient Position Sitting  -     Post Patient Position Supine  -JW      Row Name 06/02/22 1320          Positioning and Restraints    Pre-Treatment Position in bed  -JW     Post Treatment Position bed  -JW     In Bed supine;call light within reach;encouraged to call for assist;exit alarm on  -JW     Row Name 06/02/22 1320          Therapy Assessment/Plan (PT)    Rehab Potential (PT) good, to achieve stated therapy goals  -     Criteria for Skilled Interventions Met (PT) yes;skilled treatment is necessary  -     Therapy Frequency (PT) 5 times/wk  -     Row Name 06/02/22 1320          Bed Mobility Goal 1 (PT)    Activity/Assistive Device (Bed Mobility Goal 1, PT) sit to supine/supine to sit  -JW     [place] Level/Cues Needed (Bed Mobility Goal 1, PT) modified independence  -JW     Time Frame (Bed Mobility Goal 1, PT) by discharge  -JW     Progress/Outcomes (Bed Mobility Goal 1, PT) goal not met  -     Row Name 06/02/22 1320          Transfer Goal 1 (PT)    Activity/Assistive Device (Transfer Goal 1, PT) sit-to-stand/stand-to-sit;bed-to-chair/chair-to-bed;walker, rolling  -JW     [place] Level/Cues Needed (Transfer Goal 1, PT) modified independence  -JW     Time Frame (Transfer Goal 1, PT) by discharge  -JW     Strategies/Barriers (Transfers Goal 1, PT) Maintain SpO2 >90%.  -JW     Progress/Outcome (Transfer Goal 1, PT) goal not met  -     Row Name 06/02/22 1320          Gait Training Goal 1 (PT)    Activity/Assistive Device (Gait Training Goal 1, PT) walker, rolling  -JW     [place] Level (Gait Training Goal 1, PT) modified independence  -JW     Distance (Gait Training Goal 1, PT) 75' x 2.  -JW     Time Frame (Gait Training Goal 1, PT) by discharge  -JW     Strategies/Barriers (Gait Training Goal 1, PT) Maintain SpO2 >90%.  -JW     Progress/Outcome (Gait Training Goal 1, PT) goal not met  -     Row Name 06/02/22 1320          Problem Specific Goal 1 (PT)    Problem Specific Goal 1 (PT) Score 24/28 on Tinetti fall risk assessment.  -JW     Time Frame (Problem  Specific Goal 1, PT) by discharge  -     Strategies/Barriers (Problem Specific Goal 1, PT) Maintain SpO2 >90%.  -     Progress/Outcome (Problem Specific Goal 1, PT) goal not met  -           User Key  (r) = Recorded By, (t) = Taken By, (c) = Cosigned By    Initials Name Provider Type    JW Cynthia Mix, PTA Physical Therapist Assistant                Physical Therapy Education                 Title: PT OT SLP Therapies (In Progress)     Topic: Physical Therapy (In Progress)     Point: Mobility training (Done)     Learning Progress Summary           Patient Acceptance, E, VU,NR by  at 6/1/2022 1003    Comment: PT POC, proper use of walker, HHPT.   Family Acceptance, E, VU,NR by  at 6/1/2022 1003    Comment: PT POC, proper use of walker, HHPT.                   Point: Home exercise program (Not Started)     Learner Progress:  Not documented in this visit.          Point: Body mechanics (Not Started)     Learner Progress:  Not documented in this visit.          Point: Precautions (Not Started)     Learner Progress:  Not documented in this visit.                      User Key     Initials Effective Dates Name Provider Type Discipline     06/16/21 -  David Ruelas, PT Physical Therapist PT              PT Recommendation and Plan  Anticipated Discharge Disposition (PT): home with assist, home with home health  Therapy Frequency (PT): 5 times/wk  Plan of Care Reviewed With: patient, family  Outcome Evaluation: pt t/don sup to sit w/ CGA, sat EOB ~12' w/ CGA/SBA, pt would at times have posterior lean requiring assist and vc's to correct, pt able to perform 10 reps of 2 seated B LE ther ex, pt requesting to return to supine 2' slight dizziness w/ EOB   Outcome Measures     Row Name 06/01/22 0925             How much help from another is currently needed...    Putting on and taking off regular lower body clothing? 3  -RB      Bathing (including washing, rinsing, and drying) 2  -RB      Toileting (which  includes using toilet bed pan or urinal) 3  -RB      Putting on and taking off regular upper body clothing 3  -RB      Taking care of personal grooming (such as brushing teeth) 4  -RB      Eating meals 4  -RB      AM-PAC 6 Clicks Score (OT) 19  -RB              Functional Assessment    Outcome Measure Options AM-PAC 6 Clicks Daily Activity (OT)  -            User Key  (r) = Recorded By, (t) = Taken By, (c) = Cosigned By    Initials Name Provider Type    RB Hung Bonds, OT Occupational Therapist                 Time Calculation:    PT Charges     Row Name 06/02/22 1320             Time Calculation    Start Time 1320  -      Stop Time 1336  -      Time Calculation (min) 16 min  -      PT Received On 06/02/22  -              Time Calculation- PT    Total Timed Code Minutes- PT 16 minute(s)  -            User Key  (r) = Recorded By, (t) = Taken By, (c) = Cosigned By    Initials Name Provider Type    Cynthia Brown PTA Physical Therapist Assistant              Therapy Charges for Today     Code Description Service Date Service Provider Modifiers Qty    [id number]  PT THERAPEUTIC ACT EA 15 MIN 6/2/2022 Cynthia Mix PTA GP 1          PT G-Codes  Outcome Measure Options: AM-PAC 6 Clicks Daily Activity (OT)  AM-PAC 6 Clicks Score (PT): 18  AM-PAC 6 Clicks Score (OT): 19    Cynthia Mix PTA  6/2/2022

## 2022-06-02 NOTE — CONSULTS
[facility]  INPATIENT CONSULT NOTE  Tamara Singh  82 y.o. female    Referring Provider: Chang Ellis MD    Reason for the consult: Elevated troponin      Chief complaint: Difficulty breathing      History of present Illness: Patient was admitted with exacerbation of her COPD and acute respiratory failure.  She was found to have a pneumonia.  She denies any chest pain.  Troponins were drawn and they were elevated slightly.  Also noted that she does have renal insufficiency.  She unfortunately also has esophageal carcinoma.  She denies any angina.        Allergies: No Known Allergies      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)    • Nuclear cataract    • Perleche    • Pneumathemia (HCC)     UNSPECIFIED ORGANISM   • Shoulder joint pain    • Upper respiratory infection    • Vitamin D deficiency    • Wheezing          Past Surgical History:   Procedure Laterality Date   • CATARACT EXTRACTION  08/06/2015    Remove cataract, insert lens (Left eye.)   • CHOLECYSTECTOMY  07/14/2004   • COLONOSCOPY  05/23/2013    Colon endoscopy 75412 (Internal & external hemorrhoids found.)   • ENDOSCOPY  11/26/2014    EGD w/ biopsy 30043 (Normal esophagus. Biopsy taken. Normal stomach. Normal duodenum.)   11/26/2014 GURPREET BHAT    • ENDOSCOPY  03/19/2014    EGD w/ tube 11343 (Esophagitis seen. Biopsy taken. No evidence of  esophageal ca. Gastritis in stomach. Biopsy taken. Normal duodenum.)   • ENDOSCOPY  10/02/2013    EGD w/ tube 68471 (Normal esopahgus, stoamch, & duodenum. No evidence of tumor. Biopsy taken.)   • ENDOSCOPY  2013    EGD w/ tube 16582 (Tumor in distal 3rd of esophagus. Biopsy taken. Lesion runs from 28 cms to 34 cms. Normal stomach & duodenum.)   • INJECTION OF MEDICATION  2016    Kenalog (5)      • OTHER SURGICAL HISTORY  12/10/2014    Drain/Inject Major Joint  (Shoulder joint pain)    • OTHER SURGICAL HISTORY      OCT DISC NFL 32160 (Primary open angle glaucoma (2): 2016, 2015   • OTHER SURGICAL HISTORY  2016    OPTIC NERVE HEAD EVAL PERFORMED  (Primary open angle glaucoma)          Family History   Problem Relation Age of Onset   • Diabetes Mother    • Cancer Father    • Cancer Brother    • Diabetes Brother          Social History     Socioeconomic History   • Marital status:    Tobacco Use   • Smoking status: Former Smoker     Packs/day: 1.00     Years: 59.00     Pack years: 59.00     Types: Cigarettes     Start date: 1955     Quit date: 2014     Years since quittin.4   • Smokeless tobacco: Never Used   Substance and Sexual Activity   • Alcohol use: No   • Drug use: No   • Sexual activity: Defer         Current Facility-Administered Medications   Medication Dose Route Frequency Provider Last Rate Last Admin   • acetaminophen (TYLENOL) tablet 650 mg  650 mg Oral Q4H PRN Marlys Mims MD   650 mg at 22 1513   • albuterol (PROVENTIL) nebulizer solution 0.083% 2.5 mg/3mL  2.5 mg Nebulization Q4H PRN Marlys Mims MD   2.5 mg at 22 0241   • aspirin EC tablet 81 mg  81 mg Oral Daily Marlys Mims MD   81 mg at 22 1410   • azithromycin (ZITHROMAX) 500 mg in sodium chloride 0.9 % 250 mL IVPB  500 mg Intravenous Once Marlys Mims MD   Restarted at 22 1133   • benzonatate (TESSALON) capsule 100 mg  100 mg Oral TID  PRN Marlys Mims MD   100 mg at 05/31/22 2030   • cefTRIAXone (ROCEPHIN) 1 g/100 mL 0.9% NS (MBP)  1 g Intravenous Q24H Marlys Mims MD 0 mL/hr at 05/31/22 1120 1 g at 06/02/22 0947   • cetirizine (zyrTEC) tablet 5 mg  5 mg Oral Daily Marlys Mims MD   5 mg at 06/02/22 0947   • cholecalciferol (VITAMIN D3) tablet 1,000 Units  1,000 Units Oral Daily Marlys Mims MD   1,000 Units at 06/02/22 0947   • doxycycline (VIBRAMYCIN) 100 mg/100 mL 0.9% NS MBP  100 mg Intravenous Q12H Marlys Mims MD   100 mg at 06/02/22 1013   • furosemide (LASIX) injection 20 mg  20 mg Intravenous Daily Marlys Mims MD   20 mg at 06/02/22 0947   • guaiFENesin-dextromethorphan (ROBITUSSIN DM) 100-10 MG/5ML syrup 10 mL  10 mL Oral 4x Daily PRN Marlys Mims MD   10 mL at 05/31/22 2030   • ipratropium-albuterol (DUO-NEB) nebulizer solution 3 mL  3 mL Nebulization 4x Daily - RT Marlys Mims MD   3 mL at 06/02/22 1431   • latanoprost (XALATAN) 0.005 % ophthalmic solution 1 drop  1 drop Both Eyes Nightly Marlys Mims MD   1 drop at 05/31/22 2031   • methylPREDNISolone sodium succinate (SOLU-Medrol) injection 40 mg  40 mg Intravenous Q12H Marlys Mims MD   40 mg at 06/02/22 1115   • metoprolol tartrate (LOPRESSOR) tablet 25 mg  25 mg Oral Q12H Marlys Mims MD   25 mg at 06/02/22 0947   • morphine injection 2 mg  2 mg Intravenous Q4H PRN Marlys Mims MD        And   • naloxone (NARCAN) injection 0.4 mg  0.4 mg Intravenous Q5 Min PRN Marlys Mims MD       • ondansetron (ZOFRAN) injection 4 mg  4 mg Intravenous Q6H PRN Marlys Mims MD       • sertraline (ZOLOFT) tablet 25 mg  25 mg Oral Daily Marlys Mims MD   25 mg at 06/02/22 0947   • sodium chloride 0.9 % flush 10 mL  10 mL Intravenous PRN Marlys Mims MD       • sodium chloride 0.9 % flush 10 mL  10 mL Intravenous Q12H Marlys Mims MD   10 mL at 06/02/22 0947   • sodium chloride 0.9  "% flush 10 mL  10 mL Intravenous PRN Marlys Mims MD             Review of Systems:     Constitution:  Denies any fatigue, fever or chills.    HENT:  Denies any headache, hearing impairment.    Eyes:  Denies any blurring of vision, or photophobia.     Cardiovascular:  As per history of present illness.     Respiratory:  Denies any COPD, shortness of breath.     Endocrine:  No history of hyperlipidemia, diabetes.       Musculoskeletal:  No history of arthritis with musculoskeletal problems.    Gastrointestinal:  No nausea, vomiting, or melena.    Genitourinary:  No dysuria or hematuria.    Neurological:  No history of seizure disorder, stroke, or memory problems.    Psychiatric/Behavioral:  No history of depression, bipolar disorder or schizophrenia.     Hematological:  No history of easy bruising.            OBJECTIVE:    /60 (BP Location: Left arm, Patient Position: Lying)   Pulse 97   Temp 96.9 °F (36.1 °C) (Oral)   Resp 18   Ht 147.3 cm (57.99\")   Wt 38.6 kg (85 lb 1.6 oz)   SpO2 100%   BMI 17.79 kg/m²       Physical Exam:     Constitutional: Elderly, sleepy female.  Skin:  Warm and dry.     Head:  Normocephalic and atraumatic.     Eyes:  Pupils are equal, round, and reactive to light.     Neck:  Neck supple. No bruit in the carotids.  No elevation of JVD.    Cardiovascular:  [place] in the fifth intercostal space, regular rate, and rhythm.  S1 greater than S2, no S3 or S4, no gallop.     Pulmonary/Chest: Rhonchi     Abdominal:  Soft. No hepatosplenomegaly, bowel sounds are present.    Musculoskeletal:  No kyphoscoliosis.    Neurological:  Is alert and oriented to person, place, and time.  Cranial nerves are intact.  No motor or sensory deficit.    Extremities:  No edema, no radial femoral delay.    Psychiatric:  The patient has a normal mood and affect.  Behavior is normal.        Lab Results (last 24 hours)     Procedure Component Value Units Date/Time    Blood Gas, Arterial - [[id number]]  " (Abnormal) Collected: 06/01/22 2019    Specimen: Arterial Blood Updated: 06/01/22 2022     Site Right Brachial     Ruiz's Test N/A     pH, Arterial 7.219 pH units      Comment: 85 Value below critical limit        pCO2, Arterial 59.1 mm Hg      Comment: 83 Value above reference range        pO2, Arterial 129.0 mm Hg      Comment: 83 Value above reference range        HCO3, Arterial 24.1 mmol/L      Base Excess, Arterial -3.9 mmol/L      Comment: 84 Value below reference range        O2 Saturation, Arterial 99.3 %      Comment: 83 Value above reference range        Barometric Pressure for Blood Gas 744 mmHg      Modality Nasal Cannula     FIO2 <21 %      Flow Rate 2.0 lpm      Ventilator Mode NA     Collected by Ninfa Francis     Comment: Meter: [id number]     :  [id number]       Basic Metabolic Panel [[id number]]  (Abnormal) Collected: 06/02/22 0540    Specimen: Blood Updated: 06/02/22 0606     Glucose 172 mg/dL      BUN 74 mg/dL      Creatinine 1.94 mg/dL      Sodium 136 mmol/L      Potassium 4.9 mmol/L      Chloride 103 mmol/L      CO2 20.0 mmol/L      Calcium 9.1 mg/dL      BUN/Creatinine Ratio 38.1     Anion Gap 13.0 mmol/L      eGFR 25.6 mL/min/1.73      Comment: National Kidney Foundation and American Society of Nephrology (ASN) Task Force recommended calculation based on the Chronic Kidney Disease Epidemiology Collaboration (CKD-EPI) equation refit without adjustment for race.       Narrative:      GFR Normal >60  Chronic Kidney Disease <60  Kidney Failure <15      CBC & Differential [[id number]]  (Abnormal) Collected: 06/02/22 0540    Specimen: Blood Updated: 06/02/22 0545    Narrative:      The following orders were created for panel order CBC & Differential.  Procedure                               Abnormality         Status                     ---------                               -----------         ------                     CBC Auto Differential[[id number]]        Abnormal            Final  result                 Please view results for these tests on the individual orders.    CBC Auto Differential [[id number]]  (Abnormal) Collected: 06/02/22 0540    Specimen: Blood Updated: 06/02/22 0545     WBC 7.92 10*3/mm3      RBC 3.25 10*6/mm3      Hemoglobin 10.0 g/dL      Hematocrit 30.0 %      MCV 92.3 fL      MCH 30.8 pg      MCHC 33.3 g/dL      RDW 13.8 %      RDW-SD 47.3 fl      MPV 9.1 fL      Platelets 285 10*3/mm3      Neutrophil % 85.9 %      Lymphocyte % 5.9 %      Monocyte % 4.0 %      Eosinophil % 0.0 %      Basophil % 0.4 %      Immature Grans % 3.8 %      Neutrophils, Absolute 6.80 10*3/mm3      Lymphocytes, Absolute 0.47 10*3/mm3      Monocytes, Absolute 0.32 10*3/mm3      Eosinophils, Absolute 0.00 10*3/mm3      Basophils, Absolute 0.03 10*3/mm3      Immature Grans, Absolute 0.30 10*3/mm3      nRBC 0.3 /100 WBC                  A/P:  1.  Her elevated troponin is multifactorial.-No doubt that she probably has coronary disease given her long smoking history and COPD.  I think for the troponin positivity its due to type II MI along with her renal insufficiency.  She is having no chest pain.  She has multiple comorbidities.  She is on home oxygen.  I think her echo probably is a result of all the above.  This time I spoken to the family and we all have agreed that she would be best treated with medical therapy.  I think the best thing to do is put a nitro patch on her to put on the morning and take off at bedtime.  This was be the simplest way to help vasodilate the arteries.    2.  Pulmonary hypertension-this is probably secondary to her COPD and pneumonia.    We will see the patient as needed.  For follow-up she will need to follow-up with Dr. Salgado who is going to take care of patients in my long term.            This document has been electronically signed by Ruddy Tim MD on June 2, 2022 16:52 CDT           Part of this note may be an electronic transcription/translation of spoken  language to printed text using the Dragon Dictation System.

## 2022-06-02 NOTE — PLAN OF CARE
Goal Outcome Evaluation: Patient resting well in bed today. Mental status has improved throughout shift. Family at bedside. NO distress noted. VS stable.

## 2022-06-02 NOTE — PLAN OF CARE
Goal Outcome Evaluation:           Progress: no change  Outcome Evaluation: Patient has been sleepy and sometimes restless this shift; she can still recognize her niece at bedside; she wakes up and went to the bathroom x1 with 1 assist to pee; no signs of respiratory distress;oxygen saturation WDL;VSS;patient and family declined heparin shots;will continue to monitor

## 2022-06-02 NOTE — PROGRESS NOTES
[facility] Medicine Services  INPATIENT PROGRESS NOTE    Length of Stay: 2  Date of Admission: 5/30/2022  Primary Care Physician: Jean Pierre Scanlon MD    Subjective   Chief Complaint: Cough, shortness of breath    HPI: Patient is more alert today.  States that her shortness of breath is improved.  She denies chest pain.    Review of Systems   Constitutional: Positive for fatigue. Negative for activity change, appetite change, chills and fever.   HENT: Negative for congestion, ear pain, rhinorrhea, sore throat and trouble swallowing.    Respiratory: Positive for cough and shortness of breath. Negative for chest tightness and wheezing.    Cardiovascular: Negative for chest pain, palpitations and leg swelling.   Gastrointestinal: Negative for abdominal distention, abdominal pain, diarrhea, nausea and vomiting.   Genitourinary: Negative for difficulty urinating, dysuria and hematuria.   Musculoskeletal: Negative for arthralgias, back pain and myalgias.   Skin: Negative for pallor and rash.   Neurological: Positive for weakness. Negative for dizziness, syncope, light-headedness and headaches.   Hematological: Negative for adenopathy. Does not bruise/bleed easily.   Psychiatric/Behavioral: Negative for agitation and confusion. The patient is not nervous/anxious.      Objective    Temp:  [96.9 °F (36.1 °C)-98 °F (36.7 °C)] 96.9 °F (36.1 °C)  Heart Rate:  [] 97  Resp:  [18-20] 18  BP: ()/(52-77) 117/60    Physical Exam  Constitutional:       General: She is not in acute distress.     Appearance: She is ill-appearing. She is not diaphoretic.      Comments: Thin and chronically ill-appearing, malnourished elderly woman.   HENT:      Head: Normocephalic and atraumatic.      Right Ear: External ear normal.      Left Ear: External ear normal.      Nose: No congestion or rhinorrhea.      Mouth/Throat:      Mouth: Mucous membranes are moist.      Pharynx: No oropharyngeal  exudate or posterior oropharyngeal erythema.   Eyes:      General: No scleral icterus.     Extraocular Movements: Extraocular movements intact.      Conjunctiva/sclera: Conjunctivae normal.   Cardiovascular:      Rate and Rhythm: Normal rate and regular rhythm.      Heart sounds: Normal heart sounds. No murmur heard.  Pulmonary:      Effort: Pulmonary effort is normal. No respiratory distress.      Breath sounds: No wheezing, rhonchi or rales.      Comments: Reduced breath sounds globally..  Abdominal:      General: Abdomen is flat. There is no distension.      Palpations: Abdomen is soft.      Tenderness: There is no abdominal tenderness. There is no guarding.   Musculoskeletal:         General: No swelling, tenderness or deformity.      Cervical back: Neck supple. No rigidity. No muscular tenderness.      Right lower leg: No edema.      Left lower leg: No edema.   Lymphadenopathy:      Cervical: No cervical adenopathy.   Skin:     General: Skin is warm and dry.   Neurological:      General: No focal deficit present.      Mental Status: She is alert and oriented to person, place, and time.      Cranial Nerves: No cranial nerve deficit.      Motor: No weakness.   Psychiatric:         Mood and Affect: Mood normal.         Behavior: Behavior normal.         Thought Content: Thought content normal.       Medication Review:    Current Facility-Administered Medications:   •  acetaminophen (TYLENOL) tablet 650 mg, 650 mg, Oral, Q4H PRN, Marlys Mims MD, 650 mg at 05/31/22 1513  •  albuterol (PROVENTIL) nebulizer solution 0.083% 2.5 mg/3mL, 2.5 mg, Nebulization, Q4H PRN, Marlys Mims MD, 2.5 mg at 06/01/22 0241  •  aspirin EC tablet 81 mg, 81 mg, Oral, Daily, Marlys Mims MD, 81 mg at 06/02/22 1410  •  azithromycin (ZITHROMAX) 500 mg in sodium chloride 0.9 % 250 mL IVPB, 500 mg, Intravenous, Once, Marlys Mims MD, Restarted at 05/30/22 1133  •  benzonatate (TESSALON) capsule 100 mg, 100 mg,  Oral, TID PRN, Marlys Mims MD, 100 mg at 05/31/22 2030  •  cefTRIAXone (ROCEPHIN) 1 g/100 mL 0.9% NS (MBP), 1 g, Intravenous, Q24H, Marlys Mims MD, Last Rate: 0 mL/hr at 05/31/22 1120, 1 g at 06/02/22 0947  •  cetirizine (zyrTEC) tablet 5 mg, 5 mg, Oral, Daily, Marlys Mims MD, 5 mg at 06/02/22 0947  •  cholecalciferol (VITAMIN D3) tablet 1,000 Units, 1,000 Units, Oral, Daily, Marlys Mims MD, 1,000 Units at 06/02/22 0947  •  doxycycline (VIBRAMYCIN) 100 mg/100 mL 0.9% NS MBP, 100 mg, Intravenous, Q12H, Marlys Mims MD, 100 mg at 06/02/22 1013  •  furosemide (LASIX) injection 20 mg, 20 mg, Intravenous, Daily, Marlys Mims MD, 20 mg at 06/02/22 0947  •  guaiFENesin-dextromethorphan (ROBITUSSIN DM) 100-10 MG/5ML syrup 10 mL, 10 mL, Oral, 4x Daily PRN, Marlys Mims MD, 10 mL at 05/31/22 2030  •  ipratropium-albuterol (DUO-NEB) nebulizer solution 3 mL, 3 mL, Nebulization, 4x Daily - RT, Marlys Mims MD, 3 mL at 06/02/22 1431  •  latanoprost (XALATAN) 0.005 % ophthalmic solution 1 drop, 1 drop, Both Eyes, Nightly, Marlys Mims MD, 1 drop at 05/31/22 2031  •  methylPREDNISolone sodium succinate (SOLU-Medrol) injection 40 mg, 40 mg, Intravenous, Q12H, Marlys Mims MD, 40 mg at 06/02/22 1115  •  metoprolol tartrate (LOPRESSOR) tablet 25 mg, 25 mg, Oral, Q12H, Marlys Mims MD, 25 mg at 06/02/22 0947  •  morphine injection 2 mg, 2 mg, Intravenous, Q4H PRN **AND** naloxone (NARCAN) injection 0.4 mg, 0.4 mg, Intravenous, Q5 Min PRN, Marlys Mims MD  •  [START ON 6/3/2022] nitroglycerin (NITRODUR) 0.2 MG/HR patch 1 patch, 1 patch, Transdermal, Daily, Ruddy Tim MD  •  ondansetron (ZOFRAN) injection 4 mg, 4 mg, Intravenous, Q6H PRN, Marlys Mims MD  •  sertraline (ZOLOFT) tablet 25 mg, 25 mg, Oral, Daily, Marlys Mims MD, 25 mg at 06/02/22 0947  •  sodium chloride 0.9 % flush 10 mL, 10 mL, Intravenous, PRN, Prasanna  Marlys JEREZ MD  •  sodium chloride 0.9 % flush 10 mL, 10 mL, Intravenous, Q12H, Marlys Mims MD, 10 mL at 06/02/22 0947  •  sodium chloride 0.9 % flush 10 mL, 10 mL, Intravenous, PRN, Marlys Mims MD    I have reviewed the patient's current medications.     Results Review:  I have reviewed the labs, radiology results, and diagnostic studies.    Laboratory Data:   Results from last 7 days   Lab Units 06/02/22  0540 06/01/22  0530 05/31/22  0537 05/30/22  0421   SODIUM mmol/L 136 138 138 136   POTASSIUM mmol/L 4.9 4.8 4.2 4.2   CHLORIDE mmol/L 103 102 102 99   CO2 mmol/L 20.0* 23.0 21.0* 25.0   BUN mg/dL 74* 58* 45* 34*   CREATININE mg/dL 1.94* 1.71* 1.44* 1.46*   GLUCOSE mg/dL 172* 153* 120* 185*   CALCIUM mg/dL 9.1 10.0 9.4 9.3   BILIRUBIN mg/dL  --   --   --  0.4   ALK PHOS U/L  --   --   --  206*   ALT (SGPT) U/L  --   --   --  28   AST (SGOT) U/L  --   --   --  61*   ANION GAP mmol/L 13.0 13.0 15.0 12.0     Estimated Creatinine Clearance: 13.9 mL/min (A) (by C-G formula based on SCr of 1.94 mg/dL (H)).  Results from last 7 days   Lab Units 06/01/22  1518 05/30/22  0421   MAGNESIUM mg/dL 2.6* 2.1         Results from last 7 days   Lab Units 06/02/22  0540 06/01/22  0530 05/31/22  0537 05/30/22  0421   WBC 10*3/mm3 7.92 12.68* 14.87* 11.60*   HEMOGLOBIN g/dL 10.0* 10.1* 10.6* 10.1*   HEMATOCRIT % 30.0* 31.8* 33.2* 30.8*   PLATELETS 10*3/mm3 285 340 285 260           Culture Data:   No results found for: BLOODCX  No results found for: URINECX  Respiratory Culture   Date Value Ref Range Status   05/30/2022 Rejected  Final     No results found for: WOUNDCX  No results found for: STOOLCX  No components found for: BODYFLD    Radiology Data:   Imaging Results (Last 24 Hours)     ** No results found for the last 24 hours. **          Assessment/Plan     Hospital Problem List:  Active Problems:    Pneumonia of left lower lobe due to infectious organism    Severe malnutrition (HCC)  Severe chronic disease  related Malnutrition  COPD  Chronic respiratory failure with hypoxia on 3 L of home oxygen  Essential hypertension  History of esophageal cancer s/p chemoradiation  History of glaucoma  CKD  Elevated troponin     Plan  Continue IV antibiotic with ceftriaxone and azithromycin. Continue oxygen by nasal cannula and titrate to keep O2 sat greater than 92%.  Blood cultures negative     Continue bronchodilator nebulizer treatments.  Continue IV Solu-Medrol 40 mg every 12 hours.  Check    Cardiology input appreciated.  Patient had elevated troponin which is likely secondary to type II MI.  She will be started on nitroglycerin patch.  Continue aspirin 81 mg daily and p.o. metoprolol 25 mg every 12 hours.     Monitor renal function given CKD history.  Hold lisinopril given borderline blood pressures.  Continue IV Lasix 20 mg daily.    PT/OT for disposition planning     DVT prophylaxis with SCDs     CODE STATUS is DNR/DNI    Discharge Planning: In progress    I confirmed that the patient's Advance Care Plan is present, code status is documented, or surrogate decision maker is listed in the patient's medical record.      I have utilized all available immediate resources to obtain, update, or review the patient's current medications.      Marlys Mims MD   06/02/22   18:32 CDT

## 2022-06-02 NOTE — MEDICAL STUDENT
"    [facility] Medicine Services  INPATIENT PROGRESS NOTE    Length of Stay: 2  Date of Admission: 5/30/2022  Primary Care Physician: Jean Pierre Scanlon MD    Subjective   Chief Complaint: abdominal soreness from heparin shot  HPI: The patient has abdominal soreness from heparin shot. She also has a productive cough and back pain that feels like a \"muscle spasm\" when she coughs. Patient is oriented to person, place, and time. The patient mentioned Marques Carbajal during the encounter. Patient kept eyes closed and appeared to fall asleep during the exam. She denies chest pain today.     Review of Systems   Respiratory: Positive for cough.         Productive cough   Cardiovascular: Negative for chest pain and leg swelling.   Musculoskeletal: Positive for back pain.       Objective    Temp:  [97.8 °F (36.6 °C)] 97.8 °F (36.6 °C)  Heart Rate:  [] 108  Resp:  [18-20] 20  BP: (107-109)/(58-70) 109/70    Physical Exam  Vitals and nursing note reviewed.   Constitutional:       Appearance: She is ill-appearing.      Comments: malnourished    HENT:      Mouth/Throat:      Pharynx: Oropharynx is clear.   Cardiovascular:      Rate and Rhythm: Tachycardia present.   Pulmonary:      Breath sounds: Wheezing present.      Comments: Patient is on nasal canula  Skin:     General: Skin is warm and dry.   Neurological:      Mental Status: She is alert and oriented to person, place, and time.       Medication Review:    Current Facility-Administered Medications:   •  acetaminophen (TYLENOL) tablet 650 mg, 650 mg, Oral, Q4H PRN, Marlys Mims MD, 650 mg at 05/31/22 1513  •  albuterol (PROVENTIL) nebulizer solution 0.083% 2.5 mg/3mL, 2.5 mg, Nebulization, Q4H PRN, Marlys Mims MD, 2.5 mg at 06/01/22 0241  •  azithromycin (ZITHROMAX) 500 mg in sodium chloride 0.9 % 250 mL IVPB, 500 mg, Intravenous, Once, Marlys Mims MD, Restarted at 05/30/22 1133  •  benzonatate (TESSALON) " capsule 100 mg, 100 mg, Oral, TID PRN, Marlys Mims MD, 100 mg at 05/31/22 2030  •  cefTRIAXone (ROCEPHIN) 1 g/100 mL 0.9% NS (MBP), 1 g, Intravenous, Q24H, Marlys Mims MD, Last Rate: 0 mL/hr at 05/31/22 1120, 1 g at 06/02/22 0947  •  cetirizine (zyrTEC) tablet 5 mg, 5 mg, Oral, Daily, Marlys Mims MD, 5 mg at 06/02/22 0947  •  cholecalciferol (VITAMIN D3) tablet 1,000 Units, 1,000 Units, Oral, Daily, Marlys Mims MD, 1,000 Units at 06/02/22 0947  •  doxycycline (VIBRAMYCIN) 100 mg/100 mL 0.9% NS MBP, 100 mg, Intravenous, Q12H, Marlys Mims MD, 100 mg at 06/02/22 1013  •  furosemide (LASIX) injection 20 mg, 20 mg, Intravenous, Daily, Marlys Mims MD, 20 mg at 06/02/22 0947  •  guaiFENesin-dextromethorphan (ROBITUSSIN DM) 100-10 MG/5ML syrup 10 mL, 10 mL, Oral, 4x Daily PRN, Marlys Mims MD, 10 mL at 05/31/22 2030  •  heparin (porcine) 5000 UNIT/ML injection 5,000 Units, 5,000 Units, Subcutaneous, Q8H, Marlys Mims MD, 5,000 Units at 06/01/22 1307  •  ipratropium-albuterol (DUO-NEB) nebulizer solution 3 mL, 3 mL, Nebulization, 4x Daily - RT, Marlys Mims MD, 3 mL at 06/02/22 0729  •  latanoprost (XALATAN) 0.005 % ophthalmic solution 1 drop, 1 drop, Both Eyes, Nightly, Marlys Mims MD, 1 drop at 05/31/22 2031  •  methylPREDNISolone sodium succinate (SOLU-Medrol) injection 40 mg, 40 mg, Intravenous, Q12H, Marlys Mims MD, 40 mg at 06/02/22 0019  •  metoprolol tartrate (LOPRESSOR) tablet 25 mg, 25 mg, Oral, Q12H, Marlys Mims MD, 25 mg at 06/02/22 0947  •  morphine injection 2 mg, 2 mg, Intravenous, Q4H PRN **AND** naloxone (NARCAN) injection 0.4 mg, 0.4 mg, Intravenous, Q5 Min PRN, Marlys Mims MD  •  ondansetron (ZOFRAN) injection 4 mg, 4 mg, Intravenous, Q6H PRN, Marlys Mims MD  •  sertraline (ZOLOFT) tablet 25 mg, 25 mg, Oral, Daily, Marlys Mims MD, 25 mg at 06/02/22 0947  •  sodium chloride 0.9 % flush 10 mL,  10 mL, Intravenous, PRN, Marlys Mims MD  •  sodium chloride 0.9 % flush 10 mL, 10 mL, Intravenous, Q12H, Marlys Mims MD, 10 mL at 06/02/22 0947  •  sodium chloride 0.9 % flush 10 mL, 10 mL, Intravenous, PRPrasanna ARAGON Sotonte E, MD    I have reviewed the patient's current medications.     Results Review:  I have reviewed the labs, radiology results, and diagnostic studies.    Laboratory Data:   Results from last 7 days   Lab Units 06/02/22  0540 06/01/22  0530 05/31/22  0537 05/30/22  0421   SODIUM mmol/L 136 138 138 136   POTASSIUM mmol/L 4.9 4.8 4.2 4.2   CHLORIDE mmol/L 103 102 102 99   CO2 mmol/L 20.0* 23.0 21.0* 25.0   BUN mg/dL 74* 58* 45* 34*   CREATININE mg/dL 1.94* 1.71* 1.44* 1.46*   GLUCOSE mg/dL 172* 153* 120* 185*   CALCIUM mg/dL 9.1 10.0 9.4 9.3   BILIRUBIN mg/dL  --   --   --  0.4   ALK PHOS U/L  --   --   --  206*   ALT (SGPT) U/L  --   --   --  28   AST (SGOT) U/L  --   --   --  61*   ANION GAP mmol/L 13.0 13.0 15.0 12.0     Estimated Creatinine Clearance: 13.9 mL/min (A) (by C-G formula based on SCr of 1.94 mg/dL (H)).  Results from last 7 days   Lab Units 06/01/22  1518 05/30/22  0421   MAGNESIUM mg/dL 2.6* 2.1         Results from last 7 days   Lab Units 06/02/22  0540 06/01/22  0530 05/31/22  0537 05/30/22  0421   WBC 10*3/mm3 7.92 12.68* 14.87* 11.60*   HEMOGLOBIN g/dL 10.0* 10.1* 10.6* 10.1*   HEMATOCRIT % 30.0* 31.8* 33.2* 30.8*   PLATELETS 10*3/mm3 285 340 285 260           Culture Data:   No results found for: BLOODCX  No results found for: URINECX  Respiratory Culture   Date Value Ref Range Status   05/30/2022 Rejected  Final     No results found for: WOUNDCX  No results found for: STOOLCX  No components found for: BODYFLD    Radiology Data:   Imaging Results (Last 24 Hours)     ** No results found for the last 24 hours. **          Assessment/Plan     Hospital Problem List:  Active Problems:    Pneumonia of left lower lobe due to infectious organism    Severe malnutrition  (HCC)    Continue IV antibiotic with ceftriaxone and azithromycin.     Continue oxygen by nasal cannula and titrate to keep O2 sat greater than 92%.     Blood culture have no growth at 3 days.      Continue bronchodilator nebulizer treatments.  Continue IV Solu-Medrol 40 mg every 12 hours.     Patient has elevated troponin which is likely related to her tachycardia, CKD and pneumonia.  monitor troponins.   -Echo showed EF 46-50%, moderate tricuspid valve regurgitation, moderate pulmonary hypertension, left ventricular wall segments are hypokinetic: mid inferoseptal    Monitor renal function given CKD history.  Hold lisinopril given infection and borderline blood pressures.   IV Lasix 20 mg daily     PT/OT      DVT prophylaxis with subcutaneous heparin    Tatiana Verdin, Medical Student   06/02/22   11:01 CDT

## 2022-06-02 NOTE — PROGRESS NOTES
Nutrition Services    Patient Name:  Tamara Singh  YOB: 1940  MRN: [id number]  Admit Date:  5/30/2022    F/U.  Family stated there is usually one of them here during meal times.  Provided them a menu to help with pt meal selection.  Stated liking the supplements and is drinking more than she is eating right now.  Intake average 61% for 7 meals.  Per notes, telemetry reported Vtach on 6/1.  Pt working with PT and OT.      Labs:  Glu 172, BUN 74, Cr 1.94, Mg 2.6, Alb 3.4    Meds:  Ceftriaxone, D3, vibramycin, lasix    CBW:  85#, 89# on admit.    RDN staff will monitor POC.     Electronically signed by:  Kemi Varela  06/02/22 13:30 CDT

## 2022-06-03 NOTE — PLAN OF CARE
Goal Outcome Evaluation:  Plan of Care Reviewed With: patient, family           Outcome Evaluation: pt t/fers sup to sit w/ CGA, sit<>stand w/ CGA and vc's w/ RW, pt ambulates ~6', 12' w/ RW w/ CGA, pt feels SOA after gt but O2 at 99%, pt did perform B LE seated ther ex 2 x 5 reps

## 2022-06-03 NOTE — THERAPY TREATMENT NOTE
Patient Name: Tamara iSngh  : 1940    MRN: [id number]                              Today's Date: 6/3/2022       Admit Date: 2022    Visit Dx:     ICD-10-CM ICD-9-CM   1. Pneumonia of left lower lobe due to infectious organism  J18.9 486   2. Acute on chronic respiratory failure with hypoxia and hypercapnia (HCC)  J96.21 518.84    J96.22 786.09     799.02   3. Dysphagia, unspecified type  R13.10 787.20   4. Impaired functional mobility, balance, gait, and endurance  Z74.09 V49.89   5. Impaired mobility and activities of daily living  Z74.09 V49.89    Z78.9      Patient Active Problem List   Diagnosis   • Nuclear sclerosis   • Artificial lens present   • Borderline glaucoma   • Corneal endothelial dystrophy   • Pseudophakia   • Cortical age-related cataract   • Nuclear cataract   • History of esophageal cancer   • Acute kidney injury (HCC)   • Pneumonia of left lower lobe due to infectious organism   • Severe malnutrition (HCC)     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)    • Nuclear cataract    • Perleche    • Pneumathemia (HCC)     UNSPECIFIED ORGANISM   • Shoulder joint pain    • Upper respiratory infection    • Vitamin D deficiency    • Wheezing      Past Surgical History:   Procedure Laterality Date   • CATARACT EXTRACTION  2015    Remove cataract, insert lens (Left eye.)   • CHOLECYSTECTOMY  2004   •  COLONOSCOPY  05/23/2013    Colon endoscopy 12796 (Internal & external hemorrhoids found.)   • ENDOSCOPY  11/26/2014    EGD w/ biopsy 28874 (Normal esophagus. Biopsy taken. Normal stomach. Normal duodenum.)   11/26/2014 GURPREET BHAT    • ENDOSCOPY  03/19/2014    EGD w/ tube 03811 (Esophagitis seen. Biopsy taken. No evidence of esophageal ca. Gastritis in stomach. Biopsy taken. Normal duodenum.)   • ENDOSCOPY  10/02/2013    EGD w/ tube 18564 (Normal esopahgus, stoamch, & duodenum. No evidence of tumor. Biopsy taken.)   • ENDOSCOPY  05/23/2013    EGD w/ tube 94556 (Tumor in distal 3rd of esophagus. Biopsy taken. Lesion runs from 28 cms to 34 cms. Normal stomach & duodenum.)   • INJECTION OF MEDICATION  01/25/2016    Kenalog (5)      • OTHER SURGICAL HISTORY  12/10/2014    Drain/Inject Major Joint 20610 (Shoulder joint pain)    • OTHER SURGICAL HISTORY      OCT DISC NFL 17872 (Primary open angle glaucoma (2): 7/26/2016, 6/16/2015   • OTHER SURGICAL HISTORY  07/26/2016    OPTIC NERVE HEAD EVAL PERFORMED 2027F (Primary open angle glaucoma)       General Information     Row Name 06/03/22 1325          OT Time and Intention    Document Type therapy note (daily note)  -TO     Mode of Treatment individual therapy;occupational therapy  -TO     Row Name 06/03/22 1320          General Information    Patient Profile Reviewed yes  -TO     Existing Precautions/Restrictions fall;oxygen therapy device and L/min  -TO     Row Name 06/03/22 1322          Cognition    Orientation Status (Cognition) oriented to;person;place;situation  -TO     Row Name 06/03/22 1321          Safety Issues, Functional Mobility    Impairments Affecting Function (Mobility) strength;endurance/activity tolerance  -TO           User Key  (r) = Recorded By, (t) = Taken By, (c) = Cosigned By    Initials Name Provider Type    TO Niles Montalvo COTA Occupational Therapist Assistant                 Mobility/ADL's     Row Name 06/03/22 1327          Bed Mobility    Bed  Mobility supine-sit;sit-supine  -TO     Supine-Sit [place] (Bed Mobility) contact guard  -TO     Sit-Supine [place] (Bed Mobility) standby assist  -TO     Assistive Device (Bed Mobility) bed rails;head of bed elevated  -TO     Row Name 06/03/22 1325          Transfers    Transfers sit-stand transfer;stand-sit transfer  -TO     Sit-Stand [place] (Transfers) contact guard;verbal cues  -TO     Stand-Sit [place] (Transfers) contact guard;verbal cues  -TO     [place] Level (Toilet Transfer) contact guard  -TO     Assistive Device (Toilet Transfer) grab bars/safety frame;raised toilet seat;walker, front-wheeled  -TO     Row Name 06/03/22 1325          Sit-Stand Transfer    Assistive Device (Sit-Stand Transfers) walker, front-wheeled  -TO     Row Name 06/03/22 1325          Stand-Sit Transfer    Assistive Device (Stand-Sit Transfers) walker, front-wheeled  -TO     Row Name 06/03/22 1325          Toilet Transfer    Type (Toilet Transfer) sit-stand;stand-sit  -TO     Row Name 06/03/22 1325          Functional Mobility    Functional Mobility- Ind. Level contact guard assist  -TO     Functional Mobility- Device walker, front-wheeled  -TO     Functional Mobility-Distance (Feet) 20  -TO     [facility] Name 06/03/22 1325          Activities of Daily Living    BADL Assessment/Intervention bathing;toileting  -TO     [facility] Name 06/03/22 1325          Toileting Assessment/Training    Position (Toileting) unsupported sitting  -TO           User Key  (r) = Recorded By, (t) = Taken By, (c) = Cosigned By    Initials Name Provider Type    TO Niles Montalvo COTA Occupational Therapist Assistant               Obj/Interventions     Row Name 06/03/22 1325          Range of Motion Comprehensive    General Range of Motion bilateral upper extremity ROM WNL  -TO     [facility] Name 06/03/22 1325          Strength Comprehensive (MMT)    General Manual Muscle Testing (MMT) Assessment other (see comments)  -TO           User Key  (r) =  Recorded By, (t) = Taken By, (c) = Cosigned By    Initials Name Provider Type    TO Niles Montalvo COTA Occupational Therapist Assistant               Goals/Plan     Row Name 06/03/22 1420          Transfer Goal 1 (OT)    Activity/Assistive Device (Transfer Goal 1, OT) transfers, all  -TO     [place] Level/Cues Needed (Transfer Goal 1, OT) modified independence  -TO     Time Frame (Transfer Goal 1, OT) long term goal (LTG)  -TO     Progress/Outcome (Transfer Goal 1, OT) goal not met  -TO     Row Name 06/03/22 1420          Bathing Goal 1 (OT)    Activity/Device (Bathing Goal 1, OT) bathing skills, all  -TO     [place] Level/Cues Needed (Bathing Goal 1, OT) supervision required  -TO     Time Frame (Bathing Goal 1, OT) long term goal (LTG)  -TO     Progress/Outcomes (Bathing Goal 1, OT) goal not met  -TO     Row Name 06/03/22 1420          Dressing Goal 1 (OT)    Activity/Device (Dressing Goal 1, OT) dressing skills, all  -TO     [place]/Cues Needed (Dressing Goal 1, OT) modified independence  -TO     Time Frame (Dressing Goal 1, OT) long term goal (LTG)  -TO     Progress/Outcome (Dressing Goal 1, OT) goal not met  -TO     Row Name 06/03/22 1420          Toileting Goal 1 (OT)    Activity/Device (Toileting Goal 1, OT) toileting skills, all  -TO     [place] Level/Cues Needed (Toileting Goal 1, OT) modified independence  -TO     Time Frame (Toileting Goal 1, OT) long term goal (LTG)  -TO     Progress/Outcome (Toileting Goal 1, OT) goal not met  -TO           User Key  (r) = Recorded By, (t) = Taken By, (c) = Cosigned By    Initials Name Provider Type    TO Niles Montalvo COTA Occupational Therapist Assistant               Clinical Impression     Row Name 06/03/22 1325          Pain Assessment    Pretreatment Pain Rating 0/10 - no pain  -TO     Posttreatment Pain Rating 0/10 - no pain  -TO     Row Name 06/03/22 1325          Plan of Care Review    Plan of Care Reviewed With patient  -TO      Progress improving  -TO     Outcome Evaluation Pt performed sup>sit SBA to perform fx'al mobiity EOB< >toilet CGA with RW. Pt SBA for toileting tasks.Pt performed 5x2 sit<>stand with RB between. pt O2>95 throughout tx.  -TO     Row Name 06/03/22 1325          Therapy Assessment/Plan (OT)    Rehab Potential (OT) good, to achieve stated therapy goals  -TO     Criteria for Skilled Therapeutic Interventions Met (OT) yes;meets criteria  -TO     Therapy Frequency (OT) other (see comments)  3-7 days/wk  -TO     Row Name 06/03/22 1325          Vital Signs    Post Systolic BP Rehab 89  -TO     Posttreatment Heart Rate (beats/min) 89  -TO     Pre SpO2 (%) 99  -TO     O2 Delivery Pre Treatment supplemental O2  -TO     Post SpO2 (%) 99  -TO     O2 Delivery Post Treatment supplemental O2  -TO     Pre Patient Position Supine  -TO     Intra Patient Position Sitting  -TO     Post Patient Position Supine  -TO     Row Name 06/03/22 1325          Positioning and Restraints    Pre-Treatment Position in bed  -TO     Post Treatment Position bed  -TO     In Bed call light within reach;encouraged to call for assist;exit alarm on;with family/caregiver  -TO           User Key  (r) = Recorded By, (t) = Taken By, (c) = Cosigned By    Initials Name Provider Type    TO Niles Montalvo COTA Occupational Therapist Assistant               Outcome Measures     Row Name 06/03/22 1325          How much help from another is currently needed...    Putting on and taking off regular lower body clothing? 3  -TO     Bathing (including washing, rinsing, and drying) 3  -TO     Toileting (which includes using toilet bed pan or urinal) 3  -TO     Putting on and taking off regular upper body clothing 3  -TO     Taking care of personal grooming (such as brushing teeth) 3  -TO     Eating meals 3  -TO     AM-PAC 6 Clicks Score (OT) 18  -TO           User Key  (r) = Recorded By, (t) = Taken By, (c) = Cosigned By    Initials Name Provider Type    TO Niles Montalvo  DARREN Occupational Therapist Assistant                Occupational Therapy Education                 Title: PT OT SLP Therapies (In Progress)     Topic: Occupational Therapy (In Progress)     Point: ADL training (Not Started)     Description:   Instruct learner(s) on proper safety adaptation and remediation techniques during self care or transfers.   Instruct in proper use of assistive devices.              Learner Progress:  Not documented in this visit.          Point: Home exercise program (Not Started)     Description:   Instruct learner(s) on appropriate technique for monitoring, assisting and/or progressing therapeutic exercises/activities.              Learner Progress:  Not documented in this visit.          Point: Precautions (Done)     Description:   Instruct learner(s) on prescribed precautions during self-care and functional transfers.              Learning Progress Summary           Patient Acceptance, E, VU by RB at 6/1/2022 1156    Comment: Edu pt on use of gait belt and non skid socks when OOB and no OOB without assist.                   Point: Body mechanics (Not Started)     Description:   Instruct learner(s) on proper positioning and spine alignment during self-care, functional mobility activities and/or exercises.              Learner Progress:  Not documented in this visit.                      User Key     Initials Effective Dates Name Provider Type Discipline     06/16/21 -  Hung Bonds, OT Occupational Therapist OT              OT Recommendation and Plan  Therapy Frequency (OT): other (see comments) (3-7 days/wk)  Plan of Care Review  Plan of Care Reviewed With: patient  Progress: improving  Outcome Evaluation: Pt performed sup>sit SBA to perform fx'al mobiity EOB< >toilet CGA with RW. Pt SBA for toileting tasks.Pt performed 5x2 sit<>stand with RB between. pt O2>95 throughout tx.     Time Calculation:    Time Calculation- OT     Row Name 06/03/22 8885             Time Calculation- OT    OT  Start Time 1325  -TO      OT Stop Time 1416  -TO      OT Time Calculation (min) 51 min  -TO      Total Timed Code Minutes- OT 51 minute(s)  -TO      OT Received On 06/03/22  -TO              Timed Charges    54763 - OT Therapeutic Activity Minutes 31  -TO      52525 - OT Self Care/Mgmt Minutes 20  -TO              Total Minutes    Timed Charges Total Minutes 51  -TO       Total Minutes 51  -TO            User Key  (r) = Recorded By, (t) = Taken By, (c) = Cosigned By    Initials Name Provider Type    TO Niles Montalvo COTA Occupational Therapist Assistant              Therapy Charges for Today     Code Description Service Date Service Provider Modifiers Qty    60410803524 HC OT THERAPEUTIC ACT EA 15 MIN 6/3/2022 Niles Montalvo COTA GO 2    47013457241 HC OT SELF CARE/MGMT/TRAIN EA 15 MIN 6/3/2022 Niles Montalvo COTA GO 1               DARREN Watkins  6/3/2022

## 2022-06-03 NOTE — PLAN OF CARE
Problem: Adult Inpatient Plan of Care  Goal: Plan of Care Review  Outcome: Ongoing, Progressing  Flowsheets  Taken 6/3/2022 1405 by Markie Lobo RN  Progress: improving  Outcome Evaluation: working with PT.  o2 @ 2 liter. no acute events. continue to monitor  Taken 6/3/2022 1027 by Cynthia Mix PTA  Plan of Care Reviewed With:   patient   family  Goal: Patient-Specific Goal (Individualized)  Outcome: Ongoing, Progressing  Goal: Absence of Hospital-Acquired Illness or Injury  Outcome: Ongoing, Progressing  Intervention: Identify and Manage Fall Risk  Recent Flowsheet Documentation  Taken 6/3/2022 1303 by Markie Lobo RN  Safety Promotion/Fall Prevention: safety round/check completed  Taken 6/3/2022 0939 by Markie Lobo RN  Safety Promotion/Fall Prevention: safety round/check completed  Taken 6/3/2022 0851 by Markie Lobo RN  Safety Promotion/Fall Prevention: safety round/check completed  Intervention: Prevent and Manage VTE (Venous Thromboembolism) Risk  Recent Flowsheet Documentation  Taken 6/3/2022 1123 by Markie Lobo RN  VTE Prevention/Management: sequential compression devices on  Goal: Optimal Comfort and Wellbeing  Outcome: Ongoing, Progressing  Intervention: Provide Person-Centered Care  Recent Flowsheet Documentation  Taken 6/3/2022 0851 by Markie Lobo RN  Trust Relationship/Rapport: care explained  Goal: Readiness for Transition of Care  Outcome: Ongoing, Progressing     Problem: Fall Injury Risk  Goal: Absence of Fall and Fall-Related Injury  Outcome: Ongoing, Progressing  Intervention: Promote Injury-Free Environment  Recent Flowsheet Documentation  Taken 6/3/2022 1303 by Markie Lobo RN  Safety Promotion/Fall Prevention: safety round/check completed  Taken 6/3/2022 0939 by Markie Lobo RN  Safety Promotion/Fall Prevention: safety round/check completed  Taken 6/3/2022 0851 by Markie Lobo RN  Safety Promotion/Fall Prevention: safety  round/check completed     Problem: Skin Injury Risk Increased  Goal: Skin Health and Integrity  Outcome: Ongoing, Progressing     Problem: COPD (Chronic Obstructive Pulmonary Disease) Comorbidity  Goal: Maintenance of COPD Symptom Control  Outcome: Ongoing, Progressing     Problem: Fluid Imbalance (Pneumonia)  Goal: Fluid Balance  Outcome: Ongoing, Progressing     Problem: Infection (Pneumonia)  Goal: Resolution of Infection Signs and Symptoms  Outcome: Ongoing, Progressing     Problem: Respiratory Compromise (Pneumonia)  Goal: Effective Oxygenation and Ventilation  Outcome: Ongoing, Progressing  Intervention: Promote Airway Secretion Clearance  Recent Flowsheet Documentation  Taken 6/3/2022 0851 by Markie Lobo RN  Cough And Deep Breathing: done independently per patient     Problem: Malnutrition  Goal: Improved Nutritional Intake  Outcome: Ongoing, Progressing   Goal Outcome Evaluation:           Progress: improving  Outcome Evaluation: working with PT.  o2 @ 2 liter. no acute events. continue to monitor

## 2022-06-03 NOTE — PLAN OF CARE
Goal Outcome Evaluation:  Plan of Care Reviewed With: patient        Progress: improving  Outcome Evaluation: Pt performed sup>sit SBA to perform fx'al mobiity EOB< >toilet CGA with RW. Pt SBA for toileting tasks.Pt performed 5x2 sit<>stand with RB between. pt O2>95 throughout tx.

## 2022-06-03 NOTE — THERAPY TREATMENT NOTE
Acute Care - Physical Therapy Treatment Note  [facility]     Patient Name: Tamara Singh  : 1940  MRN: [id number]  Today's Date: 6/3/2022      Visit Dx:     ICD-10-CM ICD-9-CM   1. Pneumonia of left lower lobe due to infectious organism  J18.9 486   2. Acute on chronic respiratory failure with hypoxia and hypercapnia (HCC)  J96.21 518.84    J96.22 786.09     799.02   3. Dysphagia, unspecified type  R13.10 787.20   4. Impaired functional mobility, balance, gait, and endurance  Z74.09 V49.89   5. Impaired mobility and activities of daily living  Z74.09 V49.89    Z78.9      Patient Active Problem List   Diagnosis   • Nuclear sclerosis   • Artificial lens present   • Borderline glaucoma   • Corneal endothelial dystrophy   • Pseudophakia   • Cortical age-related cataract   • Nuclear cataract   • History of esophageal cancer   • Acute kidney injury (HCC)   • Pneumonia of left lower lobe due to infectious organism   • Severe malnutrition (HCC)     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)    • Nuclear cataract    • Perleche    • Pneumathemia (HCC)     UNSPECIFIED ORGANISM   • Shoulder joint pain    • Upper respiratory infection    • Vitamin D deficiency    • Wheezing      Past Surgical History:   Procedure Laterality Date   • CATARACT EXTRACTION  2015    Remove cataract, insert lens (Left eye.)   • CHOLECYSTECTOMY   07/14/2004   • COLONOSCOPY  05/23/2013    Colon endoscopy 00689 (Internal & external hemorrhoids found.)   • ENDOSCOPY  11/26/2014    EGD w/ biopsy 73508 (Normal esophagus. Biopsy taken. Normal stomach. Normal duodenum.)   11/26/2014 GURPREET BHAT    • ENDOSCOPY  03/19/2014    EGD w/ tube 42611 (Esophagitis seen. Biopsy taken. No evidence of esophageal ca. Gastritis in stomach. Biopsy taken. Normal duodenum.)   • ENDOSCOPY  10/02/2013    EGD w/ tube 04267 (Normal esopahgus, stoamch, & duodenum. No evidence of tumor. Biopsy taken.)   • ENDOSCOPY  05/23/2013    EGD w/ tube 63082 (Tumor in distal 3rd of esophagus. Biopsy taken. Lesion runs from 28 cms to 34 cms. Normal stomach & duodenum.)   • INJECTION OF MEDICATION  01/25/2016    Kenalog (5)      • OTHER SURGICAL HISTORY  12/10/2014    Drain/Inject Major Joint 20610 (Shoulder joint pain)    • OTHER SURGICAL HISTORY      OCT DISC NFL 54497 (Primary open angle glaucoma (2): 7/26/2016, 6/16/2015   • OTHER SURGICAL HISTORY  07/26/2016    OPTIC NERVE HEAD EVAL PERFORMED 2027F (Primary open angle glaucoma)      PT Assessment (last 12 hours)     PT Evaluation and Treatment     [facility] Name 06/03/22 1027          Physical Therapy Time and Intention    Subjective Information no complaints  -     Document Type therapy note (daily note)  -     Mode of Treatment physical therapy;individual therapy  -     Patient Effort good  -[facility] Name 06/03/22 1027          General Information    Patient Profile Reviewed yes  -     Existing Precautions/Restrictions fall;oxygen therapy device and L/min  -[facility] Name 06/03/22 1027          Pain    Pretreatment Pain Rating 0/10 - no pain  -     Posttreatment Pain Rating 0/10 - no pain  -     Pre/Posttreatment Pain Comment while sitting EOB, does c/o low back pain  -[facility] Name 06/03/22 1027          Cognition    Orientation Status (Cognition) oriented to;person  -[facility] Name 06/03/22 1027          Bed Mobility    Bed Mobility  supine-sit;sit-supine  -     Supine-Sit [place] (Bed Mobility) contact guard  -     Sit-Supine [place] (Bed Mobility) standby assist  -     Assistive Device (Bed Mobility) bed rails;head of bed elevated  -[facility] Name 06/03/22 1027          Transfers    Transfers sit-stand transfer;stand-sit transfer  -     Sit-Stand [place] (Transfers) contact guard;verbal cues  -     Stand-Sit [place] (Transfers) contact guard;verbal cues  -[facility] Name 06/03/22 1027          Sit-Stand Transfer    Assistive Device (Sit-Stand Transfers) walker, front-wheeled  -[facility] Name 06/03/22 1027          Stand-Sit Transfer    Assistive Device (Stand-Sit Transfers) walker, front-wheeled  [facility] Name 06/03/22 1027          Gait/Stairs (Locomotion)    [place] Level (Gait) contact guard  -     Assistive Device (Gait) walker, front-wheeled  [facility]     Distance in Feet (Gait) 6', 12'  -[facility] Name 06/03/22 1027          Safety Issues, Functional Mobility    Impairments Affecting Function (Mobility) strength;endurance/activity tolerance  -[facility] Name 06/03/22 1027          Hip (Therapeutic Exercise)    Hip (Therapeutic Exercise) AROM (active range of motion)  -     Hip AROM (Therapeutic Exercise) bilateral;flexion;sitting  -[facility] Name 06/03/22 1027          Knee (Therapeutic Exercise)    Knee AROM (Therapeutic Exercise) bilateral;LAQ (long arc quad);sitting  -[facility] Name 06/03/22 1027          Ankle (Therapeutic Exercise)    Ankle AROM (Therapeutic Exercise) bilateral;dorsiflexion;plantarflexion;sitting  -[facility] Name 06/03/22 1027          Plan of Care Review    Plan of Care Reviewed With patient;family  -     Outcome Evaluation pt t/fers sup to sit w/ CGA, sit<>stand w/ CGA and vc's w/ RW, pt ambulates ~6', 12' w/ RW w/ CGA, pt feels SOA after gt but O2 at 99%, pt did perform B LE seated ther ex 2 x 5 reps  -[facility] Name 06/03/22 1027          Vital Signs    Pretreatment  Heart Rate (beats/min) 73  -JW     Posttreatment Heart Rate (beats/min) 88  -JW     Pre SpO2 (%) 99  -JW     O2 Delivery Pre Treatment supplemental O2  -JW     Post SpO2 (%) 99  -JW     O2 Delivery Post Treatment supplemental O2  -JW     Pre Patient Position Supine  -JW     Intra Patient Position Standing  -JW     Post Patient Position Supine  -JW     Row Name 06/03/22 1027          Positioning and Restraints    Pre-Treatment Position in bed  -JW     Post Treatment Position bed  -JW     In Bed supine;call light within reach;encouraged to call for assist;exit alarm on;with family/caregiver;SCD pump applied  -     Row Name 06/03/22 1027          Therapy Assessment/Plan (PT)    Rehab Potential (PT) good, to achieve stated therapy goals  -     Criteria for Skilled Interventions Met (PT) yes;skilled treatment is necessary  -     Therapy Frequency (PT) 5 times/wk  -     Row Name 06/03/22 1027          Bed Mobility Goal 1 (PT)    Activity/Assistive Device (Bed Mobility Goal 1, PT) sit to supine/supine to sit  -     [place] Level/Cues Needed (Bed Mobility Goal 1, PT) modified independence  -JW     Time Frame (Bed Mobility Goal 1, PT) by discharge  -     Progress/Outcomes (Bed Mobility Goal 1, PT) goal not met  -     Row Name 06/03/22 1027          Transfer Goal 1 (PT)    Activity/Assistive Device (Transfer Goal 1, PT) sit-to-stand/stand-to-sit;bed-to-chair/chair-to-bed;walker, rolling  -     [place] Level/Cues Needed (Transfer Goal 1, PT) modified independence  -JW     Time Frame (Transfer Goal 1, PT) by discharge  -     Strategies/Barriers (Transfers Goal 1, PT) Maintain SpO2 >90%.  -     Progress/Outcome (Transfer Goal 1, PT) goal not met  -     Row Name 06/03/22 1027          Gait Training Goal 1 (PT)    Activity/Assistive Device (Gait Training Goal 1, PT) walker, rolling  -     [place] Level (Gait Training Goal 1, PT) modified independence  -JW     Distance (Gait Training Goal 1,  PT) 75' x 2.  -     Time Frame (Gait Training Goal 1, PT) by discharge  -     Strategies/Barriers (Gait Training Goal 1, PT) Maintain SpO2 >90%.  -     Progress/Outcome (Gait Training Goal 1, PT) goal not met  -     Row Name 06/03/22 1027          Problem Specific Goal 1 (PT)    Problem Specific Goal 1 (PT) Score 24/28 on Tinetti fall risk assessment.  -     Time Frame (Problem Specific Goal 1, PT) by discharge  -     Strategies/Barriers (Problem Specific Goal 1, PT) Maintain SpO2 >90%.  -     Progress/Outcome (Problem Specific Goal 1, PT) goal not met  -           User Key  (r) = Recorded By, (t) = Taken By, (c) = Cosigned By    Initials Name Provider Type     Cynthia Mix, PTA Physical Therapist Assistant                Physical Therapy Education                 Title: PT OT SLP Therapies (In Progress)     Topic: Physical Therapy (In Progress)     Point: Mobility training (Done)     Learning Progress Summary           Patient Acceptance, E, VU,NR by  at 6/1/2022 1003    Comment: PT POC, proper use of walker, HHPT.   Family Acceptance, E, VU,NR by  at 6/1/2022 1003    Comment: PT POC, proper use of walker, HHPT.                   Point: Home exercise program (Not Started)     Learner Progress:  Not documented in this visit.          Point: Body mechanics (Not Started)     Learner Progress:  Not documented in this visit.          Point: Precautions (Not Started)     Learner Progress:  Not documented in this visit.                      User Key     Initials Effective Dates Name Provider Type Discipline     06/16/21 -  David Ruelas, PT Physical Therapist PT              PT Recommendation and Plan  Anticipated Discharge Disposition (PT): home with assist, home with home health  Therapy Frequency (PT): 5 times/wk  Plan of Care Reviewed With: patient, family  Outcome Evaluation: pt t/fers sup to sit w/ CGA, sit<>stand w/ CGA and vc's w/ RW, pt ambulates ~6', 12' w/ RW w/ CGA, pt feels  SOA after gt but O2 at 99%, pt did perform B LE seated ther ex 2 x 5 reps   Outcome Measures     Row Name 06/01/22 0920             How much help from another is currently needed...    Putting on and taking off regular lower body clothing? 3  -RB      Bathing (including washing, rinsing, and drying) 2  -RB      Toileting (which includes using toilet bed pan or urinal) 3  -RB      Putting on and taking off regular upper body clothing 3  -RB      Taking care of personal grooming (such as brushing teeth) 4  -RB      Eating meals 4  -RB      AM-PAC 6 Clicks Score (OT) 19  -RB              Functional Assessment    Outcome Measure Options AM-PAC 6 Clicks Daily Activity (OT)  -RB            User Key  (r) = Recorded By, (t) = Taken By, (c) = Cosigned By    Initials Name Provider Type    RB Hung Bonds, OT Occupational Therapist                 Time Calculation:    PT Charges     Row Name 06/03/22 1027             Time Calculation    Start Time 1027  -      Stop Time 1055  -      Time Calculation (min) 28 min  -      PT Received On 06/03/22  -              Time Calculation- PT    Total Timed Code Minutes- PT 28 minute(s)  -            User Key  (r) = Recorded By, (t) = Taken By, (c) = Cosigned By    Initials Name Provider Type    Cynthia Brown PTA Physical Therapist Assistant              Therapy Charges for Today     Code Description Service Date Service Provider Modifiers Qty    49093866882 HC PT THERAPEUTIC ACT EA 15 MIN 6/2/2022 Cynthia Mix, PTA GP 1    04571162560 HC GAIT TRAINING EA 15 MIN 6/3/2022 Cynthia Mix, PTA GP 1    02700998728 HC PT THER PROC EA 15 MIN 6/3/2022 Cynthia Mix, PTA GP 1          PT G-Codes  Outcome Measure Options: AM-PAC 6 Clicks Daily Activity (OT)  AM-PAC 6 Clicks Score (PT): 18  AM-PAC 6 Clicks Score (OT): 19    Cynthia Mix PTA  6/3/2022

## 2022-06-03 NOTE — MEDICAL STUDENT
[facility] Medicine Services  INPATIENT PROGRESS NOTE    Length of Stay: 3  Date of Admission: 5/30/2022  Primary Care Physician: Jean Pierre Scanlon MD    Subjective   Chief Complaint: cough  HPI: The patient is still coughing. The patient experiences hiccups after breathing treatments which makes her cough worse.     Review of Systems   Constitutional: Negative for fever.   Respiratory: Positive for cough and chest tightness.    Cardiovascular: Negative for chest pain and leg swelling.   Gastrointestinal: Negative for abdominal pain.   Neurological: Negative for dizziness and headaches.     Objective    Temp:  [96.2 °F (35.7 °C)-97.5 °F (36.4 °C)] 97 °F (36.1 °C)  Heart Rate:  [] 107  Resp:  [16-18] 18  BP: ()/(52-73) 111/66    Physical Exam  Vitals and nursing note reviewed.   Constitutional:       Comments: Elderly women, that appears thin and malnourished. Patient falls asleep during encounter.    HENT:      Mouth/Throat:      Mouth: Mucous membranes are moist.      Pharynx: Oropharynx is clear.   Cardiovascular:      Rate and Rhythm: Tachycardia present.   Pulmonary:      Breath sounds: Wheezing and rales present.   Abdominal:      General: Abdomen is flat. There is no distension.      Palpations: Abdomen is soft. There is no mass.      Tenderness: There is abdominal tenderness (tenderness on palpapation where heparin shots were given previously ).   Musculoskeletal:      Right lower leg: No edema.      Left lower leg: No edema.   Skin:     General: Skin is warm and dry.   Neurological:      Mental Status: She is alert and oriented to person, place, and time.         Medication Review:    Current Facility-Administered Medications:   •  acetaminophen (TYLENOL) tablet 650 mg, 650 mg, Oral, Q4H PRN, Marlys Mims MD, 650 mg at 05/31/22 1513  •  albuterol (PROVENTIL) nebulizer solution 0.083% 2.5 mg/3mL, 2.5 mg, Nebulization, Q4H PRN, Marlys Mims MD,  2.5 mg at 06/01/22 0241  •  aspirin EC tablet 81 mg, 81 mg, Oral, Daily, Marlys Mims MD, 81 mg at 06/02/22 1410  •  azithromycin (ZITHROMAX) 500 mg in sodium chloride 0.9 % 250 mL IVPB, 500 mg, Intravenous, Once, Marlys Mims MD, Restarted at 05/30/22 1133  •  benzonatate (TESSALON) capsule 100 mg, 100 mg, Oral, TID PRN, Marlys Mims MD, 100 mg at 06/02/22 2111  •  cefTRIAXone (ROCEPHIN) 1 g/100 mL 0.9% NS (MBP), 1 g, Intravenous, Q24H, Marlys Mims MD, Last Rate: 0 mL/hr at 05/31/22 1120, 1 g at 06/02/22 0947  •  cetirizine (zyrTEC) tablet 5 mg, 5 mg, Oral, Daily, Marlys Mims MD, 5 mg at 06/02/22 0947  •  cholecalciferol (VITAMIN D3) tablet 1,000 Units, 1,000 Units, Oral, Daily, Marlys Mims MD, 1,000 Units at 06/02/22 0947  •  doxycycline (VIBRAMYCIN) 100 mg/100 mL 0.9% NS MBP, 100 mg, Intravenous, Q12H, Marlys Mims MD, 100 mg at 06/02/22 2111  •  furosemide (LASIX) injection 20 mg, 20 mg, Intravenous, Daily, Marlys Mims MD, 20 mg at 06/02/22 0947  •  guaiFENesin-dextromethorphan (ROBITUSSIN DM) 100-10 MG/5ML syrup 10 mL, 10 mL, Oral, 4x Daily PRN, Marlys Mims MD, 10 mL at 05/31/22 2030  •  ipratropium-albuterol (DUO-NEB) nebulizer solution 3 mL, 3 mL, Nebulization, 4x Daily - RT, Marlys Mims MD, 3 mL at 06/03/22 0801  •  latanoprost (XALATAN) 0.005 % ophthalmic solution 1 drop, 1 drop, Both Eyes, Nightly, Marlys Mims MD, 1 drop at 06/02/22 2111  •  methylPREDNISolone sodium succinate (SOLU-Medrol) injection 40 mg, 40 mg, Intravenous, Q12H, Marlys Mims MD, 40 mg at 06/02/22 2347  •  metoprolol tartrate (LOPRESSOR) tablet 25 mg, 25 mg, Oral, Q12H, Marlys Mims MD, 25 mg at 06/02/22 2111  •  morphine injection 2 mg, 2 mg, Intravenous, Q4H PRN **AND** naloxone (NARCAN) injection 0.4 mg, 0.4 mg, Intravenous, Q5 Min PRN, Marlys Mims MD  •  nitroglycerin (NITRODUR) 0.2 MG/HR patch 1 patch, 1 patch, Transdermal,  Daily, Ruddy Tim MD  •  ondansetron (ZOFRAN) injection 4 mg, 4 mg, Intravenous, Q6H PRN, Marlys iMms MD  •  sertraline (ZOLOFT) tablet 25 mg, 25 mg, Oral, Daily, Marlys Mims MD, 25 mg at 06/02/22 0947  •  sodium chloride 0.9 % flush 10 mL, 10 mL, Intravenous, PRN, Marlys Mims MD  •  sodium chloride 0.9 % flush 10 mL, 10 mL, Intravenous, Q12H, Marlys Mims MD, 10 mL at 06/02/22 2112  •  sodium chloride 0.9 % flush 10 mL, 10 mL, Intravenous, PRN, Marlys Mims MD    I have reviewed the patient's current medications.     Results Review:  I have reviewed the labs, radiology results, and diagnostic studies.    Laboratory Data:   Results from last 7 days   Lab Units 06/03/22  0551 06/02/22  0540 06/01/22  0530 05/31/22  0537 05/30/22  0421   SODIUM mmol/L 136 136 138   < > 136   POTASSIUM mmol/L 4.5 4.9 4.8   < > 4.2   CHLORIDE mmol/L 101 103 102   < > 99   CO2 mmol/L 22.0 20.0* 23.0   < > 25.0   BUN mg/dL 77* 74* 58*   < > 34*   CREATININE mg/dL 1.81* 1.94* 1.71*   < > 1.46*   GLUCOSE mg/dL 143* 172* 153*   < > 185*   CALCIUM mg/dL 9.7 9.1 10.0   < > 9.3   BILIRUBIN mg/dL  --   --   --   --  0.4   ALK PHOS U/L  --   --   --   --  206*   ALT (SGPT) U/L  --   --   --   --  28   AST (SGOT) U/L  --   --   --   --  61*   ANION GAP mmol/L 13.0 13.0 13.0   < > 12.0    < > = values in this interval not displayed.     Estimated Creatinine Clearance: 16.3 mL/min (A) (by C-G formula based on SCr of 1.81 mg/dL (H)).  Results from last 7 days   Lab Units 06/01/22  1518 05/30/22  0421   MAGNESIUM mg/dL 2.6* 2.1         Results from last 7 days   Lab Units 06/03/22  0551 06/02/22  0540 06/01/22  0530 05/31/22  0537 05/30/22  0421   WBC 10*3/mm3 6.23 7.92 12.68* 14.87* 11.60*   HEMOGLOBIN g/dL 9.2* 10.0* 10.1* 10.6* 10.1*   HEMATOCRIT % 28.2* 30.0* 31.8* 33.2* 30.8*   PLATELETS 10*3/mm3 282 285 340 285 260           Culture Data:   No results found for: BLOODCX  No results found for:  URINECX  No results found for: RESPCX  No results found for: WOUNDCX  No results found for: STOOLCX  No components found for: BODYFLD    Radiology Data:   Imaging Results (Last 24 Hours)     ** No results found for the last 24 hours. **          Assessment/Plan     Hospital Problem List:  Active Problems:    Pneumonia of left lower lobe due to infectious organism    Severe malnutrition (HCC)    Continue IV antibiotic with ceftriaxone and azithromycin.   -Continue oxygen by nasal cannula and titrate to keep O2 sat greater than 92%.    -Blood cultures negative     Continue bronchodilator nebulizer treatments.  Continue IV Solu-Medrol 40 mg every 12 hours.  Check     Cardiology input appreciated, per their note the elevated troponin which is likely secondary to type II MI.    -Patient started on nitroglycerin patch.   -Continue aspirin 81 mg daily and p.o. metoprolol -25 mg every 12 hours.     Monitor renal function given CKD history.    -Hold lisinopril given low borderline blood pressures  -Continue IV Lasix 20 mg daily.     PT/OT for disposition planning     DVT prophylaxis with SCDs      Tatiana Verdin, Medical Student   06/03/22   08:48 CDT

## 2022-06-04 NOTE — THERAPY TREATMENT NOTE
Patient Name: Tamara Singh  : 1940    MRN: [id number]                              Today's Date: 2022       Admit Date: 2022    Visit Dx:     ICD-10-CM ICD-9-CM   1. Pneumonia of left lower lobe due to infectious organism  J18.9 486   2. Acute on chronic respiratory failure with hypoxia and hypercapnia (HCC)  J96.21 518.84    J96.22 786.09     799.02   3. Dysphagia, unspecified type  R13.10 787.20   4. Impaired functional mobility, balance, gait, and endurance  Z74.09 V49.89   5. Impaired mobility and activities of daily living  Z74.09 V49.89    Z78.9      Patient Active Problem List   Diagnosis   • Nuclear sclerosis   • Artificial lens present   • Borderline glaucoma   • Corneal endothelial dystrophy   • Pseudophakia   • Cortical age-related cataract   • Nuclear cataract   • History of esophageal cancer   • Acute kidney injury (HCC)   • Pneumonia of left lower lobe due to infectious organism   • Severe malnutrition (HCC)     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)    • Nuclear cataract    • Perleche    • Pneumathemia (HCC)     UNSPECIFIED ORGANISM   • Shoulder joint pain    • Upper respiratory infection    • Vitamin D deficiency    • Wheezing      Past Surgical History:   Procedure Laterality Date   • CATARACT EXTRACTION  2015    Remove cataract, insert lens (Left eye.)   • CHOLECYSTECTOMY  2004   •  COLONOSCOPY  05/23/2013    Colon endoscopy 28937 (Internal & external hemorrhoids found.)   • ENDOSCOPY  11/26/2014    EGD w/ biopsy 18875 (Normal esophagus. Biopsy taken. Normal stomach. Normal duodenum.)   11/26/2014 GURPREET BHAT    • ENDOSCOPY  03/19/2014    EGD w/ tube 52396 (Esophagitis seen. Biopsy taken. No evidence of esophageal ca. Gastritis in stomach. Biopsy taken. Normal duodenum.)   • ENDOSCOPY  10/02/2013    EGD w/ tube 14698 (Normal esopahgus, stoamch, & duodenum. No evidence of tumor. Biopsy taken.)   • ENDOSCOPY  05/23/2013    EGD w/ tube 13679 (Tumor in distal 3rd of esophagus. Biopsy taken. Lesion runs from 28 cms to 34 cms. Normal stomach & duodenum.)   • INJECTION OF MEDICATION  01/25/2016    Kenalog (5)      • OTHER SURGICAL HISTORY  12/10/2014    Drain/Inject Major Joint 20610 (Shoulder joint pain)    • OTHER SURGICAL HISTORY      OCT DISC NFL 14626 (Primary open angle glaucoma (2): 7/26/2016, 6/16/2015   • OTHER SURGICAL HISTORY  07/26/2016    OPTIC NERVE HEAD EVAL PERFORMED 2027F (Primary open angle glaucoma)       General Information     Row Name 06/04/22 1414          OT Time and Intention    Document Type therapy note (daily note)  -CS     Mode of Treatment occupational therapy  -CS     Row Name 06/04/22 1414          General Information    Patient Profile Reviewed yes  -CS     Existing Precautions/Restrictions fall;oxygen therapy device and L/min  -CS     Row Name 06/04/22 1414          Cognition    Orientation Status (Cognition) oriented to;person;place;situation  -CS     Row Name 06/04/22 1414          Safety Issues, Functional Mobility    Impairments Affecting Function (Mobility) strength;endurance/activity tolerance  -CS           User Key  (r) = Recorded By, (t) = Taken By, (c) = Cosigned By    Initials Name Provider Type    CS Trudy Combs COTA Occupational Therapist Assistant                 Mobility/ADL's     Row Name 06/04/22 1414          Bed Mobility    Bed Mobility  supine-sit;sit-supine  -CS     Supine-Sit Bent (Bed Mobility) contact guard  -CS     Sit-Supine Bent (Bed Mobility) standby assist  -CS     Assistive Device (Bed Mobility) bed rails;head of bed elevated  -     Row Name 06/04/22 1414          Transfers    Transfers sit-stand transfer;stand-sit transfer;bed-chair transfer;toilet transfer  -CS     Bed-Chair Bent (Transfers) contact guard  -CS     Assistive Device (Bed-Chair Transfers) walker, front-wheeled  -CS     Sit-Stand Bent (Transfers) contact guard;verbal cues  -CS     Stand-Sit Bent (Transfers) contact guard;verbal cues  -CS     [place] Level (Toilet Transfer) contact guard  -CS     Assistive Device (Toilet Transfer) grab bars/safety frame;raised toilet seat;walker, front-wheeled  -CS     Row Name 06/04/22 1414          Sit-Stand Transfer    Assistive Device (Sit-Stand Transfers) walker, front-wheeled  -     Row Name 06/04/22 1414          Stand-Sit Transfer    Assistive Device (Stand-Sit Transfers) walker, front-wheeled  -     Row Name 06/04/22 1414          Toilet Transfer    Type (Toilet Transfer) sit-stand;stand-sit  -     Row Name 06/04/22 1414          Functional Mobility    Functional Mobility- Ind. Level contact guard assist  -CS     Functional Mobility- Device walker, front-wheeled  -     Row Name 06/04/22 1414          Activities of Daily Living    BADL Assessment/Intervention toileting  -     Row Name 06/04/22 1414          Toileting Assessment/Training    [place] Level (Toileting) toileting skills;contact guard assist  -CS     Assistive Devices (Toileting) grab bar/safety frame;raised toilet seat  -     Position (Toileting) unsupported sitting  -           User Key  (r) = Recorded By, (t) = Taken By, (c) = Cosigned By    Initials Name Provider Type    Trudy Patton COTA Occupational Therapist Assistant               Obj/Interventions     Row Name 06/04/22 1414           Shoulder (Therapeutic Exercise)    Shoulder (Therapeutic Exercise) AROM (active range of motion)  -     Shoulder AROM (Therapeutic Exercise) bilateral;flexion;extension;external rotation;internal rotation  -     Row Name 06/04/22 1414          Elbow/Forearm (Therapeutic Exercise)    Elbow/Forearm (Therapeutic Exercise) AROM (active range of motion)  -     Elbow/Forearm AROM (Therapeutic Exercise) bilateral;flexion;extension;supination;pronation  -     Row Name 06/04/22 1414          Wrist (Therapeutic Exercise)    Wrist (Therapeutic Exercise) AROM (active range of motion)  -     Wrist AROM (Therapeutic Exercise) bilateral;flexion;extension  -     Row Name 06/04/22 1414          Hand (Therapeutic Exercise)    Hand (Therapeutic Exercise) AROM (active range of motion)  -     Hand AROM/AAROM (Therapeutic Exercise) bilateral;finger flexion;finger extension  -     Row Name 06/04/22 1414          Motor Skills    Therapeutic Exercise shoulder;elbow/forearm;wrist;hand  -           User Key  (r) = Recorded By, (t) = Taken By, (c) = Cosigned By    Initials Name Provider Type    CS Trudy Combs COTA Occupational Therapist Assistant               Goals/Plan     Row Name 06/04/22 1414          Transfer Goal 1 (OT)    Activity/Assistive Device (Transfer Goal 1, OT) transfers, all  -CS     [place] Level/Cues Needed (Transfer Goal 1, OT) modified independence  -CS     Time Frame (Transfer Goal 1, OT) long term goal (LTG)  -CS     Progress/Outcome (Transfer Goal 1, OT) goal not met  -     Row Name 06/04/22 1414          Bathing Goal 1 (OT)    Activity/Device (Bathing Goal 1, OT) bathing skills, all  -CS     [place] Level/Cues Needed (Bathing Goal 1, OT) supervision required  -CS     Time Frame (Bathing Goal 1, OT) long term goal (LTG)  -CS     Progress/Outcomes (Bathing Goal 1, OT) goal not met  -[facility] Name 06/04/22 1414          Dressing Goal 1 (OT)    Activity/Device (Dressing Goal 1, OT)  dressing skills, all  -CS     [place]/Cues Needed (Dressing Goal 1, OT) modified independence  -CS     Time Frame (Dressing Goal 1, OT) long term goal (LTG)  -CS     Progress/Outcome (Dressing Goal 1, OT) goal not met  -CS     Row Name 06/04/22 1414          Toileting Goal 1 (OT)    Activity/Device (Toileting Goal 1, OT) toileting skills, all  -CS     [place] Level/Cues Needed (Toileting Goal 1, OT) modified independence  -CS     Time Frame (Toileting Goal 1, OT) long term goal (LTG)  -CS     Progress/Outcome (Toileting Goal 1, OT) goal not met  -CS           User Key  (r) = Recorded By, (t) = Taken By, (c) = Cosigned By    Initials Name Provider Type    CS Trudy Combs COTA Occupational Therapist Assistant               Clinical Impression     Row Name 06/04/22 1414          Pain Assessment    Pretreatment Pain Rating 0/10 - no pain  -CS     Posttreatment Pain Rating 0/10 - no pain  -CS     Row Name 06/04/22 1414          Plan of Care Review    Plan of Care Reviewed With patient  -CS     Progress improving  -CS     Outcome Evaluation Pt tolerated tx well this date. Pt was CGA with bed mobility. Pt was CGA with sit-stand-sit. Pt was CGA with toilet t/f. Pt was CGA with bed-chair t/f. Pt gave good effort with UE ther ex. Continue OT POC.  -CS     Row Name 06/04/22 1414          Therapy Assessment/Plan (OT)    Rehab Potential (OT) good, to achieve stated therapy goals  -CS     Criteria for Skilled Therapeutic Interventions Met (OT) yes;meets criteria  -CS     Row Name 06/04/22 1414          Vital Signs    Pre Patient Position Supine  -CS     Intra Patient Position Sitting  -CS     Post Patient Position Supine  -CS     Row Name 06/04/22 1414          Positioning and Restraints    Pre-Treatment Position in bed  -CS     Post Treatment Position bed  -CS     In Bed fowlers;call light within reach;encouraged to call for assist;exit alarm on;with family/caregiver  -CS           User Key  (r) = Recorded By,  (t) = Taken By, (c) = Cosigned By    Initials Name Provider Type    CS Trudy Combs COTA Occupational Therapist Assistant               Outcome Measures    No documentation.                 Occupational Therapy Education                 Title: PT OT SLP Therapies (In Progress)     Topic: Occupational Therapy (In Progress)     Point: ADL training (Not Started)     Description:   Instruct learner(s) on proper safety adaptation and remediation techniques during self care or transfers.   Instruct in proper use of assistive devices.              Learner Progress:  Not documented in this visit.          Point: Home exercise program (Not Started)     Description:   Instruct learner(s) on appropriate technique for monitoring, assisting and/or progressing therapeutic exercises/activities.              Learner Progress:  Not documented in this visit.          Point: Precautions (Done)     Description:   Instruct learner(s) on prescribed precautions during self-care and functional transfers.              Learning Progress Summary           Patient Acceptance, E, VU by JOMAR at 6/1/2022 1156    Comment: Edu pt on use of gait belt and non skid socks when OOB and no OOB without assist.                   Point: Body mechanics (Not Started)     Description:   Instruct learner(s) on proper positioning and spine alignment during self-care, functional mobility activities and/or exercises.              Learner Progress:  Not documented in this visit.                      User Key     Initials Effective Dates Name Provider Type Discipline     06/16/21 -  Hung Bonds OT Occupational Therapist OT              OT Recommendation and Plan     Plan of Care Review  Plan of Care Reviewed With: patient  Progress: improving  Outcome Evaluation: Pt tolerated tx well this date. Pt was CGA with bed mobility. Pt was CGA with sit-stand-sit. Pt was CGA with toilet t/f. Pt was CGA with bed-chair t/f. Pt gave good effort with UE ther ex. Continue  OT POC.     Time Calculation:    Time Calculation- OT     Row Name 06/04/22 1515             Time Calculation- OT    OT Start Time 1414  -CS      OT Stop Time 1452  -CS      OT Time Calculation (min) 38 min  -CS      Total Timed Code Minutes- OT 38 minute(s)  -CS      OT Received On 06/04/22  -CS              Timed Charges    73003 - OT Therapeutic Exercise Minutes 23  -CS      56752 - OT Self Care/Mgmt Minutes 15  -CS              Total Minutes    Timed Charges Total Minutes 38  -CS       Total Minutes 38  -CS            User Key  (r) = Recorded By, (t) = Taken By, (c) = Cosigned By    Initials Name Provider Type    CS Trudy Combs COTA Occupational Therapist Assistant              Therapy Charges for Today     Code Description Service Date Service Provider Modifiers Qty    83831726304 HC OT THER PROC EA 15 MIN 6/4/2022 Trudy Combs COTA GO 2    43415011900 HC OT SELF CARE/MGMT/TRAIN EA 15 MIN 6/4/2022 Trudy Combs COTA GO 1               DARREN Rodriguez  6/4/2022

## 2022-06-04 NOTE — PLAN OF CARE
Goal Outcome Evaluation:  Plan of Care Reviewed With: patient        Progress: improving  Outcome Evaluation: Pt tolerated tx well this date. Pt was CGA with bed mobility. Pt was CGA with sit-stand-sit. Pt was CGA with toilet t/f. Pt was CGA with bed-chair t/f. Pt gave good effort with UE ther ex. Continue OT POC.

## 2022-06-04 NOTE — PROGRESS NOTES
[facility] Medicine Services  INPATIENT PROGRESS NOTE    Length of Stay: 4  Date of Admission: 5/30/2022  Primary Care Physician: Jean Pierre Scanlon MD    Subjective     Chief Complaint: Cough, shortness of breath    HPI: Patient is more alert today. She states that her shortness of breath is improved. She denies chest pain.    Review of Systems   Constitutional: Positive for fatigue. Negative for activity change, appetite change, chills and fever.   HENT: Negative for congestion, ear pain, rhinorrhea, sore throat and trouble swallowing.    Respiratory: Positive for cough and shortness of breath. Negative for chest tightness and wheezing.    Cardiovascular: Negative for chest pain, palpitations and leg swelling.   Gastrointestinal: Negative for abdominal distention, abdominal pain, diarrhea, nausea and vomiting.   Genitourinary: Negative for difficulty urinating, dysuria and hematuria.   Musculoskeletal: Negative for arthralgias, back pain and myalgias.   Skin: Negative for pallor and rash.   Neurological: Positive for weakness. Negative for dizziness, syncope, light-headedness and headaches.   Hematological: Negative for adenopathy. Does not bruise/bleed easily.   Psychiatric/Behavioral: Negative for agitation and confusion. The patient is not nervous/anxious.      Objective    Temp:  [96.2 °F (35.7 °C)-97.6 °F (36.4 °C)] 96.2 °F (35.7 °C)  Heart Rate:  [] 91  Resp:  [16-18] 16  BP: (106-117)/(58-62) 111/62    Physical Exam  Constitutional:       General: She is not in acute distress.     Appearance: She is ill-appearing. She is not diaphoretic.      Comments: Thin and chronically ill-appearing, malnourished elderly woman.   HENT:      Head: Normocephalic and atraumatic.      Right Ear: External ear normal.      Left Ear: External ear normal.      Nose: No congestion or rhinorrhea.      Mouth/Throat:      Mouth: Mucous membranes are moist.      Pharynx: No oropharyngeal  exudate or posterior oropharyngeal erythema.   Eyes:      General: No scleral icterus.     Extraocular Movements: Extraocular movements intact.      Conjunctiva/sclera: Conjunctivae normal.   Cardiovascular:      Rate and Rhythm: Normal rate and regular rhythm.      Heart sounds: Normal heart sounds. No murmur heard.  Pulmonary:      Effort: Pulmonary effort is normal. No respiratory distress.      Breath sounds: No wheezing, rhonchi or rales.      Comments: Reduced breath sounds globally..  Abdominal:      General: Abdomen is flat. There is no distension.      Palpations: Abdomen is soft.      Tenderness: There is no abdominal tenderness. There is no guarding.   Musculoskeletal:         General: No swelling, tenderness or deformity.      Cervical back: Neck supple. No rigidity. No muscular tenderness.      Right lower leg: No edema.      Left lower leg: No edema.   Lymphadenopathy:      Cervical: No cervical adenopathy.   Skin:     General: Skin is warm and dry.   Neurological:      General: No focal deficit present.      Mental Status: She is alert and oriented to person, place, and time.      Cranial Nerves: No cranial nerve deficit.      Motor: No weakness.   Psychiatric:         Mood and Affect: Mood normal.         Behavior: Behavior normal.         Thought Content: Thought content normal.       Medication Review:    Current Facility-Administered Medications:   •  acetaminophen (TYLENOL) tablet 650 mg, 650 mg, Oral, Q4H PRN, Marlys Mims MD, 650 mg at 05/31/22 1513  •  albuterol (PROVENTIL) nebulizer solution 0.083% 2.5 mg/3mL, 2.5 mg, Nebulization, Q4H PRN, Marlys Mims MD, 2.5 mg at 06/01/22 0241  •  aspirin EC tablet 81 mg, 81 mg, Oral, Daily, Marlys Mims MD, 81 mg at 06/04/22 0901  •  azithromycin (ZITHROMAX) 500 mg in sodium chloride 0.9 % 250 mL IVPB, 500 mg, Intravenous, Once, Marlys Mims MD, Restarted at 05/30/22 1133  •  benzonatate (TESSALON) capsule 100 mg, 100 mg,  Oral, TID PRN, Marlys Mims MD, 100 mg at 06/02/22 2111  •  cefTRIAXone (ROCEPHIN) 1 g/100 mL 0.9% NS (MBP), 1 g, Intravenous, Q24H, Marlys Mims MD, Last Rate: 0 mL/hr at 05/31/22 1120, 1 g at 06/04/22 0901  •  cetirizine (zyrTEC) tablet 5 mg, 5 mg, Oral, Daily, Marlys Mims MD, 5 mg at 06/04/22 0901  •  cholecalciferol (VITAMIN D3) tablet 1,000 Units, 1,000 Units, Oral, Daily, Marlys Mims MD, 1,000 Units at 06/04/22 0901  •  doxycycline (VIBRAMYCIN) 100 mg/100 mL 0.9% NS MBP, 100 mg, Intravenous, Q12H, Marlys Mims MD, 100 mg at 06/04/22 0953  •  guaiFENesin-dextromethorphan (ROBITUSSIN DM) 100-10 MG/5ML syrup 10 mL, 10 mL, Oral, 4x Daily PRN, Marlys Mims MD, 10 mL at 05/31/22 2030  •  ipratropium-albuterol (DUO-NEB) nebulizer solution 3 mL, 3 mL, Nebulization, 4x Daily - RT, Marlys Mims MD, 3 mL at 06/04/22 1450  •  latanoprost (XALATAN) 0.005 % ophthalmic solution 1 drop, 1 drop, Both Eyes, Nightly, Marlys Mims MD, 1 drop at 06/03/22 2054  •  methylPREDNISolone sodium succinate (SOLU-Medrol) injection 40 mg, 40 mg, Intravenous, Q12H, Marlys Mims MD, 40 mg at 06/04/22 1312  •  metoprolol tartrate (LOPRESSOR) tablet 25 mg, 25 mg, Oral, Q12H, Marlys Mims MD, 25 mg at 06/04/22 0901  •  morphine injection 2 mg, 2 mg, Intravenous, Q4H PRN **AND** naloxone (NARCAN) injection 0.4 mg, 0.4 mg, Intravenous, Q5 Min PRN, Marlys Mims MD  •  nitroglycerin (NITRODUR) 0.2 MG/HR patch 1 patch, 1 patch, Transdermal, Daily, Ruddy Tim MD, 1 patch at 06/04/22 0901  •  ondansetron (ZOFRAN) injection 4 mg, 4 mg, Intravenous, Q6H PRN, Marlys Mims MD  •  sertraline (ZOLOFT) tablet 25 mg, 25 mg, Oral, Daily, Marlys Mims MD, 25 mg at 06/04/22 0901  •  sodium chloride 0.9 % flush 10 mL, 10 mL, Intravenous, PRN, Marlys Mims MD  •  sodium chloride 0.9 % flush 10 mL, 10 mL, Intravenous, Q12H, Marlys Mims MD, 10 mL at  06/04/22 0901  •  sodium chloride 0.9 % flush 10 mL, 10 mL, Intravenous, PRN, Marlys Mims MD    I have reviewed the patient's current medications.     Results Review:  I have reviewed the labs, radiology results, and diagnostic studies.    Laboratory Data:   Results from last 7 days   Lab Units 06/04/22  0609 06/03/22  0551 06/02/22  0540 05/31/22  0537 05/30/22  0421   SODIUM mmol/L 139 136 136   < > 136   POTASSIUM mmol/L 5.0 4.5 4.9   < > 4.2   CHLORIDE mmol/L 105 101 103   < > 99   CO2 mmol/L 23.0 22.0 20.0*   < > 25.0   BUN mg/dL 78* 77* 74*   < > 34*   CREATININE mg/dL 1.77* 1.81* 1.94*   < > 1.46*   GLUCOSE mg/dL 120* 143* 172*   < > 185*   CALCIUM mg/dL 9.5 9.7 9.1   < > 9.3   BILIRUBIN mg/dL  --   --   --   --  0.4   ALK PHOS U/L  --   --   --   --  206*   ALT (SGPT) U/L  --   --   --   --  28   AST (SGOT) U/L  --   --   --   --  61*   ANION GAP mmol/L 11.0 13.0 13.0   < > 12.0    < > = values in this interval not displayed.     Estimated Creatinine Clearance: 16.9 mL/min (A) (by C-G formula based on SCr of 1.77 mg/dL (H)).  Results from last 7 days   Lab Units 06/01/22  1518 05/30/22  0421   MAGNESIUM mg/dL 2.6* 2.1         Results from last 7 days   Lab Units 06/04/22  0609 06/03/22  0551 06/02/22  0540 06/01/22  0530 05/31/22  0537   WBC 10*3/mm3 7.19 6.23 7.92 12.68* 14.87*   HEMOGLOBIN g/dL 9.3* 9.2* 10.0* 10.1* 10.6*   HEMATOCRIT % 29.4* 28.2* 30.0* 31.8* 33.2*   PLATELETS 10*3/mm3 326 282 285 340 285           Culture Data:   No results found for: BLOODCX  No results found for: URINECX  No results found for: RESPCX  No results found for: WOUNDCX  No results found for: STOOLCX  No components found for: BODYFLD    Radiology Data:   Imaging Results (Last 24 Hours)     ** No results found for the last 24 hours. **        Assessment/Plan     Hospital Problem List:  Active Problems:    Pneumonia of left lower lobe due to infectious organism    Severe malnutrition (HCC)  Severe chronic disease  related Malnutrition  COPD  Chronic respiratory failure with hypoxia on 3 L of home oxygen  Essential hypertension  History of esophageal cancer s/p chemoradiation  History of glaucoma  CKD  Elevated troponin     Plan  Continue IV antibiotic with ceftriaxone and azithromycin. Continue oxygen by nasal cannula and titrate to keep O2 sat greater than 92%.  Blood cultures negative     Continue bronchodilator nebulizer treatments.  Discontinue Solu-Medrol and start p.o. prednisone 60 mg daily with plans for slow taper.  Continue incentive spirometry    Cardiology input appreciated. Patient had elevated troponin which is likely secondary to type II MI.  She will be started on nitroglycerin patch. Continue aspirin 81 mg daily and p.o. metoprolol 25 mg every 12 hours.     Monitor renal function given CKD history. Hold lisinopril given borderline blood pressures.  Will discontinue Lasix.    PT/OT for disposition planning     DVT prophylaxis with SCDs     CODE STATUS is DNR/DNI    Discharge Planning: In progress    I confirmed that the patient's Advance Care Plan is present, code status is documented, or surrogate decision maker is listed in the patient's medical record.      I have utilized all available immediate resources to obtain, update, or review the patient's current medications.      Marlys Mims MD   06/04/22   14:55 CDT

## 2022-06-04 NOTE — PROGRESS NOTES
[facility] Medicine Services  INPATIENT PROGRESS NOTE    Length of Stay: 3  Date of Admission: 5/30/2022  Primary Care Physician: Jean Pierre Scanlon MD    Subjective     Chief Complaint: Cough, shortness of breath    HPI: Patient is more alert today. She states that her shortness of breath is improved. She denies chest pain.    Review of Systems   Constitutional: Positive for fatigue. Negative for activity change, appetite change, chills and fever.   HENT: Negative for congestion, ear pain, rhinorrhea, sore throat and trouble swallowing.    Respiratory: Positive for cough and shortness of breath. Negative for chest tightness and wheezing.    Cardiovascular: Negative for chest pain, palpitations and leg swelling.   Gastrointestinal: Negative for abdominal distention, abdominal pain, diarrhea, nausea and vomiting.   Genitourinary: Negative for difficulty urinating, dysuria and hematuria.   Musculoskeletal: Negative for arthralgias, back pain and myalgias.   Skin: Negative for pallor and rash.   Neurological: Positive for weakness. Negative for dizziness, syncope, light-headedness and headaches.   Hematological: Negative for adenopathy. Does not bruise/bleed easily.   Psychiatric/Behavioral: Negative for agitation and confusion. The patient is not nervous/anxious.      Objective    Temp:  [96.2 °F (35.7 °C)-97.2 °F (36.2 °C)] 97.2 °F (36.2 °C)  Heart Rate:  [] 89  Resp:  [16-18] 18  BP: ()/(58-73) 106/58    Physical Exam  Constitutional:       General: She is not in acute distress.     Appearance: She is ill-appearing. She is not diaphoretic.      Comments: Thin and chronically ill-appearing, malnourished elderly woman.   HENT:      Head: Normocephalic and atraumatic.      Right Ear: External ear normal.      Left Ear: External ear normal.      Nose: No congestion or rhinorrhea.      Mouth/Throat:      Mouth: Mucous membranes are moist.      Pharynx: No oropharyngeal  exudate or posterior oropharyngeal erythema.   Eyes:      General: No scleral icterus.     Extraocular Movements: Extraocular movements intact.      Conjunctiva/sclera: Conjunctivae normal.   Cardiovascular:      Rate and Rhythm: Normal rate and regular rhythm.      Heart sounds: Normal heart sounds. No murmur heard.  Pulmonary:      Effort: Pulmonary effort is normal. No respiratory distress.      Breath sounds: No wheezing, rhonchi or rales.      Comments: Reduced breath sounds globally..  Abdominal:      General: Abdomen is flat. There is no distension.      Palpations: Abdomen is soft.      Tenderness: There is no abdominal tenderness. There is no guarding.   Musculoskeletal:         General: No swelling, tenderness or deformity.      Cervical back: Neck supple. No rigidity. No muscular tenderness.      Right lower leg: No edema.      Left lower leg: No edema.   Lymphadenopathy:      Cervical: No cervical adenopathy.   Skin:     General: Skin is warm and dry.   Neurological:      General: No focal deficit present.      Mental Status: She is alert and oriented to person, place, and time.      Cranial Nerves: No cranial nerve deficit.      Motor: No weakness.   Psychiatric:         Mood and Affect: Mood normal.         Behavior: Behavior normal.         Thought Content: Thought content normal.       Medication Review:    Current Facility-Administered Medications:   •  acetaminophen (TYLENOL) tablet 650 mg, 650 mg, Oral, Q4H PRN, Marlys Mims MD, 650 mg at 05/31/22 1513  •  albuterol (PROVENTIL) nebulizer solution 0.083% 2.5 mg/3mL, 2.5 mg, Nebulization, Q4H PRN, Marlys Mims MD, 2.5 mg at 06/01/22 0241  •  aspirin EC tablet 81 mg, 81 mg, Oral, Daily, Marlys Mims MD, 81 mg at 06/03/22 0851  •  azithromycin (ZITHROMAX) 500 mg in sodium chloride 0.9 % 250 mL IVPB, 500 mg, Intravenous, Once, Marlys Mims MD, Restarted at 05/30/22 1133  •  benzonatate (TESSALON) capsule 100 mg, 100 mg,  Oral, TID PRN, Marlys Mims MD, 100 mg at 06/02/22 2111  •  cefTRIAXone (ROCEPHIN) 1 g/100 mL 0.9% NS (MBP), 1 g, Intravenous, Q24H, Marlys Mims MD, Last Rate: 0 mL/hr at 05/31/22 1120, 1 g at 06/03/22 0851  •  cetirizine (zyrTEC) tablet 5 mg, 5 mg, Oral, Daily, Marlys Mims MD, 5 mg at 06/03/22 0851  •  cholecalciferol (VITAMIN D3) tablet 1,000 Units, 1,000 Units, Oral, Daily, Marlys Mims MD, 1,000 Units at 06/03/22 0851  •  doxycycline (VIBRAMYCIN) 100 mg/100 mL 0.9% NS MBP, 100 mg, Intravenous, Q12H, Marlys Mims MD, 100 mg at 06/03/22 0936  •  furosemide (LASIX) injection 20 mg, 20 mg, Intravenous, Daily, Marlys Mims MD, 20 mg at 06/03/22 0851  •  guaiFENesin-dextromethorphan (ROBITUSSIN DM) 100-10 MG/5ML syrup 10 mL, 10 mL, Oral, 4x Daily PRN, Marlys Mims MD, 10 mL at 05/31/22 2030  •  ipratropium-albuterol (DUO-NEB) nebulizer solution 3 mL, 3 mL, Nebulization, 4x Daily - RT, Marlys Mims MD, 3 mL at 06/03/22 1446  •  latanoprost (XALATAN) 0.005 % ophthalmic solution 1 drop, 1 drop, Both Eyes, Nightly, Marlys Mims MD, 1 drop at 06/02/22 2111  •  methylPREDNISolone sodium succinate (SOLU-Medrol) injection 40 mg, 40 mg, Intravenous, Q12H, Marlys Mims MD, 40 mg at 06/03/22 1144  •  metoprolol tartrate (LOPRESSOR) tablet 25 mg, 25 mg, Oral, Q12H, Marlys Mims MD, 25 mg at 06/03/22 0851  •  morphine injection 2 mg, 2 mg, Intravenous, Q4H PRN **AND** naloxone (NARCAN) injection 0.4 mg, 0.4 mg, Intravenous, Q5 Min PRN, Marlys Mims MD  •  nitroglycerin (NITRODUR) 0.2 MG/HR patch 1 patch, 1 patch, Transdermal, Daily, Ruddy Tim MD, 1 patch at 06/03/22 1043  •  ondansetron (ZOFRAN) injection 4 mg, 4 mg, Intravenous, Q6H PRN, Marlys Mims MD  •  sertraline (ZOLOFT) tablet 25 mg, 25 mg, Oral, Daily, Marlys Mims MD, 25 mg at 06/03/22 0851  •  sodium chloride 0.9 % flush 10 mL, 10 mL, Intravenous, PRN, Prasanna  Marlys JEREZ MD  •  sodium chloride 0.9 % flush 10 mL, 10 mL, Intravenous, Q12H, Marlys Mims MD, 10 mL at 06/03/22 0852  •  sodium chloride 0.9 % flush 10 mL, 10 mL, Intravenous, PRN, Marlys Mims MD    I have reviewed the patient's current medications.     Results Review:  I have reviewed the labs, radiology results, and diagnostic studies.    Laboratory Data:   Results from last 7 days   Lab Units 06/03/22  0551 06/02/22  0540 06/01/22  0530 05/31/22  0537 05/30/22  0421   SODIUM mmol/L 136 136 138   < > 136   POTASSIUM mmol/L 4.5 4.9 4.8   < > 4.2   CHLORIDE mmol/L 101 103 102   < > 99   CO2 mmol/L 22.0 20.0* 23.0   < > 25.0   BUN mg/dL 77* 74* 58*   < > 34*   CREATININE mg/dL 1.81* 1.94* 1.71*   < > 1.46*   GLUCOSE mg/dL 143* 172* 153*   < > 185*   CALCIUM mg/dL 9.7 9.1 10.0   < > 9.3   BILIRUBIN mg/dL  --   --   --   --  0.4   ALK PHOS U/L  --   --   --   --  206*   ALT (SGPT) U/L  --   --   --   --  28   AST (SGOT) U/L  --   --   --   --  61*   ANION GAP mmol/L 13.0 13.0 13.0   < > 12.0    < > = values in this interval not displayed.     Estimated Creatinine Clearance: 16.3 mL/min (A) (by C-G formula based on SCr of 1.81 mg/dL (H)).  Results from last 7 days   Lab Units 06/01/22  1518 05/30/22  0421   MAGNESIUM mg/dL 2.6* 2.1         Results from last 7 days   Lab Units 06/03/22  0551 06/02/22  0540 06/01/22  0530 05/31/22  0537 05/30/22  0421   WBC 10*3/mm3 6.23 7.92 12.68* 14.87* 11.60*   HEMOGLOBIN g/dL 9.2* 10.0* 10.1* 10.6* 10.1*   HEMATOCRIT % 28.2* 30.0* 31.8* 33.2* 30.8*   PLATELETS 10*3/mm3 282 285 340 285 260           Culture Data:   No results found for: BLOODCX  No results found for: URINECX  No results found for: RESPCX  No results found for: WOUNDCX  No results found for: STOOLCX  No components found for: BODYFLD    Radiology Data:   Imaging Results (Last 24 Hours)     ** No results found for the last 24 hours. **        Assessment/Plan     Hospital Problem List:  Active Problems:     Pneumonia of left lower lobe due to infectious organism    Severe malnutrition (HCC)  Severe chronic disease related Malnutrition  COPD  Chronic respiratory failure with hypoxia on 3 L of home oxygen  Essential hypertension  History of esophageal cancer s/p chemoradiation  History of glaucoma  CKD  Elevated troponin     Plan  Continue IV antibiotic with ceftriaxone and azithromycin. Continue oxygen by nasal cannula and titrate to keep O2 sat greater than 92%.  Blood cultures negative     Continue bronchodilator nebulizer treatments.  Continue IV Solu-Medrol 40 mg every 12 hours.  Start incentive spirometry    Cardiology input appreciated. Patient had elevated troponin which is likely secondary to type II MI.  She will be started on nitroglycerin patch. Continue aspirin 81 mg daily and p.o. metoprolol 25 mg every 12 hours.     Monitor renal function given CKD history. Hold lisinopril given borderline blood pressures.  Continue IV Lasix 20 mg daily.    PT/OT for disposition planning     DVT prophylaxis with SCDs     CODE STATUS is DNR/DNI    Discharge Planning: In progress    I confirmed that the patient's Advance Care Plan is present, code status is documented, or surrogate decision maker is listed in the patient's medical record.      I have utilized all available immediate resources to obtain, update, or review the patient's current medications.      Marlys Mims MD   06/03/22   19:03 CDT

## 2022-06-04 NOTE — PLAN OF CARE
Goal Outcome Evaluation:           Progress: improving  Outcome Evaluation: Pt has rested well through the night. Continuing iv antibiotics and doing well on 2l per nc of O2. Vitals stable. Will continue to monitor

## 2022-06-04 NOTE — PLAN OF CARE
Goal Outcome Evaluation: Patient states that she is feeling much better today. Family at bedside states that they think the patient would benefit from an outpatient rehab stay after discharge to regain strength, the patient currently lives at home alone. VS stable. No distress noted.

## 2022-06-05 NOTE — PLAN OF CARE
Goal Outcome Evaluation:  Plan of Care Reviewed With: patient        Progress: improving  Outcome Evaluation: Pt tatum tx well with good participation. O2 remained 100% throughout entire tx. Pt t/f sup-sit with SBAx1, Sit-stand-sit with SBAx1. Pt amb 240' with FWRW with SBAx1. Pt up in chair post tx.

## 2022-06-05 NOTE — THERAPY TREATMENT NOTE
Acute Care - Physical Therapy Treatment Note  [facility]     Patient Name: Tamara Singh  : 1940  MRN: [id number]  Today's Date: 2022      Visit Dx:     ICD-10-CM ICD-9-CM   1. Pneumonia of left lower lobe due to infectious organism  J18.9 486   2. Acute on chronic respiratory failure with hypoxia and hypercapnia (HCC)  J96.21 518.84    J96.22 786.09     799.02   3. Dysphagia, unspecified type  R13.10 787.20   4. Impaired functional mobility, balance, gait, and endurance  Z74.09 V49.89   5. Impaired mobility and activities of daily living  Z74.09 V49.89    Z78.9      Patient Active Problem List   Diagnosis   • Nuclear sclerosis   • Artificial lens present   • Borderline glaucoma   • Corneal endothelial dystrophy   • Pseudophakia   • Cortical age-related cataract   • Nuclear cataract   • History of esophageal cancer   • Acute kidney injury (HCC)   • Pneumonia of left lower lobe due to infectious organism   • Severe malnutrition (HCC)     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)    • Nuclear cataract    • Perleche    • Pneumathemia (HCC)     UNSPECIFIED ORGANISM   • Shoulder joint pain    • Upper respiratory infection    • Vitamin D deficiency    • Wheezing      Past Surgical History:   Procedure Laterality Date   • CATARACT EXTRACTION  2015    Remove cataract, insert lens (Left eye.)   • CHOLECYSTECTOMY   07/14/2004   • COLONOSCOPY  05/23/2013    Colon endoscopy 11217 (Internal & external hemorrhoids found.)   • ENDOSCOPY  11/26/2014    EGD w/ biopsy 92921 (Normal esophagus. Biopsy taken. Normal stomach. Normal duodenum.)   11/26/2014 GURPREET BHAT    • ENDOSCOPY  03/19/2014    EGD w/ tube 92949 (Esophagitis seen. Biopsy taken. No evidence of esophageal ca. Gastritis in stomach. Biopsy taken. Normal duodenum.)   • ENDOSCOPY  10/02/2013    EGD w/ tube 34960 (Normal esopahgus, stoamch, & duodenum. No evidence of tumor. Biopsy taken.)   • ENDOSCOPY  05/23/2013    EGD w/ tube 68642 (Tumor in distal 3rd of esophagus. Biopsy taken. Lesion runs from 28 cms to 34 cms. Normal stomach & duodenum.)   • INJECTION OF MEDICATION  01/25/2016    Kenalog (5)      • OTHER SURGICAL HISTORY  12/10/2014    Drain/Inject Major Joint 20610 (Shoulder joint pain)    • OTHER SURGICAL HISTORY      OCT DISC NFL 38708 (Primary open angle glaucoma (2): 7/26/2016, 6/16/2015   • OTHER SURGICAL HISTORY  07/26/2016    OPTIC NERVE HEAD EVAL PERFORMED 2027F (Primary open angle glaucoma)      PT Assessment (last 12 hours)     PT Evaluation and Treatment     Row Name 06/05/22 1039          Physical Therapy Time and Intention    Subjective Information no complaints  -EM     Document Type therapy note (daily note)  -EM     Mode of Treatment individual therapy;physical therapy  -EM     Patient Effort good  -EM     Row Name 06/05/22 1039          Pain    Pretreatment Pain Rating 0/10 - no pain  -EM     Posttreatment Pain Rating 0/10 - no pain  -EM     Row Name 06/05/22 1039          Cognition    Affect/Mental Status (Cognition) WFL  -EM     Orientation Status (Cognition) oriented x 3  -EM     Follows Commands (Cognition) WFL  -EM     Personal Safety Interventions fall prevention program maintained;gait belt;nonskid shoes/slippers when out of bed;supervised activity  -EM     Row Name 06/05/22 1039          Bed Mobility    Supine-Sit [place] (Bed Mobility)  standby assist  -EM     Sit-Supine Butler (Bed Mobility) standby assist  -EM     Assistive Device (Bed Mobility) bed rails;head of bed elevated  -EM     Row Name 06/05/22 1039          Transfers    Sit-Stand Butler (Transfers) standby assist  -EM     Stand-Sit Butler (Transfers) standby assist  -EM     Row Name 06/05/22 1039          Sit-Stand Transfer    Assistive Device (Sit-Stand Transfers) walker, front-wheeled  -EM     Row Name 06/05/22 1039          Stand-Sit Transfer    Assistive Device (Stand-Sit Transfers) walker, front-wheeled  -EM     Row Name 06/05/22 1039          Gait/Stairs (Locomotion)    Butler Level (Gait) standby assist  -EM     Assistive Device (Gait) walker, front-wheeled  -EM     Distance in Feet (Gait) 240'  -EM     Pattern (Gait) step-through  -EM     Row Name 06/05/22 1039          Vital Signs    Pre Systolic BP Rehab 117  -EM     Pre Treatment Diastolic BP 59  -EM     Pretreatment Heart Rate (beats/min) 87  -EM     Posttreatment Heart Rate (beats/min) 91  -EM     Pre SpO2 (%) 100  -EM     O2 Delivery Pre Treatment supplemental O2  -EM     Intra SpO2 (%) 100  -EM     O2 Delivery Intra Treatment supplemental O2  -EM     Pre Patient Position Supine  -EM     Intra Patient Position Standing  -EM     Post Patient Position Sitting  -EM     Row Name 06/05/22 1039          Positioning and Restraints    Pre-Treatment Position in bed  -EM     Post Treatment Position chair  -EM     In Chair call light within reach;encouraged to call for assist  -EM     Row Name 06/05/22 1039          Bed Mobility Goal 1 (PT)    Activity/Assistive Device (Bed Mobility Goal 1, PT) sit to supine/supine to sit  -EM     Butler Level/Cues Needed (Bed Mobility Goal 1, PT) modified independence  -EM     Time Frame (Bed Mobility Goal 1, PT) by discharge  -EM     Progress/Outcomes (Bed Mobility Goal 1, PT) goal not met  -EM     Row Name 06/05/22 1039          Transfer Goal 1 (PT)     Activity/Assistive Device (Transfer Goal 1, PT) sit-to-stand/stand-to-sit;bed-to-chair/chair-to-bed;walker, rolling  -EM     [place] Level/Cues Needed (Transfer Goal 1, PT) modified independence  -EM     Time Frame (Transfer Goal 1, PT) by discharge  -EM     Strategies/Barriers (Transfers Goal 1, PT) Maintain SpO2 >90%.  -EM     Progress/Outcome (Transfer Goal 1, PT) goal not met  -EM     Row Name 06/05/22 1039          Gait Training Goal 1 (PT)    Activity/Assistive Device (Gait Training Goal 1, PT) walker, rolling  -EM     [place] Level (Gait Training Goal 1, PT) modified independence  -EM     Distance (Gait Training Goal 1, PT) 75' x 2.  -EM     Time Frame (Gait Training Goal 1, PT) by discharge  -EM     Strategies/Barriers (Gait Training Goal 1, PT) Maintain SpO2 >90%.  -EM     Progress/Outcome (Gait Training Goal 1, PT) goal not met  -EM     Row Name 06/05/22 1039          Problem Specific Goal 1 (PT)    Problem Specific Goal 1 (PT) Score 24/28 on Tinetti fall risk assessment.  -EM     Time Frame (Problem Specific Goal 1, PT) by discharge  -EM     Strategies/Barriers (Problem Specific Goal 1, PT) Maintain SpO2 >90%.  -EM     Progress/Outcome (Problem Specific Goal 1, PT) goal not met  -EM           User Key  (r) = Recorded By, (t) = Taken By, (c) = Cosigned By    Initials Name Provider Type    EM Alan Islas, PTA Physical Therapist Assistant                Physical Therapy Education                 Title: PT OT SLP Therapies (In Progress)     Topic: Physical Therapy (In Progress)     Point: Mobility training (Done)     Learning Progress Summary           Patient Acceptance, E, VU,NR by CZ at 6/1/2022 1003    Comment: PT POC, proper use of walker, HHPT.   Family Acceptance, E, VU,NR by CZ at 6/1/2022 1003    Comment: PT POC, proper use of walker, HHPT.                   Point: Home exercise program (Not Started)     Learner Progress:  Not documented in this visit.          Point: Body mechanics (Not  Started)     Learner Progress:  Not documented in this visit.          Point: Precautions (Not Started)     Learner Progress:  Not documented in this visit.                      User Key     Initials Effective Dates Name Provider Type Discipline    CZ 06/16/21 -  David Ruelas, PT Physical Therapist PT              PT Recommendation and Plan  Anticipated Discharge Disposition (PT): home with assist, home with home health  Plan of Care Reviewed With: patient  Progress: improving  Outcome Evaluation: Pt tatum tx well with good participation. O2 remained 100% throughout entire tx. Pt t/f sup-sit with SBAx1, Sit-stand-sit with SBAx1. Pt amb 240' with FWRW with SBAx1. Pt up in chair post tx.       Time Calculation:    PT Charges     Row Name 06/05/22 1212             Time Calculation    Start Time 1039  -EM      Stop Time 1102  -EM      Time Calculation (min) 23 min  -EM              Time Calculation- PT    Total Timed Code Minutes- PT 23 minute(s)  -EM            User Key  (r) = Recorded By, (t) = Taken By, (c) = Cosigned By    Initials Name Provider Type    EM Alan Islas PTA Physical Therapist Assistant              Therapy Charges for Today     Code Description Service Date Service Provider Modifiers Qty    67270370571 HC PT THERAPEUTIC ACT EA 15 MIN 6/5/2022 Alan Islas PTA GP, CQ 1    10037612624 HC GAIT TRAINING EA 15 MIN 6/5/2022 Alan Islas PTA GP, CQ 1          PT G-Codes  Outcome Measure Options: AM-PAC 6 Clicks Daily Activity (OT)  AM-PAC 6 Clicks Score (PT): 18  AM-PAC 6 Clicks Score (OT): 18    Alan Islas PTA  6/5/2022

## 2022-06-05 NOTE — PLAN OF CARE
Goal Outcome Evaluation:           Progress: no change  Outcome Evaluation: Pt rested well through the night. IV access was lost, attempted x2 to restart and then patient request it not to be restarted. Vitals stable. Will continue to monitor

## 2022-06-05 NOTE — PLAN OF CARE
Goal Outcome Evaluation: Patient lost IV access overnight, New IV attempted and unsuccessful, MD aware no IV in place and antibiotics switched to PO. No complaints of pain. Patient has sat up in the chair most of the day. Patient ate 100% of breakfast and 50% of lunch. Patient also worked with therapy today and walked around the unit with only little fatigue. Patient eager to DC, family at bedside states she has help available in the home and they will be there to help multiple times a day.

## 2022-06-05 NOTE — PROGRESS NOTES
[facility] Medicine Services  INPATIENT PROGRESS NOTE    Length of Stay: 5  Date of Admission: 5/30/2022  Primary Care Physician: Jean Pierre Scanlon MD    Subjective     Chief Complaint: Cough, shortness of breath    HPI: Patient feels much improved today.  Her fatigue is also improving.  Cough is resolved.    Review of Systems   Constitutional: Positive for fatigue. Negative for activity change, appetite change, chills and fever.   HENT: Negative for congestion, ear pain, rhinorrhea, sore throat and trouble swallowing.    Respiratory: Positive for shortness of breath. Negative for cough, chest tightness and wheezing.    Cardiovascular: Negative for chest pain, palpitations and leg swelling.   Gastrointestinal: Negative for abdominal distention, abdominal pain, diarrhea, nausea and vomiting.   Genitourinary: Negative for difficulty urinating, dysuria and hematuria.   Musculoskeletal: Negative for arthralgias, back pain and myalgias.   Skin: Negative for pallor and rash.   Neurological: Positive for weakness. Negative for dizziness, syncope, light-headedness and headaches.   Hematological: Negative for adenopathy. Does not bruise/bleed easily.   Psychiatric/Behavioral: Negative for agitation and confusion. The patient is not nervous/anxious.      Objective    Temp:  [96.9 °F (36.1 °C)-97 °F (36.1 °C)] 97 °F (36.1 °C)  Heart Rate:  [74-93] 74  Resp:  [16-22] 20  BP: ()/(56-68) 125/68    Physical Exam  Constitutional:       General: She is not in acute distress.     Appearance: She is ill-appearing. She is not diaphoretic.      Comments: Thin and chronically ill-appearing, malnourished elderly woman.   HENT:      Head: Normocephalic and atraumatic.      Right Ear: External ear normal.      Left Ear: External ear normal.      Nose: No congestion or rhinorrhea.      Mouth/Throat:      Mouth: Mucous membranes are moist.      Pharynx: No oropharyngeal exudate or posterior  oropharyngeal erythema.   Eyes:      General: No scleral icterus.     Extraocular Movements: Extraocular movements intact.      Conjunctiva/sclera: Conjunctivae normal.   Cardiovascular:      Rate and Rhythm: Normal rate and regular rhythm.      Heart sounds: Normal heart sounds. No murmur heard.  Pulmonary:      Effort: Pulmonary effort is normal. No respiratory distress.      Breath sounds: No wheezing, rhonchi or rales.      Comments: Reduced breath sounds globally..  Abdominal:      General: Abdomen is flat. There is no distension.      Palpations: Abdomen is soft.      Tenderness: There is no abdominal tenderness. There is no guarding.   Musculoskeletal:         General: No swelling, tenderness or deformity.      Cervical back: Neck supple. No rigidity. No muscular tenderness.      Right lower leg: No edema.      Left lower leg: No edema.   Lymphadenopathy:      Cervical: No cervical adenopathy.   Skin:     General: Skin is warm and dry.   Neurological:      General: No focal deficit present.      Mental Status: She is alert and oriented to person, place, and time.      Cranial Nerves: No cranial nerve deficit.      Motor: No weakness.   Psychiatric:         Mood and Affect: Mood normal.         Behavior: Behavior normal.         Thought Content: Thought content normal.       Medication Review:    Current Facility-Administered Medications:   •  acetaminophen (TYLENOL) tablet 650 mg, 650 mg, Oral, Q4H PRN, Marlys Mims MD, 650 mg at 05/31/22 1513  •  albuterol (PROVENTIL) nebulizer solution 0.083% 2.5 mg/3mL, 2.5 mg, Nebulization, Q4H PRN, Marlys Mims MD, 2.5 mg at 06/01/22 0241  •  aspirin EC tablet 81 mg, 81 mg, Oral, Daily, Marlys Mims MD, 81 mg at 06/05/22 0904  •  azithromycin (ZITHROMAX) 500 mg in sodium chloride 0.9 % 250 mL IVPB, 500 mg, Intravenous, Once, Marlys Mims MD, Restarted at 05/30/22 1133  •  benzonatate (TESSALON) capsule 100 mg, 100 mg, Oral, TID PRN, Prasanna  Marlys JEREZ MD, 100 mg at 06/02/22 2111  •  cefuroxime (CEFTIN) tablet 500 mg, 500 mg, Oral, Q12H, Marlys Mims MD, 500 mg at 06/05/22 1147  •  cetirizine (zyrTEC) tablet 5 mg, 5 mg, Oral, Daily, Marlys Mims MD, 5 mg at 06/05/22 0904  •  cholecalciferol (VITAMIN D3) tablet 1,000 Units, 1,000 Units, Oral, Daily, Marlys Mims MD, 1,000 Units at 06/05/22 0904  •  doxycycline (MONODOX) capsule 100 mg, 100 mg, Oral, Q12H, Marlys Mims MD, 100 mg at 06/05/22 1147  •  guaiFENesin-dextromethorphan (ROBITUSSIN DM) 100-10 MG/5ML syrup 10 mL, 10 mL, Oral, 4x Daily PRN, Marlys Mims MD, 10 mL at 05/31/22 2030  •  ipratropium-albuterol (DUO-NEB) nebulizer solution 3 mL, 3 mL, Nebulization, 4x Daily - RT, Marlys Mims MD, 3 mL at 06/05/22 1413  •  latanoprost (XALATAN) 0.005 % ophthalmic solution 1 drop, 1 drop, Both Eyes, Nightly, Marlys Mims MD, 1 drop at 06/04/22 2030  •  metoprolol tartrate (LOPRESSOR) tablet 25 mg, 25 mg, Oral, Q12H, Marlys Mims MD, 25 mg at 06/05/22 0904  •  morphine injection 2 mg, 2 mg, Intravenous, Q4H PRN **AND** naloxone (NARCAN) injection 0.4 mg, 0.4 mg, Intravenous, Q5 Min PRN, Marlys Mims MD  •  nitroglycerin (NITRODUR) 0.2 MG/HR patch 1 patch, 1 patch, Transdermal, Daily, Ruddy Tim MD, 1 patch at 06/05/22 0904  •  ondansetron (ZOFRAN) injection 4 mg, 4 mg, Intravenous, Q6H PRN, Marlys Mims MD  •  predniSONE (DELTASONE) tablet 60 mg, 60 mg, Oral, Daily With Breakfast, Marlys Mims MD, 60 mg at 06/05/22 0904  •  sertraline (ZOLOFT) tablet 25 mg, 25 mg, Oral, Daily, Marlys Mims MD, 25 mg at 06/05/22 0904  •  sodium chloride 0.9 % flush 10 mL, 10 mL, Intravenous, PRN, Marlys Mims MD  •  sodium chloride 0.9 % flush 10 mL, 10 mL, Intravenous, Q12H, Marlys Mims MD, 10 mL at 06/04/22 2029  •  sodium chloride 0.9 % flush 10 mL, 10 mL, Intravenous, PRN, Marlys Mims MD    I have reviewed  the patient's current medications.     Results Review:  I have reviewed the labs, radiology results, and diagnostic studies.    Laboratory Data:   Results from last 7 days   Lab Units 06/05/22  0607 06/04/22  0609 06/03/22  0551 05/31/22  0537 05/30/22  0421   SODIUM mmol/L 143 139 136   < > 136   POTASSIUM mmol/L 5.3* 5.0 4.5   < > 4.2   CHLORIDE mmol/L 107 105 101   < > 99   CO2 mmol/L 25.0 23.0 22.0   < > 25.0   BUN mg/dL 75* 78* 77*   < > 34*   CREATININE mg/dL 1.60* 1.77* 1.81*   < > 1.46*   GLUCOSE mg/dL 118* 120* 143*   < > 185*   CALCIUM mg/dL 9.2 9.5 9.7   < > 9.3   BILIRUBIN mg/dL  --   --   --   --  0.4   ALK PHOS U/L  --   --   --   --  206*   ALT (SGPT) U/L  --   --   --   --  28   AST (SGOT) U/L  --   --   --   --  61*   ANION GAP mmol/L 11.0 11.0 13.0   < > 12.0    < > = values in this interval not displayed.     Estimated Creatinine Clearance: 19.1 mL/min (A) (by C-G formula based on SCr of 1.6 mg/dL (H)).  Results from last 7 days   Lab Units 06/01/22  1518 05/30/22  0421   MAGNESIUM mg/dL 2.6* 2.1         Results from last 7 days   Lab Units 06/05/22  0606 06/04/22  0609 06/03/22  0551 06/02/22  0540 06/01/22  0530   WBC 10*3/mm3 9.41 7.19 6.23 7.92 12.68*   HEMOGLOBIN g/dL 9.4* 9.3* 9.2* 10.0* 10.1*   HEMATOCRIT % 29.3* 29.4* 28.2* 30.0* 31.8*   PLATELETS 10*3/mm3 343 326 282 285 340           Culture Data:   No results found for: BLOODCX  No results found for: URINECX  No results found for: RESPCX  No results found for: WOUNDCX  No results found for: STOOLCX  No components found for: BODYFLD    Radiology Data:   Imaging Results (Last 24 Hours)     ** No results found for the last 24 hours. **        Assessment/Plan     Hospital Problem List:  Active Problems:    Pneumonia of left lower lobe due to infectious organism    Severe malnutrition (HCC)  Severe chronic disease related Malnutrition  COPD  Chronic respiratory failure with hypoxia on 3 L of home oxygen  Essential hypertension  History of  esophageal cancer s/p chemoradiation  History of glaucoma  CKD  Elevated troponin     Plan  Patient is much improved.  Will discontinue IV ceftriaxone and doxycycline.  Start p.o cefuroxime and p.o. doxycycline to complete total of 7 days of antibiotic therapy. Continue oxygen by nasal cannula and titrate to keep O2 sat greater than 92%.  Patient is on 3 L of oxygen at baseline.  Okay to leave IV line out.     Continue bronchodilator nebulizer treatments.  Continue p.o. prednisone 60 mg daily with plans for slow taper.  Continue incentive spirometry    Cardiology input appreciated. Patient had elevated troponin which is likely secondary to type II MI.  She will be started on nitroglycerin patch. Continue aspirin 81 mg daily and p.o. metoprolol 25 mg every 12 hours.     Monitor renal function given CKD history. Hold lisinopril given borderline blood pressures.  Will discontinue Lasix.    PT/OT for disposition planning     DVT prophylaxis with SCDs     CODE STATUS is DNR/DNI    Discharge Planning: I expect patient to be discharged in the next 1 to 2 days home with home health versus SNF    I confirmed that the patient's Advance Care Plan is present, code status is documented, or surrogate decision maker is listed in the patient's medical record.      I have utilized all available immediate resources to obtain, update, or review the patient's current medications.      Marlys Mims MD   06/05/22   15:10 CDT

## 2022-06-06 PROBLEM — I50.22 CHRONIC SYSTOLIC CHF (CONGESTIVE HEART FAILURE) (HCC): Status: ACTIVE | Noted: 2022-01-01

## 2022-06-06 NOTE — DISCHARGE SUMMARY
[facility] Medicine Services  DISCHARGE SUMMARY       Date of Admission: 5/30/2022  Date of Discharge:  6/6/2022  Primary Care Physician: Jean Pierre Scanlon MD    Presenting Problem/History of Present Illness:  Pneumonia of left lower lobe due to infectious organism [J18.9]  Acute on chronic respiratory failure with hypoxia and hypercapnia (HCC) [J96.21, J96.22]     Final Discharge Diagnoses:  Active Hospital Problems    Diagnosis    • Chronic systolic CHF (congestive heart failure) (HCC)    • Severe malnutrition (HCC)    • Pneumonia of left lower lobe due to infectious organism        Consults:   Consults     Date and Time Order Name Status Description    6/2/2022  1:54 PM Inpatient Cardiology Consult Completed     5/30/2022  8:51 AM Hospitalist (on-call MD unless specified)            Procedures Performed:                 Pertinent Test Results:   Lab Results (most recent)     Procedure Component Value Units Date/Time    Basic Metabolic Panel [[id number]]  (Abnormal) Collected: 06/06/22 0551    Specimen: Blood Updated: 06/06/22 0638     Glucose 105 mg/dL      BUN 67 mg/dL      Creatinine 1.40 mg/dL      Sodium 143 mmol/L      Potassium 4.8 mmol/L      Chloride 107 mmol/L      CO2 25.0 mmol/L      Calcium 9.3 mg/dL      BUN/Creatinine Ratio 47.9     Anion Gap 11.0 mmol/L      eGFR 37.9 mL/min/1.73      Comment: National Kidney Foundation and American Society of Nephrology (ASN) Task Force recommended calculation based on the Chronic Kidney Disease Epidemiology Collaboration (CKD-EPI) equation refit without adjustment for race.       Narrative:      GFR Normal >60  Chronic Kidney Disease <60  Kidney Failure <15      CBC & Differential [[id number]]  (Abnormal) Collected: 06/06/22 0551    Specimen: Blood Updated: 06/06/22 0626    Narrative:      The following orders were created for panel order CBC & Differential.  Procedure                               Abnormality         Status                      ---------                               -----------         ------                     CBC Auto Differential[[id number]]        Abnormal            Final result                 Please view results for these tests on the individual orders.    CBC Auto Differential [[id number]]  (Abnormal) Collected: 06/06/22 0551    Specimen: Blood Updated: 06/06/22 0626     WBC 10.47 10*3/mm3      RBC 3.39 10*6/mm3      Hemoglobin 10.5 g/dL      Hematocrit 32.6 %      MCV 96.2 fL      MCH 31.0 pg      MCHC 32.2 g/dL      RDW 14.8 %      RDW-SD 52.0 fl      MPV 9.2 fL      Platelets 320 10*3/mm3      Neutrophil % 83.5 %      Lymphocyte % 6.8 %      Monocyte % 5.4 %      Eosinophil % 0.0 %      Basophil % 0.4 %      Immature Grans % 3.9 %      Neutrophils, Absolute 8.74 10*3/mm3      Lymphocytes, Absolute 0.71 10*3/mm3      Monocytes, Absolute 0.57 10*3/mm3      Eosinophils, Absolute 0.00 10*3/mm3      Basophils, Absolute 0.04 10*3/mm3      Immature Grans, Absolute 0.41 10*3/mm3      nRBC 0.3 /100 WBC     Basic Metabolic Panel [[id number]]  (Abnormal) Collected: 06/05/22 0607    Specimen: Blood Updated: 06/05/22 0709     Glucose 118 mg/dL      BUN 75 mg/dL      Creatinine 1.60 mg/dL      Sodium 143 mmol/L      Potassium 5.3 mmol/L      Chloride 107 mmol/L      CO2 25.0 mmol/L      Calcium 9.2 mg/dL      BUN/Creatinine Ratio 46.9     Anion Gap 11.0 mmol/L      eGFR 32.3 mL/min/1.73      Comment: National Kidney Foundation and American Society of Nephrology (ASN) Task Force recommended calculation based on the Chronic Kidney Disease Epidemiology Collaboration (CKD-EPI) equation refit without adjustment for race.       Narrative:      GFR Normal >60  Chronic Kidney Disease <60  Kidney Failure <15      CBC & Differential [[id number]]  (Abnormal) Collected: 06/05/22 0606    Specimen: Blood Updated: 06/05/22 0641    Narrative:      The following orders were created for panel order CBC & Differential.  Procedure                                Abnormality         Status                     ---------                               -----------         ------                     CBC Auto Differential[[id number]]        Abnormal            Final result                 Please view results for these tests on the individual orders.    CBC Auto Differential [[id number]]  (Abnormal) Collected: 06/05/22 0606    Specimen: Blood Updated: 06/05/22 0641     WBC 9.41 10*3/mm3      RBC 3.04 10*6/mm3      Hemoglobin 9.4 g/dL      Hematocrit 29.3 %      MCV 96.4 fL      MCH 30.9 pg      MCHC 32.1 g/dL      RDW 14.8 %      RDW-SD 51.7 fl      MPV 9.2 fL      Platelets 343 10*3/mm3      Neutrophil % 80.6 %      Lymphocyte % 8.3 %      Monocyte % 6.2 %      Eosinophil % 0.0 %      Basophil % 0.2 %      Immature Grans % 4.7 %      Neutrophils, Absolute 7.59 10*3/mm3      Lymphocytes, Absolute 0.78 10*3/mm3      Monocytes, Absolute 0.58 10*3/mm3      Eosinophils, Absolute 0.00 10*3/mm3      Basophils, Absolute 0.02 10*3/mm3      Immature Grans, Absolute 0.44 10*3/mm3      nRBC 0.3 /100 WBC     Blood Culture - Blood, Arm, Left [[id number]]  (Normal) Collected: 05/30/22 0948    Specimen: Blood from Arm, Left Updated: 06/04/22 1004     Blood Culture No growth at 5 days    Blood Culture - Blood, Arm, Left [[id number]]  (Normal) Collected: 05/30/22 0522    Specimen: Blood from Arm, Left Updated: 06/04/22 0533     Blood Culture No growth at 5 days    Blood Gas, Arterial - [[id number]]  (Abnormal) Collected: 06/01/22 2019    Specimen: Arterial Blood Updated: 06/01/22 2022     Site Right Brachial     Ruiz's Test N/A     pH, Arterial 7.219 pH units      Comment: 85 Value below critical limit        pCO2, Arterial 59.1 mm Hg      Comment: 83 Value above reference range        pO2, Arterial 129.0 mm Hg      Comment: 83 Value above reference range        HCO3, Arterial 24.1 mmol/L      Base Excess, Arterial -3.9 mmol/L      Comment: 84 Value below reference range        O2  Saturation, Arterial 99.3 %      Comment: 83 Value above reference range        Barometric Pressure for Blood Gas 744 mmHg      Modality Nasal Cannula     FIO2 <21 %      Flow Rate 2.0 lpm      Ventilator Mode NA     Collected by Ninfa Francis     Comment: Meter: [id number]     :  [id number]       Extra Tubes [[id number]] Collected: 06/01/22 1518    Specimen: Blood, Venous Line Updated: 06/01/22 1633    Narrative:      The following orders were created for panel order Extra Tubes.  Procedure                               Abnormality         Status                     ---------                               -----------         ------                     Lavender Top[[id number]]                                     Final result               Gold Top - SST[[id number]]                                   Final result                 Please view results for these tests on the individual orders.    Lavender Top [[id number]] Collected: 06/01/22 1518    Specimen: Blood Updated: 06/01/22 1633     Extra Tube hold for add-on     Comment: Auto resulted       Gold Top - SST [[id number]] Collected: 06/01/22 1518    Specimen: Blood Updated: 06/01/22 1633     Extra Tube Hold for add-ons.     Comment: Auto resulted.       Troponin [[id number]]  (Abnormal) Collected: 06/01/22 1518    Specimen: Blood Updated: 06/01/22 1547     Troponin T 0.191 ng/mL     Narrative:      Troponin T Reference Range:  <= 0.03 ng/mL-   Negative for AMI  >0.03 ng/mL-     Abnormal for myocardial necrosis.  Clinicians would have to utilize clinical acumen, EKG, Troponin and serial changes to determine if it is an Acute Myocardial Infarction or myocardial injury due to an underlying chronic condition.       Results may be falsely decreased if patient taking Biotin.      Magnesium [[id number]]  (Abnormal) Collected: 06/01/22 1518    Specimen: Blood Updated: 06/01/22 1538     Magnesium 2.6 mg/dL     Respiratory Culture - Sputum, Cough [[id number]] Collected:  05/30/22 2052    Specimen: Sputum from Cough Updated: 05/30/22 2128     Respiratory Culture Rejected     Gram Stain Many (4+) Squamous epithelial cells      Rare (1+) WBCs per low power field      Mixed bacterial rosalinda    Narrative:      Specimen rejected due to oropharyngeal contamination. Please reorder and recollect specimen if clinically necessary.    Magnesium [[id number]]  (Normal) Collected: 05/30/22 0421    Specimen: Blood Updated: 05/30/22 1201     Magnesium 2.1 mg/dL     Lactic Acid, Plasma [[id number]]  (Normal) Collected: 05/30/22 0510    Specimen: Blood Updated: 05/30/22 0544     Lactate 1.2 mmol/L     [place] Draw [[id number]] Collected: 05/30/22 0421    Specimen: Blood Updated: 05/30/22 0534    Narrative:      The following orders were created for panel order [place] Draw.  Procedure                               Abnormality         Status                     ---------                               -----------         ------                     Green Top (Gel)[[id number]]                                  Final result               Lavender Top[[id number]]                                     Final result               Gold Top - SST[[id number]]                                   Final result               Light Blue Top[[id number]]                                   Final result                 Please view results for these tests on the individual orders.    Gold Top - SST [[id number]] Collected: 05/30/22 0421    Specimen: Blood Updated: 05/30/22 0534     Extra Tube Hold for add-ons.     Comment: Auto resulted.       Green Top (Gel) [[id number]] Collected: 05/30/22 0421    Specimen: Blood Updated: 05/30/22 0534     Extra Tube Hold for add-ons.     Comment: Auto resulted.       Light Blue Top [[id number]] Collected: 05/30/22 0421    Specimen: Blood Updated: 05/30/22 0534     Extra Tube Hold for add-ons.     Comment: Auto resulted       Lavender Top [[id number]] Collected: 05/30/22 0421    Specimen: Blood Updated:  05/30/22 0534     Extra Tube hold for add-on     Comment: Auto resulted       COVID-19 and FLU A/B PCR - Swab, Nasopharynx [[id number]]  (Normal) Collected: 05/30/22 0449    Specimen: Swab from Nasopharynx Updated: 05/30/22 0520     COVID19 Not Detected     Influenza A PCR Not Detected     Influenza B PCR Not Detected    Narrative:      Fact sheet for providers: https://www.fda.gov/media/249388/download    Fact sheet for patients: https://www.fda.gov/media/578950/download    Test performed by PCR.    Blood Gas, Arterial - [[id number]]  (Abnormal) Collected: 05/30/22 0516    Specimen: Arterial Blood Updated: 05/30/22 0518     Site Right Radial     Ruiz's Test N/A     pH, Arterial 7.349 pH units      Comment: 84 Value below reference range        pCO2, Arterial 46.0 mm Hg      Comment: 83 Value above reference range        pO2, Arterial 76.6 mm Hg      Comment: 84 Value below reference range        HCO3, Arterial 25.3 mmol/L      Base Excess, Arterial -0.5 mmol/L      Comment: 84 Value below reference range        O2 Saturation, Arterial 96.2 %      Barometric Pressure for Blood Gas 746 mmHg      Modality Nasal Cannula     FIO2 <21 %      Flow Rate 2.0 lpm      Ventilator Mode NA     Collected by Valerie Rosa     Comment: Meter: [id number]     :  [id number]       Troponin [[id number]]  (Abnormal) Collected: 05/30/22 0421    Specimen: Blood Updated: 05/30/22 0450     Troponin T 0.041 ng/mL     Narrative:      Troponin T Reference Range:  <= 0.03 ng/mL-   Negative for AMI  >0.03 ng/mL-     Abnormal for myocardial necrosis.  Clinicians would have to utilize clinical acumen, EKG, Troponin and serial changes to determine if it is an Acute Myocardial Infarction or myocardial injury due to an underlying chronic condition.       Results may be falsely decreased if patient taking Biotin.      Comprehensive Metabolic Panel [[id number]]  (Abnormal) Collected: 05/30/22 0421    Specimen: Blood Updated: 05/30/22 0448      Glucose 185 mg/dL      BUN 34 mg/dL      Creatinine 1.46 mg/dL      Sodium 136 mmol/L      Potassium 4.2 mmol/L      Chloride 99 mmol/L      CO2 25.0 mmol/L      Calcium 9.3 mg/dL      Total Protein 7.0 g/dL      Albumin 3.40 g/dL      ALT (SGPT) 28 U/L      AST (SGOT) 61 U/L      Alkaline Phosphatase 206 U/L      Total Bilirubin 0.4 mg/dL      Globulin 3.6 gm/dL      A/G Ratio 0.9 g/dL      BUN/Creatinine Ratio 23.3     Anion Gap 12.0 mmol/L      eGFR 36.0 mL/min/1.73      Comment: National Kidney Foundation and American Society of Nephrology (ASN) Task Force recommended calculation based on the Chronic Kidney Disease Epidemiology Collaboration (CKD-EPI) equation refit without adjustment for race.       Narrative:      GFR Normal >60  Chronic Kidney Disease <60  Kidney Failure <15      BNP [[id number]]  (Abnormal) Collected: 05/30/22 0421    Specimen: Blood Updated: 05/30/22 0445     proBNP 5,107.0 pg/mL     Narrative:      Among patients with dyspnea, NT-proBNP is highly sensitive for the detection of acute congestive heart failure. In addition NT-proBNP of <300 pg/ml effectively rules out acute congestive heart failure with 99% negative predictive value.    Results may be falsely decreased if patient taking Biotin.          Imaging Results (Most Recent)     Procedure Component Value Units Date/Time    CT Chest With Contrast Diagnostic [[id number]] Collected: 05/30/22 0554     Updated: 05/30/22 0702    Narrative:      EXAM DESCRIPTION:   CT CHEST W CONTRAST DIAGNOSTIC  [facility]: CT CHEST WITH IV CONTRAST     CLINICAL HISTORY:   Shortness of breath, tachycardic, r/o PE.    TECHNIQUE:   CT of the chest using intravenous Isovue-370, 56 mL.   All CT scans at this facility use dose modulation, iterative  reconstruction, and/or weight based dosing when appropriate to  reduce radiation dose to as low as reasonably achievable.    COMPARISON: CT chest 2/13/2017.    FINDINGS:  There are no filling defects identified to suggest  pulmonary  embolism. Coronary artery calcifications are present. No  mediastinal adenopathy is seen. There is scarring at the right  lung apex. There are diffuse centrilobular emphysematous changes.  There is mucous plugging the left lower lobe. There are multiple  scattered ground glass opacities throughout the mid to lower left  lower lobe suggesting infection. There is a small left pleural  effusion. There is minimal left lower lobe consolidation  inferiorly. There is minimal consolidation or scarring at the  superior left lower lobe. There is minimal scarring at the left  lung apex and right middle lobe. No acute fracture is identified.  The visualized upper abdominal structures demonstrate bilateral  renal cysts, larger on the left measuring 3.7 cm. There are a few  left renal stones measuring up to 5 mm.      Impression:      1.  No evidence for pulmonary embolism.  2.  Small left pleural effusion, with underlying  atelectasis/consolidation. Mucus plugging seen in the left lower  lobe, with scattered groundglass opacities suggesting infection.  3.  Bilateral scarring in the lungs, greater at the right apex.    Electronically signed by:  David Dove DO  5/30/2022 7:00 AM CDT  Workstation: [id number]    XR Chest 2 View [[id number]] Collected: 05/30/22 0435     Updated: 05/30/22 0611    Narrative:      EXAM DESCRIPTION:  XR CHEST 2 VW  [facility]: XR CHEST 2 VIEWS   Views:  2     CLINICAL HISTORY:  CHF/COPD Protocol.    COMPARISON:  Chest x-ray 3/15/2018.    FINDINGS:     Heart size is within normal limits. Pulmonary vascularity appears  unremarkable. There is a small left pleural effusion. There is  scarring at the right lung apex. There are atherosclerotic  calcifications along the aortic arch. There are emphysematous  changes.      Impression:        1.  Small left pleural effusion. Emphysematous changes. Scarring  at the right lung apex.    Electronically signed by:  David Dove DO  5/30/2022 6:09 AM CDT  Workstation:  "[id number]          Chief Complaint on Day of Discharge: No new complaints    Hospital Course:  The patient is a 82 y.o. female with a past medical history of COPD, chronic respiratory failure with hypoxia on 3 L of home oxygen, essential hypertension, esophageal cancer s/p chemoradiation, glaucoma and CKD who presented to [facility] with worsening shortness of breath.  Patient was admitted and started on therapy for pneumonia.  Patient also was noted to have elevated troponin cardiology was consulted.  Patient had echocardiogram showing EF 46 to 50%.  Medical management was recommended given her age and comorbidities.  Patient was started on metoprolol twice daily and tolerating this well.  Patient had gradual improvement in her breathing and oxygen requirements during her stay.  She was noted to be stable on 2 L of oxygen on the day of discharge and felt much improved.  She will complete her antibiotic therapy and continue with steroid taper at home.  Home health was offered to the patient but she declined.  Patient will follow up with PCP and cardiology as an outpatient.  Patient was understanding agreement.      Condition on Discharge: Stable    Physical Exam on Discharge:  /54 (BP Location: Left arm, Patient Position: Lying)   Pulse 88   Temp 98 °F (36.7 °C) (Oral)   Resp 18   Ht 147.3 cm (57.99\")   Wt 44.1 kg (97 lb 3.2 oz)   SpO2 99%   BMI 20.32 kg/m²   Physical Exam  Constitutional:       General: She is not in acute distress.     Appearance: She is not toxic-appearing.   HENT:      Head: Normocephalic and atraumatic.      Right Ear: External ear normal.      Left Ear: External ear normal.      Nose: Nose normal.      Mouth/Throat:      Mouth: Mucous membranes are moist.      Pharynx: Oropharynx is clear.   Eyes:      Conjunctiva/sclera: Conjunctivae normal.   Cardiovascular:      Rate and Rhythm: Normal rate and regular rhythm.      Pulses: Normal pulses.      Heart sounds: " Normal heart sounds.   Pulmonary:      Effort: Pulmonary effort is normal. No respiratory distress.      Breath sounds: Normal breath sounds.   Abdominal:      General: Bowel sounds are normal.      Palpations: Abdomen is soft.      Tenderness: There is no abdominal tenderness.   Musculoskeletal:         General: No swelling.      Cervical back: Neck supple.   Skin:     General: Skin is warm and dry.      Capillary Refill: Capillary refill takes less than 2 seconds.   Neurological:      General: No focal deficit present.      Mental Status: She is alert and oriented to person, place, and time. Mental status is at baseline.   Psychiatric:         Behavior: Behavior normal.         Thought Content: Thought content normal.           Discharge Disposition:  Home or Self Care    Discharge Medications:     Discharge Medications      New Medications      Instructions Start Date   cefuroxime 500 MG tablet  Commonly known as: CEFTIN   500 mg, Oral, Every 12 Hours Scheduled      doxycycline 100 MG capsule  Commonly known as: MONODOX   100 mg, Oral, Every 12 Hours Scheduled      predniSONE 10 MG tablet  Commonly known as: DELTASONE   50 mg x2 days, 40 mg x2 days, 30 mg x2 days, 20 mg x2 days, and then 10 mg x2 days         Continue These Medications      Instructions Start Date   albuterol sulfate  (90 Base) MCG/ACT inhaler  Commonly known as: PROVENTIL HFA;VENTOLIN HFA;PROAIR HFA   INHALE 2 PUFFS BY MOUTH EVERY 6 HOURS AS NEEDED FOR WHEEZING      benzonatate 100 MG capsule  Commonly known as: Tessalon Perles   100 mg, Oral, 3 Times Daily PRN      Cholecalciferol 250 MCG (04437 UT) capsule   1 capsule, Oral, Daily      clotrimazole 1 % cream  Commonly known as: LOTRIMIN   Topical, 2 Times Daily      Combivent Respimat  MCG/ACT inhaler  Generic drug: ipratropium-albuterol   INHALE 1 PUFF BY MOUTH 4 TIMES DAILY      ipratropium-albuterol 0.5-2.5 mg/3 ml nebulizer  Commonly known as: DUO-NEB   3 mL, Nebulization, 4  Times Daily - RT      furosemide 40 MG tablet  Commonly known as: LASIX   40 mg, Oral, Daily PRN      guaiFENesin-dextromethorphan 100-10 MG/5ML syrup  Commonly known as: ROBITUSSIN DM   10 mL, Oral, 4 Times Daily PRN      latanoprost 0.005 % ophthalmic solution  Commonly known as: XALATAN   INSTILL 1 DROP INTO EACH EYE AT BEDTIME      lisinopril 20 MG tablet  Commonly known as: PRINIVILZESTRIL   Take 1 tablet by mouth once daily      loratadine 10 MG tablet  Commonly known as: Claritin   10 mg, Oral, Daily      sertraline 25 MG tablet  Commonly known as: ZOLOFT   25 mg, Oral, Daily             Discharge Diet:   Diet Instructions     Diet: Regular, Cardiac, Soft Texture; Thin Liquids, No Restrictions; Chopped      Discharge Diet:  Regular  Cardiac  Soft Texture       Fluid Consistency: Thin Liquids, No Restrictions    Soft Options: Chopped          Activity at Discharge:   Activity Instructions     Gradually Increase Activity Until at Pre-Hospitalization Level            Discharge Care Plan/Instructions: Take medications as prescribed, follow-up with PCP, follow-up with cardiology, and return for worsening symptoms.    Follow-up Appointments:   No future appointments.    Test Results Pending at Discharge: None      Red Nava MD    Time: 32 minutes

## 2022-06-06 NOTE — PLAN OF CARE
Goal Outcome Evaluation:           Progress: improving  Outcome Evaluation: Pt rested well through the night. No c/o pain. No shortness of air. Vitals stable. Receiving po antibiotic and steriod treatment

## 2022-06-06 NOTE — MEDICAL STUDENT
[facility] Medicine Services  INPATIENT PROGRESS NOTE    Length of Stay: 6  Date of Admission: 5/30/2022  Primary Care Physician: Jean Pierre Scanlon MD    Subjective   Chief Complaint: no new complaints  HPI: Ms. Singh is very pleasant and ready to be discharged. She is still having a productive cough. She reports that she feels better and is ready to go home. She reports that she will have family help her at home.     Review of Systems   Respiratory: Positive for cough and shortness of breath. Negative for chest tightness.    Cardiovascular: Negative for chest pain, palpitations and leg swelling.   Gastrointestinal: Negative for abdominal pain and vomiting.        Objective    Temp:  [98 °F (36.7 °C)-98.6 °F (37 °C)] 98 °F (36.7 °C)  Heart Rate:  [74-96] 88  Resp:  [18-20] 18  BP: (112-132)/(54-67) 112/54    Physical Exam  Vitals and nursing note reviewed.   HENT:      Mouth/Throat:      Mouth: Mucous membranes are moist.      Pharynx: Oropharynx is clear.   Eyes:      Extraocular Movements: Extraocular movements intact.   Cardiovascular:      Rate and Rhythm: Normal rate.   Pulmonary:      Effort: Pulmonary effort is normal.   Abdominal:      General: Abdomen is flat.      Palpations: Abdomen is soft.   Musculoskeletal:      Right lower leg: No edema.      Left lower leg: No edema.   Skin:     General: Skin is warm and dry.      Findings: Bruising present.   Neurological:      Mental Status: She is alert.         Results Review:  I have reviewed the labs, radiology results, and diagnostic studies.    Laboratory Data:   Results from last 7 days   Lab Units 06/06/22  0551 06/05/22  0607 06/04/22  0609   SODIUM mmol/L 143 143 139   POTASSIUM mmol/L 4.8 5.3* 5.0   CHLORIDE mmol/L 107 107 105   CO2 mmol/L 25.0 25.0 23.0   BUN mg/dL 67* 75* 78*   CREATININE mg/dL 1.40* 1.60* 1.77*   GLUCOSE mg/dL 105* 118* 120*   CALCIUM mg/dL 9.3 9.2 9.5   ANION GAP mmol/L 11.0 11.0 11.0      Estimated Creatinine Clearance: 21.9 mL/min (A) (by C-G formula based on SCr of 1.4 mg/dL (H)).  Results from last 7 days   Lab Units 06/01/22  1518   MAGNESIUM mg/dL 2.6*         Results from last 7 days   Lab Units 06/06/22  0551 06/05/22  0606 06/04/22  0609 06/03/22  0551 06/02/22  0540   WBC 10*3/mm3 10.47 9.41 7.19 6.23 7.92   HEMOGLOBIN g/dL 10.5* 9.4* 9.3* 9.2* 10.0*   HEMATOCRIT % 32.6* 29.3* 29.4* 28.2* 30.0*   PLATELETS 10*3/mm3 320 343 326 282 285           Culture Data:   No results found for: BLOODCX  No results found for: URINECX  No results found for: RESPCX  No results found for: WOUNDCX  No results found for: STOOLCX  No components found for: BODYFLD    Radiology Data:   Imaging Results (Last 24 Hours)     ** No results found for the last 24 hours. **          I have reviewed the patient's current medications.     Assessment/Plan     Active Problems:    Pneumonia of left lower lobe due to infectious organism    Severe malnutrition (HCC)    Chronic systolic CHF (congestive heart failure) (HCC)    Patient is improved. Discharge to home today. Patient reports family will be around to help her. Continue oral antibiotics and prednisone. Continue home medications. Follow up with PCP and cardiology.       Tatiana Verdin, Medical Student

## 2022-06-06 NOTE — OUTREACH NOTE
Prep Survey    Flowsheet Row Responses   [facility] patient discharged from? [place]   Is LACE score < 7 ? No   Emergency Room discharge w/ pulse ox? No   Eligibility [facility]   Date of Admission 05/30/22   Date of Discharge 06/06/22   Discharge Disposition Home or Self Care   Discharge diagnosis Pneumonia of left lower lobe due to infectious organism Acute on chronic respiratory failure with hypoxia and hypercapnia    Does the patient have one of the following disease processes/diagnoses(primary or secondary)? COPD/Pneumonia   Does the patient have Home health ordered? No   Is there a DME ordered? No   Prep survey completed? Yes          RADHA MCMANUS - Registered Nurse

## 2022-06-07 NOTE — OUTREACH NOTE
Call Center TCM Note    Flowsheet Row Responses   [facility] patient discharged from? [place]   Does the patient have one of the following disease processes/diagnoses(primary or secondary)? COPD/Pneumonia   Was the primary reason for admission: Pneumonia   TCM attempt successful? No   Unsuccessful attempts Attempt 1  [Outdated PCP verbal release to attempt other contacts. ]          Tamara Johnson RN    6/7/2022, 13:56 CDT

## 2022-06-07 NOTE — OUTREACH NOTE
Call Center TCM Note    Flowsheet Row Responses   [facility] patient discharged from? [place]   Does the patient have one of the following disease processes/diagnoses(primary or secondary)? COPD/Pneumonia   Was the primary reason for admission: Pneumonia   TCM attempt successful? No   Unsuccessful attempts Attempt 2          Tamara Johnson RN    6/7/2022, 14:55 CDT

## 2022-06-08 NOTE — OUTREACH NOTE
Call Center TCM Note    Flowsheet Row Responses   [facility] patient discharged from? [place]   Does the patient have one of the following disease processes/diagnoses(primary or secondary)? COPD/Pneumonia   Was the primary reason for admission: Pneumonia   TCM attempt successful? Yes   Call start time 1152   Call Status Comments Patient [place] on the phone   Call end time 1156   Discharge diagnosis Pneumonia of left lower lobe due to infectious organism Acute on chronic respiratory failure with hypoxia and hypercapnia    Meds reviewed with patient/caregiver? Yes   Is the patient having any side effects they believe may be caused by any medication additions or changes? No   Does the patient have all medications ordered at discharge? Yes   Is the patient taking all medications as directed (includes completed medication regime)? Yes   Medication comments Taking ATBS and steroids as ordered--reviewed prn lasix order for edema   Comments regarding appointments See AVs   Does the patient have a primary care provider?  Yes   Does the patient have an appointment with their PCP or specialist within 7 days of discharge? Yes   Comments regarding PCP Hospital PCP FOLLOW UP APPOINTMENT IS 6/13/22@0900am   Has the patient kept scheduled appointments due by today? Yes   Has home health visited the patient within 72 hours of discharge? N/A   DME comments Uses O2 continously   Pulse Ox monitoring Intermittent   Pulse Ox device source Other   O2 Sat comments REports one of her friends checks it occasionally for her   Psychosocial issues? No   Did the patient receive a copy of their discharge instructions? Yes   Nursing interventions Reviewed instructions with patient   What is the patient's perception of their health status since discharge? Improving  [Reports she is feeling better, no increased SOA but still has a cough-using her nebs.  Denies edema to feet/legs and has been elevating them since home. ]   Nursing  Interventions Nurse provided patient education  [Encouraged to monitor for increased respiratory issues--she reports she has friends that assist her w/medical needs and help monitor her condition. ]   Is the patient/caregiver able to teach back the hierarchy of who to call/visit for symptoms/problems? PCP, Specialist, Home health nurse, Urgent Care, ED, 911 Yes   TCM call completed? Yes          Samia Carpio RN    6/8/2022, 12:01 EDT

## 2022-06-13 NOTE — PROGRESS NOTES
Subjective   Tamara Singh is a 82 y.o. female. ER follow up    History of Present Illness   Patient presents today for ER follow up. She is accompanied by her niece and daughter. She was discharged from hospital on 6/6/2022. She says she continues to experience wheezing, shortness of breath, and lower extremity edema. She is almost done with steroid taper given to her at the hospital.  Her niece says she noticed mild edema of her ankles and is concerned about her kidney function. She says pt is also not urinating very often. She says pt also experiencing some constipation, pt says she had good bowel movement yesterday. Has upcoming appointment with cardiology. Denies any chest pain, heart palpations.     The following portions of the patient's history were reviewed and updated as appropriate: allergies, current medications, past family history, past medical history, past social history, past surgical history, and problem list.    Review of Systems   Constitutional: Negative for chills, fatigue and fever.   HENT: Negative for congestion, ear pain, rhinorrhea and sore throat.    Eyes: Negative for blurred vision, double vision and visual disturbance.   Respiratory: Positive for shortness of breath and wheezing. Negative for cough and chest tightness.    Cardiovascular: Positive for leg swelling. Negative for chest pain and palpitations.   Gastrointestinal: Negative for abdominal pain, diarrhea, nausea and vomiting.   Endocrine: Negative for cold intolerance and heat intolerance.   Genitourinary: Negative for difficulty urinating, dysuria, frequency and hematuria.   Musculoskeletal: Negative for arthralgias, back pain, neck pain and neck stiffness.   Skin: Negative for dry skin, pallor, rash, skin lesions and wound.   Allergic/Immunologic: Negative for environmental allergies, food allergies and immunocompromised state.   Neurological: Negative for dizziness, syncope, weakness, light-headedness, headache and  confusion.   Hematological: Negative for adenopathy. Does not bruise/bleed easily.   Psychiatric/Behavioral: Negative for self-injury, sleep disturbance, suicidal ideas, depressed mood and stress. The patient is not nervous/anxious.        Vitals:    06/13/22 0911   BP: 149/69   Pulse: 90   Resp: 22   SpO2: 99%     Body mass index is 20.07 kg/m².    Objective   Physical Exam  Vitals and nursing note reviewed.   Constitutional:       General: She is not in acute distress.     Appearance: She is well-developed. She is not ill-appearing.   HENT:      Head: Normocephalic.      Right Ear: Hearing, tympanic membrane, ear canal and external ear normal.      Left Ear: Hearing, tympanic membrane, ear canal and external ear normal.      Nose: Nose normal.      Mouth/Throat:      Lips: Pink.      Mouth: Mucous membranes are moist.      Pharynx: Oropharynx is clear. Uvula midline. No oropharyngeal exudate.   Eyes:      General: Lids are normal. Gaze aligned appropriately.      Conjunctiva/sclera: Conjunctivae normal.      Pupils: Pupils are equal, round, and reactive to light.   Neck:      Thyroid: No thyroid mass, thyromegaly or thyroid tenderness.   Cardiovascular:      Rate and Rhythm: Normal rate and regular rhythm.      Heart sounds: Normal heart sounds, S1 normal and S2 normal. No murmur heard.  Pulmonary:      Effort: Pulmonary effort is normal.      Breath sounds: Normal air entry. Examination of the right-upper field reveals wheezing. Examination of the left-upper field reveals wheezing. Examination of the right-middle field reveals wheezing. Examination of the left-middle field reveals wheezing. Examination of the right-lower field reveals wheezing. Examination of the left-lower field reveals wheezing. Wheezing present. No decreased breath sounds, rhonchi or rales.   Abdominal:      General: Abdomen is flat. Bowel sounds are normal.      Palpations: Abdomen is soft.      Tenderness: There is no abdominal tenderness.    Musculoskeletal:         General: Normal range of motion.      Cervical back: Full passive range of motion without pain, normal range of motion and neck supple.      Right lower leg: Edema (trace ankle edema) present.      Left lower leg: Edema (trace ankle edema) present.   Lymphadenopathy:      Cervical: No cervical adenopathy.   Skin:     General: Skin is warm and dry.   Neurological:      General: No focal deficit present.      Mental Status: She is alert and oriented to person, place, and time.      Coordination: Coordination is intact.      Gait: Gait is intact.   Psychiatric:         Attention and Perception: Attention normal.         Mood and Affect: Mood normal.         Speech: Speech normal.         Behavior: Behavior normal. Behavior is cooperative.         Thought Content: Thought content normal.         Cognition and Memory: Cognition normal.         Judgment: Judgment normal.           Assessment & Plan   Diagnoses and all orders for this visit:    1. Follow-up exam (Primary)    2. Shortness of breath  -     Cancel: XR Chest PA & Lateral (In Office)  -     XR Chest PA & Lateral (In Office)    3. Decreased urination  -     Basic metabolic panel    4. Wheezing  -     Discontinue: predniSONE (DELTASONE) 20 MG tablet; Take 2 tablets by mouth Daily.  Dispense: 10 tablet; Refill: 0  -     XR Chest PA & Lateral (In Office)  -     predniSONE (DELTASONE) 20 MG tablet; Take 2 tablets twice daily for 7 days. Take 1 tablet twice daily for 7 days. Take 1 tablet daily for 7 days.  Dispense: 49 tablet; Refill: 0    5. Constipation, unspecified constipation type  -     docusate sodium (Colace) 100 MG capsule; Take 1 capsule by mouth 2 (Two) Times a Day.  Dispense: 60 capsule; Refill: 11    Physical exam revealed wheezing throughout lungs.  CXR ordered to rule out acute processes.  BMP ordered to check kidney function.  Starting new prednisone taper due to wheezing.  Take colace 100 mg BID to help prevent  constipation.  Discussed s/s that would warrant ER presentation (shortness of breath does not improve/worsens, chest pain, heart palpations)  If symptoms do not improve or worsen, patient was instructed to return to clinic for further evaluation.         This document has been electronically signed by LIBBY Palacois on  June 13, 2022 12:18 CDT

## 2022-06-15 NOTE — OUTREACH NOTE
COPD/PN Week 2 Survey    Flowsheet Row Responses   [facility] patient discharged from? [place]   Does the patient have one of the following disease processes/diagnoses(primary or secondary)? COPD/Pneumonia   Was the primary reason for admission: Pneumonia   Week 2 attempt successful? Yes   Call start time 1527   Call end time 1533   Meds reviewed with patient/caregiver? Yes   Is the patient having any side effects they believe may be caused by any medication additions or changes? No   Does the patient have all medications ordered at discharge? Yes   Is the patient taking all medications as directed (includes completed medication regime)? Yes   Does the patient have a primary care provider?  Yes   Does the patient have an appointment with their PCP or specialist within 7 days of discharge? Yes   Has the patient kept scheduled appointments due by today? Yes   Has home health visited the patient within 72 hours of discharge? N/A   Pulse Ox monitoring Intermittent   Pulse Ox device source Other   O2 Sat comments 92-94% on continuous home O2.   O2 Sat: education provided Sat levels   Psychosocial issues? No   Did the patient receive a copy of their discharge instructions? Yes   Nursing interventions Reviewed instructions with patient   What is the patient's perception of their health status since discharge? Improving   Nursing Interventions Nurse provided patient education   If the patient is a current smoker, are they able to teach back resources for cessation? Not a smoker   Is the patient/caregiver able to teach back the hierarchy of who to call/visit for symptoms/problems? PCP, Specialist, Home health nurse, Urgent Care, ED, 911 Yes   Is the patient/caregiver able to teach back signs and symptoms of worsening condition: Fever/chills, Shortness of breath, Chest pain   Is the patient/caregiver able to teach back importance of completing antibiotic course of treatment? Yes   Week 2 call completed? Yes   Wrap  up additional comments Patient states is feeling slightly better. States still has some edema of feet/legs but is improving. Denies any fever, chest pain or worsening SOA. Denies any needs today.          ANJANA MEJIA - Registered Nurse

## 2022-06-19 PROBLEM — R06.02 SOB (SHORTNESS OF BREATH): Status: ACTIVE | Noted: 2022-01-01

## 2022-06-19 NOTE — ED PROVIDER NOTES
Subjective   82-year-old female with history of COPD on nasal cannula oxygen, heart failure on Lasix comes to the ER with several day history of worsening shortness of breath and leg swelling.  Patient reports being recently in the hospital.  Friend thought that she might have a blood clot that is causing the swelling in her legs.  Patient has been taking her Lasix, but has not helped.  Shortness of breath is worse with ambulation and exertion.  Patient is unable to lay flat needs to sit up at an angle.      History provided by:  Patient and friend   used: No        Review of Systems   Constitutional: Positive for activity change and fatigue. Negative for appetite change, chills and fever.   HENT: Negative for drooling.    Eyes: Negative for redness.   Respiratory: Positive for shortness of breath. Negative for cough, chest tightness and wheezing.    Cardiovascular: Positive for leg swelling. Negative for chest pain.   Gastrointestinal: Negative for abdominal pain, nausea and vomiting.   Genitourinary: Negative for flank pain.   Skin: Negative for color change.   Neurological: Negative for seizures.   Psychiatric/Behavioral: Negative for confusion.       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)    • Nuclear cataract    • Perleche    • Pneumathemia (HCC)     UNSPECIFIED ORGANISM   • Shoulder joint pain    • Upper  respiratory infection    • Vitamin D deficiency    • Wheezing        Allergies   Allergen Reactions   • Ativan [Lorazepam] Palpitations, Delirium, Confusion and Unknown - High Severity       Past Surgical History:   Procedure Laterality Date   • CATARACT EXTRACTION  2015    Remove cataract, insert lens (Left eye.)   • CHOLECYSTECTOMY  2004   • COLONOSCOPY  2013    Colon endoscopy 01574 (Internal & external hemorrhoids found.)   • ENDOSCOPY  2014    EGD w/ biopsy 31697 (Normal esophagus. Biopsy taken. Normal stomach. Normal duodenum.)   2014 GURPREET BHAT    • ENDOSCOPY  2014    EGD w/ tube 19263 (Esophagitis seen. Biopsy taken. No evidence of esophageal ca. Gastritis in stomach. Biopsy taken. Normal duodenum.)   • ENDOSCOPY  10/02/2013    EGD w/ tube 72917 (Normal esopahgus, stoamch, & duodenum. No evidence of tumor. Biopsy taken.)   • ENDOSCOPY  2013    EGD w/ tube 60556 (Tumor in distal 3rd of esophagus. Biopsy taken. Lesion runs from 28 cms to 34 cms. Normal stomach & duodenum.)   • INJECTION OF MEDICATION  2016    Kenalog (5)      • OTHER SURGICAL HISTORY  12/10/2014    Drain/Inject Major Joint  (Shoulder joint pain)    • OTHER SURGICAL HISTORY      OCT DISC NFL 75422 (Primary open angle glaucoma (2): 2016, 2015   • OTHER SURGICAL HISTORY  2016    OPTIC NERVE HEAD EVAL PERFORMED  (Primary open angle glaucoma)        Family History   Problem Relation Age of Onset   • Diabetes Mother    • Cancer Father    • Cancer Brother    • Diabetes Brother        Social History     Socioeconomic History   • Marital status:    Tobacco Use   • Smoking status: Former Smoker     Packs/day: 1.00     Years: 59.00     Pack years: 59.00     Types: Cigarettes     Start date: 1955     Quit date: 2014     Years since quittin.4   • Smokeless tobacco: Never Used   Vaping Use   • Vaping Use: Never used   Substance and Sexual Activity   • Alcohol use: No  "  • Drug use: No   • Sexual activity: Defer           Objective    Vitals:    06/19/22 1237 06/19/22 1247 06/19/22 1435 06/19/22 1523   BP: 141/72  172/75 175/78   BP Location: Left arm      Patient Position: Sitting      Pulse:  86 98 80   Resp: 22 22    Temp: 97.4 °F (36.3 °C)      TempSrc: Oral      SpO2: (!) 89%  98% 97%   Weight: 43.1 kg (95 lb)      Height: 149.9 cm (59\")          Physical Exam  Vitals and nursing note reviewed.   Constitutional:       General: She is not in acute distress.     Appearance: She is well-developed. She is not ill-appearing, toxic-appearing or diaphoretic.   HENT:      Head: Normocephalic.      Right Ear: External ear normal.      Left Ear: External ear normal.      Nose: No congestion or rhinorrhea.   Eyes:      General: No scleral icterus.     Conjunctiva/sclera: Conjunctivae normal.   Pulmonary:      Effort: Pulmonary effort is normal. No accessory muscle usage or respiratory distress.      Breath sounds: No wheezing.   Chest:      Chest wall: No tenderness.   Abdominal:      General: Bowel sounds are normal.      Palpations: Abdomen is soft.      Tenderness: There is no abdominal tenderness (deep palpation).   Musculoskeletal:      Right lower leg: Edema present.      Left lower leg: Edema present.   Skin:     General: Skin is warm and dry.      Capillary Refill: Capillary refill takes less than 2 seconds.   Neurological:      Mental Status: She is alert and oriented to person, place, and time.   Psychiatric:         Behavior: Behavior normal.         ECG 12 Lead      Date/Time: 6/19/2022 12:46 PM  Performed by: Chang Ellis MD  Authorized by: Chang Ellis MD   Interpreted by physician  Rhythm: sinus rhythm  Ectopy: PVCs  Rate: normal  BPM: 84  QRS axis: normal  ST segment elevation noted on lead: none.  Clinical impression: abnormal ECG                 ED Course      Results for orders placed or performed during the hospital encounter of 06/19/22   Comprehensive " Metabolic Panel    Specimen: Blood   Result Value Ref Range    Glucose 126 (H) 65 - 99 mg/dL    BUN 31 (H) 8 - 23 mg/dL    Creatinine 0.93 0.57 - 1.00 mg/dL    Sodium 145 136 - 145 mmol/L    Potassium 4.2 3.5 - 5.2 mmol/L    Chloride 103 98 - 107 mmol/L    CO2 33.0 (H) 22.0 - 29.0 mmol/L    Calcium 8.7 8.6 - 10.5 mg/dL    Total Protein 6.0 6.0 - 8.5 g/dL    Albumin 3.40 (L) 3.50 - 5.20 g/dL    ALT (SGPT) 56 (H) 1 - 33 U/L    AST (SGOT) 44 (H) 1 - 32 U/L    Alkaline Phosphatase 149 (H) 39 - 117 U/L    Total Bilirubin 0.2 0.0 - 1.2 mg/dL    Globulin 2.6 gm/dL    A/G Ratio 1.3 g/dL    BUN/Creatinine Ratio 33.3 (H) 7.0 - 25.0    Anion Gap 9.0 5.0 - 15.0 mmol/L    eGFR 61.9 >60.0 mL/min/1.73   BNP    Specimen: Blood   Result Value Ref Range    proBNP 14,033.0 (H) 0.0 - 1,800.0 pg/mL   Troponin    Specimen: Blood   Result Value Ref Range    Troponin T 0.010 0.000 - 0.030 ng/mL   CBC Auto Differential    Specimen: Blood   Result Value Ref Range    WBC 6.63 3.40 - 10.80 10*3/mm3    RBC 3.53 (L) 3.77 - 5.28 10*6/mm3    Hemoglobin 10.7 (L) 12.0 - 15.9 g/dL    Hematocrit 36.5 34.0 - 46.6 %    .4 (H) 79.0 - 97.0 fL    MCH 30.3 26.6 - 33.0 pg    MCHC 29.3 (L) 31.5 - 35.7 g/dL    RDW 15.9 (H) 12.3 - 15.4 %    RDW-SD 61.0 (H) 37.0 - 54.0 fl    MPV 9.9 6.0 - 12.0 fL    Platelets 105 (L) 140 - 450 10*3/mm3    Neutrophil % 91.3 (H) 42.7 - 76.0 %    Lymphocyte % 5.3 (L) 19.6 - 45.3 %    Monocyte % 2.6 (L) 5.0 - 12.0 %    Eosinophil % 0.0 (L) 0.3 - 6.2 %    Basophil % 0.2 0.0 - 1.5 %    Immature Grans % 0.6 (H) 0.0 - 0.5 %    Neutrophils, Absolute 6.06 1.70 - 7.00 10*3/mm3    Lymphocytes, Absolute 0.35 (L) 0.70 - 3.10 10*3/mm3    Monocytes, Absolute 0.17 0.10 - 0.90 10*3/mm3    Eosinophils, Absolute 0.00 0.00 - 0.40 10*3/mm3    Basophils, Absolute 0.01 0.00 - 0.20 10*3/mm3    Immature Grans, Absolute 0.04 0.00 - 0.05 10*3/mm3    nRBC 0.0 0.0 - 0.2 /100 WBC   Blood Gas, Arterial -    Specimen: Arterial Blood   Result Value Ref  "Range    Site Right Brachial     Ruiz's Test N/A     pH, Arterial 7.318 (L) 7.350 - 7.450 pH units    pCO2, Arterial 73.7 (C) 35.0 - 45.0 mm Hg    pO2, Arterial 98.9 83.0 - 108.0 mm Hg    HCO3, Arterial 37.8 (H) 20.0 - 26.0 mmol/L    Base Excess, Arterial 9.5 (H) 0.0 - 2.0 mmol/L    O2 Saturation, Arterial 98.2 94.0 - 99.0 %    Barometric Pressure for Blood Gas 752 mmHg    Modality Nasal Cannula     FIO2 <21 %    Flow Rate 3.0 lpm    Ventilator Mode NA    Green Top (Gel)   Result Value Ref Range    Extra Tube Hold for add-ons.      US Venous Doppler Lower Extremity Bilateral (duplex)   Final Result   IMPRESSION   1.  No evidence of deep venous thrombosis of the bilateral lower   extremities.      Electronically signed by:  Miah De La Rosa MD  6/19/2022 3:14 PM CDT   Workstation: [id number]      XR Chest 1 View    (Results Pending)           MDM  Number of Diagnoses or Management Options  Acute on chronic respiratory failure with hypoxia and hypercapnia (HCC): new and requires workup  SOB (shortness of breath): new and requires workup  Diagnosis management comments: Vital signs are stable, afebrile.  Labs significant for BNP of 14,000, PCO2 on ABG is 74.  Ultrasound negative for DVT.  Patient received Lasix and a DuoNeb treatment.  Spoke with the on-call hospitalist agrees to admit for \"heart failure exacerbation.\"       Amount and/or Complexity of Data Reviewed  Decide to obtain previous medical records or to obtain history from someone other than the patient: yes        Final diagnoses:   SOB (shortness of breath)   Acute on chronic respiratory failure with hypoxia and hypercapnia (HCC)       ED Disposition  ED Disposition     ED Disposition   Decision to Admit    Condition   --    Comment   Level of Care: Telemetry [5]   Diagnosis: SOB (shortness of breath) [998699]   Admitting Physician: KEATON DURANT [[id number]]   Attending Physician: KEATON DURANT [[id number]]               No " follow-up provider specified.       Medication List      No changes were made to your prescriptions during this visit.          Chang Ellis MD  06/19/22 [id number]

## 2022-06-19 NOTE — ED NOTES
Patient's O2 saturation level was between 80%-83% during triage. The patient wears 2L O2 nasal cannula most of time per patient's family member. The patient was discharged from the hospital recently due to pneumonia. The patient has a noted wet sounding cough and she is currently tachypneic.

## 2022-06-19 NOTE — H&P
[facility] HOSPITALIST HISTORY AND PHYSICAL    Patient Identification:  Name:  Tamara Singh  Age:  82 y.o.  Sex:  female  :  1940  MRN:  [id number]   Visit Number:  [id number]  Primary Care Physician:  Jean Pierre Scanlon MD     Chief complaint: Worsening shortness of breath and lower extremity swelling.    History of presenting illness:  82 y.o. female with known history of chronic respiratory failure with hypoxia and hypercapnia on home oxygen, COPD as well as chronic systolic congestive heart failure presents to the emergency room due to worsening shortness of breath associated with lower extremity swelling.  Patient was admitted on 2022 and was treated for pneumonia and discharged on  2022.  After her discharge from the hospital she remained short of breath and was seen by her PCP on 2022.  During the PCP visit she complained of shortness of breath wheezing and ankle swelling.  She  complained of constipation.  She was prescribed prednisone .  She however has remained short of breath and noticed worsening swelling of her lower extremities especially in the calf area despite being on furosemide.  She denies any chest pain but complains of subcostal pain bilaterally and feels full in her abdomen.  She admits to orthopnea.Her appetite has been good and according to the family she has been sleeping better than previously. Despite being on furosemide her urine output according to the family has been less.  Ambulating to the restroom which is a  very short distance resorcin worsening shortness of breath. Chest x-ray shows bilateral pleural effusion she still has infiltrates both lower lobes but worse on the left.  This is not new..  Her pro BNP which was above 5000 and her last admission is now 14,000.    ---------------------------------------------------------------------------------------------------------------------   Review of Systems    Constitutional: Negative.    HENT: Negative.    Respiratory: Positive for cough, shortness of breath and wheezing.    Cardiovascular: Positive for leg swelling.   Gastrointestinal: Negative.    Genitourinary: Negative.    Musculoskeletal: Negative.    Skin: Negative.    Neurological: Negative.    Psychiatric/Behavioral: Negative.       ---------------------------------------------------------------------------------------------------------------------   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 Biopsy taken. Normal stomach. Normal duodenum.)   11/26/2014 GURPREET BHAT    • ENDOSCOPY  03/19/2014    EGD w/ tube 00809 (Esophagitis seen. Biopsy taken. No evidence of esophageal ca. Gastritis in stomach. Biopsy taken. Normal duodenum.)   • ENDOSCOPY  10/02/2013    EGD w/ tube 69060 (Normal esopahgus, stoamch, & duodenum. No evidence of tumor.  Biopsy taken.)   • ENDOSCOPY  2013    EGD w/ tube 69831 (Tumor in distal 3rd of esophagus. Biopsy taken. Lesion runs from 28 cms to 34 cms. Normal stomach & duodenum.)   • INJECTION OF MEDICATION  2016    Kenalog (5)      • OTHER SURGICAL HISTORY  12/10/2014    Drain/Inject Major Joint  (Shoulder joint pain)    • OTHER SURGICAL HISTORY      OCT DISC NFL 88718 (Primary open angle glaucoma (2): 2016, 2015   • OTHER SURGICAL HISTORY  2016    OPTIC NERVE HEAD EVAL PERFORMED  (Primary open angle glaucoma)      Family History   Problem Relation Age of Onset   • Diabetes Mother    • Cancer Father    • Cancer Brother    • Diabetes Brother      Social History     Socioeconomic History   • Marital status:    Tobacco Use   • Smoking status: Former Smoker     Packs/day: 1.00     Years: 59.00     Pack years: 59.00     Types: Cigarettes     Start date: 1955     Quit date: 2014     Years since quittin.4   • Smokeless tobacco: Never Used   Vaping Use   • Vaping Use: Never used   Substance and Sexual Activity   • Alcohol use: No   • Drug use: No   • Sexual activity: Defer     ---------------------------------------------------------------------------------------------------------------------   Allergies:  Ativan [lorazepam]  ---------------------------------------------------------------------------------------------------------------------   Prior to Admission Medications     Prescriptions Last Dose Informant Patient Reported? Taking?    albuterol sulfate  (90 Base) MCG/ACT inhaler   No No    INHALE 2 PUFFS BY MOUTH EVERY 6 HOURS AS NEEDED FOR WHEEZING    benzonatate (Tessalon Perles) 100 MG capsule   No No    Take 1 capsule by mouth 3 (Three) Times a Day As Needed for Cough.    Cholecalciferol 90771 UNITS capsule   Yes No    Take 1 capsule by mouth daily.    clotrimazole (LOTRIMIN) 1 % cream   No No    Apply  topically to the appropriate area as directed 2 (Two)  Times a Day.    Combivent Respimat  MCG/ACT inhaler   No No    INHALE 1 PUFF BY MOUTH 4 TIMES DAILY    docusate sodium (Colace) 100 MG capsule   No No    Take 1 capsule by mouth 2 (Two) Times a Day.    furosemide (LASIX) 40 MG tablet   No No    Take 1 tablet by mouth Daily As Needed (SWELLING).    guaiFENesin-dextromethorphan (ROBITUSSIN DM) 100-10 MG/5ML syrup   No No    Take 10 mL by mouth 4 (Four) Times a Day As Needed for Cough.    ipratropium-albuterol (DUO-NEB) 0.5-2.5 mg/3 ml nebulizer   No No    Inhale the contents of 1 vial by nebulization 4 (Four) Times a Day for 30 days.    latanoprost (XALATAN) 0.005 % ophthalmic solution   Yes No    INSTILL 1 DROP INTO EACH EYE AT BEDTIME    lisinopril (PRINIVIL,ZESTRIL) 20 MG tablet   No No    Take 1 tablet by mouth once daily    loratadine (Claritin) 10 MG tablet   No No    Take 1 tablet by mouth Daily.    metoprolol tartrate (LOPRESSOR) 25 MG tablet   No No    Take one-half tablet (12.5 mg) by mouth Every 12 (Twelve) Hours.    predniSONE (DELTASONE) 20 MG tablet   No No    Take 2 tablets twice daily for 7 days. Take 1 tablet twice daily for 7 days. Take 1 tablet daily for 7 days.    sertraline (ZOLOFT) 25 MG tablet   No No    Take 1 tablet by mouth Daily.        ---------------------------------------------------------------------------------------------------------------------   Vital Signs:  Temp:  [97.4 °F (36.3 °C)] 97.4 °F (36.3 °C)  Heart Rate:  [80-98] 88  Resp:  [22] 22  BP: (141-175)/(68-78) 156/68      06/19/22  1237   Weight: 43.1 kg (95 lb)     Body mass index is 19.19 kg/m².  ---------------------------------------------------------------------------------------------------------------------   Physical Exam:  Constitutional: Chronic ill looking elderly woman but not in acute distress.  Mild respiratory distress.      HENT:  Head: Normocephalic and atraumatic.  Mouth:  Moist mucous membranes.    Eyes:  Conjunctivae and EOM are normal.  Pupils are  equal, round, and reactive to light.  No scleral icterus.  Neck:  Neck supple.  No JVD present.    Cardiovascular:  Normal rate, regular rhythm and normal heart sounds with no murmur.  Pulmonary/Chest: Emphysematous slight kyphosis.  Mild respiratory distress, few expiratory wheezes, bibasilar crackles, with decreased breath sounds and diminished air movement.  Abdominal:  Soft.  Bowel sounds are normal.  No distension and no tenderness.   Musculoskeletal: 2+ bilateral lower extremity edema, calf tenderness, and no deformity.  No red or swollen joints anywhere.    Neurological:  Alert and oriented to person, place, and time.  No cranial nerve deficit.  No tongue deviation.  No facial droop.  No slurred speech.   Skin:  Skin is warm and dry.  No rash noted.  No pallor.  Ecchymosis on the left lower extremity above the medial malleoli  Psychiatric:  Normal mood and affect.  Behavior is normal.  Judgment and thought content normal.   Peripheral vascular:  No edema and strong pulses on all 4 extremities.  Genitourinary: Deferred  ---------------------------------------------------------------------------------------------------------------------  EKG:     ---------------------------------------------------------------------------------------------------------------------   Results from last 7 days   Lab Units 06/19/22  1531 06/19/22  1337   LACTATE mmol/L 1.5  --    WBC 10*3/mm3  --  6.63   HEMOGLOBIN g/dL  --  10.7*   HEMATOCRIT %  --  36.5   MCV fL  --  103.4*   MCHC g/dL  --  29.3*   PLATELETS 10*3/mm3  --  105*     Results from last 7 days   Lab Units 06/19/22  1450   PH, ARTERIAL pH units 7.318*   PO2 ART mm Hg 98.9   PCO2, ARTERIAL mm Hg 73.7*   HCO3 ART mmol/L 37.8*     Results from last 7 days   Lab Units 06/19/22  1337 06/13/22  0945   SODIUM mmol/L 145 145   POTASSIUM mmol/L 4.2 4.8   CHLORIDE mmol/L 103 107   CO2 mmol/L 33.0* 26.9   BUN mg/dL 31* 21   CREATININE mg/dL 0.93 0.98   CALCIUM mg/dL 8.7 8.7    GLUCOSE mg/dL 126* 88   ALBUMIN g/dL 3.40*  --    BILIRUBIN mg/dL 0.2  --    ALK PHOS U/L 149*  --    AST (SGOT) U/L 44*  --    ALT (SGPT) U/L 56*  --    Estimated Creatinine Clearance: 32.3 mL/min (by C-G formula based on SCr of 0.93 mg/dL).  No results found for: AMMONIA  Results from last 7 days   Lab Units 06/19/22  1337   TROPONIN T ng/mL 0.010     Results from last 7 days   Lab Units 06/19/22  1337   PROBNP pg/mL 14,033.0*     No results found for: HGBA1C  Lab Results   Component Value Date    TSH 3.790 06/25/2019     No results found for: PREGTESTUR, PREGSERUM, HCG, HCGQUANT  Pain Management Panel    There is no flowsheet data to display.       No results found for: BLOODCX  No results found for: URINECX  No results found for: WOUNDCX  No results found for: STOOLCX        I have personally looked at the labs and they are stated above.  ---------------------------------------------------------------------------------------------------------------------  Imaging Results (Last 7 Days)     Procedure Component Value Units Date/Time    XR Chest 1 View [[id number]] Collected: 06/19/22 1338     Updated: 06/19/22 1551    Narrative:      EXAM: XR CHEST 1 VIEW    HISTORY: SOA Triage Protocol    COMPARISON: 6/13/2022    TECHNIQUE:   Portable view of the chest.    FINDINGS:   Stable cardiomegaly. Mild to moderate left and small right  pleural effusion, increased since prior, with coexisting  atelectasis versus infiltrate in the lung bases more on the left.  Bilateral apical pleural thickening with chronic changes.  The mediastinal contours are within normal limits. .  No evidence of mediastinal shift or pneumothorax.  Unremarkable visualized osseous structures.      Impression:      Mild to moderate left and small right pleural effusion, increased  since prior, with coexisting atelectasis versus infiltrate in the  lung bases more on the left.  Bilateral apical pleural thickening with chronic  changes.            Electronically signed by:  Miah De La Rosa MD  6/19/2022 3:53 PM CDT  Workstation: [id number]    US Venous Doppler Lower Extremity Bilateral (duplex) [[id number]] Collected: 06/19/22 1345     Updated: 06/19/22 1516    Narrative:      EXAM: US LOWER EXTREMITY VEINS BILATERAL    HISTORY: leg swelling    COMPARISON STUDY:      TECHNIQUE:  Grayscale, duplex and color Doppler sonogram of the bilateral  lower extremities was obtained.     FINDINGS:    There is complete coaptation with graded compression at all  visualized levels from the common femoral vein to the calf veins.  There is augmentation of the venous waveform with calf  compression and respiratory variation seen at appropriate levels.   No filling defect is seen.   No Baker's cyst seen.     Unremarkable bilateral greater saphenous veins.      Impression:      IMPRESSION  1.  No evidence of deep venous thrombosis of the bilateral lower  extremities.    Electronically signed by:  Miah De La Rosa MD  6/19/2022 3:14 PM CDT  Workstation: [id number]        Results for orders placed during the hospital encounter of 05/30/22    Adult Transthoracic Echo Complete W/ Cont if Necessary Per Protocol    Interpretation Summary  · Left ventricular ejection fraction appears to be 46 - 50%.  · Left ventricular diastolic function was normal.  · Moderate tricuspid valve regurgitation is present.  · Estimated right ventricular systolic pressure from tricuspid regurgitation is moderately elevated (45-55 mmHg).  · Moderate pulmonary hypertension is present.  · The following left ventricular wall segments are hypokinetic: mid inferoseptal.        I have personally reviewed the radiology images and read the final radiology report.  ---------------------------------------------------------------------------------------------------------------------  Assessment and Plan:  Acute on chronic systolic congestive heart failure.  Place in observation status.    Acute heart failure  MPP initiated.  Diet: Cardiac diet  She has received IV Lasix 80 mg x 1 in the ED.  Will continue with IV Lasix 40 mg twice a day.  Will monitor renal function and potassium level closely  Will replace potassium preemptively  Strict I's and O's  Daily weights  Resume her lisinopril and metoprolol.    Chronic respiratory failure with hypoxia and hypercapnia.  Continue on DuoNeb  Titrate oxygen to keep saturation between 88 to 92%.    COPD with bronchitis.  Mucinex p.o. 1200 mg twice a day.  Prednisone 40 mg p.o. daily  DuoNeb    Hypertension.  Continue lisinopril and metoprolol.  Adjust dosage for better blood pressure control.    Chronic severe malnutrition-due to chronic illness.  Encourage p.o. intake  Encourage oral supplements.    Thrombocytopenia.  This is new.  We will monitor closely.  Will avoid anticoagulation.    Macrocytosis.  Review of her record showed that her folic acid is normal.  We will check B12 level.    DVT- due to thrombocytopenia which is new we will apply SCDs.    Disposition:  Observation status.  From previous documentation she is DNR/DNI.  Plan of care was discussed with the patient and the family member at the bedside.  Anticipate patient will be in hospital 1-2 midnights.    Timothy Shields MD  06/19/22  16:04 CDT     Dragon disclaimer:  Part of this note may be an electronic transcription/translation of spoken language to printed text using the Dragon Dictation System.

## 2022-06-19 NOTE — ED NOTES
Patient is a difficult IV stick. This RN attempted 2x without success. DC, RN stick 1x and was successful, but IV would not draw blood. EP, ED Tech at bedside attempting a straight stick at this time. Patient tolerating well.

## 2022-06-19 NOTE — ED NOTES
Patient is at an increased risk for falls. Fall light activated, yellow falls bracelet and yellow non-skid socks placed on patient. Call light within reach and patient instructed to call for assistance. Side rails up x2, bed alarm activated, and gait belt readily accessible.

## 2022-06-19 NOTE — ED NOTES
Patient urinated in patient bed after coughing. This RN changed the patient's linens, removed soiled items and placed patient back on cardiac monitor. Patient's call light in reach.

## 2022-06-20 NOTE — CONSULTS
Adult Nutrition  Assessment    Patient Name:  Tamara Singh  YOB: 1940  MRN: [id number]  Admit Date:  6/19/2022    Assessment Date:  6/20/2022    Comments:  RD consult r/t MST 2. Pt is 83y/o admit r/t CHF, hx includes COPD. Pt recently discharged following treatment r/t pneumonia. Family reports current weight of 88# is normal for pt. Family does not want supplements while pt is eating well at meals. RD added chopped meats to diet order per request. Pt meets criteria for severe chronic malnutrition based on fat and muscle wasting as noted on MSA. RDN staff will monitor course and make recs prn.      Reason for Assessment     Row Name 06/20/22 1000          Reason for Assessment    Reason For Assessment identified at risk by screening criteria     Diagnosis cardiac disease;pulmonary disease;nutrition related history     Identified At Risk by Screening Criteria MST SCORE 2+;BMI                Nutrition/Diet History     Row Name 06/20/22 1000          Nutrition/Diet History    Typical Intake (Food/Fluid/EN/PN) Spoke w/family member in room who says pt ate very well at breakfast this am. Current weight is normal for her. She does not want to add supplements at this time since pt is eating well.                Anthropometrics     Row Name 06/20/22 1002 06/20/22 0556       Anthropometrics    Weight -- 40.1 kg (88 lb 6.4 oz)    Weight for Calculation 45.4 kg (100 lb) --               Labs/Tests/Procedures/Meds     Row Name 06/20/22 1001          Labs/Procedures/Meds    Lab Results Reviewed reviewed, pertinent     Lab Results Comments Na 154, BUN 34, crea 1.07, ALT 56, ALb 3.4            Medications    Pertinent Medications Reviewed reviewed     Pertinent Medications Comments steroids, lasix                Physical Findings     Row Name 06/20/22 1002          Physical Findings    Overall Physical Appearance fat/muscle wasting                Estimated/Assessed Needs - Anthropometrics     Row Name  06/20/22 1002 06/20/22 0556       Anthropometrics    Weight -- 40.1 kg (88 lb 6.4 oz)    Weight for Calculation 45.4 kg (100 lb) --       Estimated/Assessed Needs    Additional Documentation KCAL/KG (Group);Fluid Requirements (Group);Protein Requirements (Group) --       KCAL/KG    KCAL/KG 25 Kcal/Kg (kcal) --    25 Kcal/Kg (kcal) 1134 --       Protein Requirements    Weight Used For Protein Calculations 45.4 kg (100 lb) --    Est Protein Requirement Amount (gms/kg) 0.8 gm protein --    Estimated Protein Requirements (gms/day) 36.29 --       Fluid Requirements    Fluid Requirements (mL/day) 1200 --    RDA Method (mL) 1200 --               Nutrition Prescription Ordered     Row Name 06/20/22 1003          Nutrition Prescription PO    Current PO Diet Regular     Fluid Consistency Thin     Common Modifiers Cardiac;Fluid Restriction     Fluid Restriction mL per Day 1500 mL                Evaluation of Received Nutrient/Fluid Intake     Row Name 06/20/22 1003          PO Evaluation    Number of Meals 2     % PO Intake 50 x 1, 75 x 1                  Malnutrition Severity Assessment     Row Name 06/20/22 1012          Malnutrition Severity Assessment    Malnutrition Type Chronic Disease - Related Malnutrition            Muscle Loss    Loss of Muscle Mass Findings Severe     [place] Severe - deep hollowing/scooping, lack of muscle to touch, facial bones well defined     Clavicle Bone Region Severe - protruding prominent bone     Acromion Bone Region Severe - squared shoulders, bones, and acromion process protrusion prominent            Fat Loss    Subcutaneous Fat Loss Findings Severe     Orbital Region  Severe - pronounced hollowness/depression, dark circles, loose saggy skin     Upper Arm Region Severe - mostly skin, very little space between folds, fingers touch            Criteria Met (Must meet criteria for severity in at least 2 of these categories: M Wasting, Fat Loss, Fluid, Secondary Signs, Wt. Status,  Intake)    Patient meets criteria for  Severe Malnutrition                 Electronically signed by:  Coral Clark RD  06/20/22 10:13 CDT

## 2022-06-20 NOTE — PROGRESS NOTES
[facility] Medicine Services  INPATIENT PROGRESS NOTE    Length of Stay: 0  Date of Admission: 6/19/2022  Primary Care Physician: Jean Pierre Scanlon MD    Subjective   Chief Complaint: Shortness of air.    HPI: Patient is seen for follow-up.  She has slightly improved, remains short of air, deconditioned and on her baseline supplemental oxygen of 3 L nasal prong with saturation in the low 90s.    Review of Systems   Constitutional: Positive for activity change and fatigue. Negative for appetite change, chills, diaphoresis and fever.   HENT: Negative for trouble swallowing and voice change.    Eyes: Negative for photophobia and visual disturbance.   Respiratory: Positive for shortness of breath. Negative for cough, choking, chest tightness, wheezing and stridor.    Cardiovascular: Negative for chest pain, palpitations and leg swelling.   Gastrointestinal: Negative for abdominal distention, abdominal pain, blood in stool, constipation, diarrhea, nausea and vomiting.   Endocrine: Negative for cold intolerance, heat intolerance, polydipsia, polyphagia and polyuria.   Genitourinary: Negative for decreased urine volume, difficulty urinating, dysuria, enuresis, flank pain, frequency, hematuria and urgency.   Musculoskeletal: Negative for arthralgias, gait problem, myalgias, neck pain and neck stiffness.   Skin: Negative for pallor, rash and wound.   Neurological: Negative for dizziness, tremors, seizures, syncope, facial asymmetry, speech difficulty, weakness, light-headedness, numbness and headaches.   Hematological: Does not bruise/bleed easily.   Psychiatric/Behavioral: Negative for agitation, behavioral problems and confusion.       Objective    Temp:  [96.4 °F (35.8 °C)-98.7 °F (37.1 °C)] 98.7 °F (37.1 °C)  Heart Rate:  [] 93  Resp:  [18-22] 18  BP: (124-175)/(58-78) 148/66    Physical Exam  Vitals and nursing note reviewed.   Constitutional:       General: She is not in  acute distress.     Appearance: She is well-developed. She is cachectic. She is ill-appearing. She is not diaphoretic.      Comments: Patient is cachectic and severely malnourished.   HENT:      Head: Normocephalic and atraumatic.      Right Ear: External ear normal.      Left Ear: External ear normal.      Nose: Nose normal.   Eyes:      Extraocular Movements: Extraocular movements intact.      Conjunctiva/sclera: Conjunctivae normal.      Pupils: Pupils are equal, round, and reactive to light.   Neck:      Thyroid: No thyromegaly.      Vascular: No JVD.   Cardiovascular:      Rate and Rhythm: Normal rate and regular rhythm.      Heart sounds: Normal heart sounds. No murmur heard.    No friction rub. No gallop.   Pulmonary:      Effort: Pulmonary effort is normal. No respiratory distress.      Breath sounds: No stridor. Decreased breath sounds and rhonchi present. No wheezing or rales.   Chest:      Chest wall: No tenderness.   Abdominal:      General: Bowel sounds are normal. There is no distension.      Palpations: Abdomen is soft. There is no mass.      Tenderness: There is no abdominal tenderness. There is no right CVA tenderness, left CVA tenderness, guarding or rebound.      Hernia: No hernia is present.   Musculoskeletal:         General: No swelling, tenderness or deformity. Normal range of motion.      Cervical back: Normal range of motion and neck supple.      Right lower leg: No edema.      Left lower leg: No edema.   Skin:     General: Skin is warm and dry.      Coloration: Skin is not jaundiced or pale.      Findings: No bruising, erythema, lesion or rash.   Neurological:      General: No focal deficit present.      Mental Status: She is alert and oriented to person, place, and time.      Cranial Nerves: No cranial nerve deficit.      Sensory: No sensory deficit.      Motor: No weakness.      Coordination: Coordination normal.      Gait: Gait normal.      Deep Tendon Reflexes: Reflexes are normal and  symmetric. Reflexes normal.   Psychiatric:         Mood and Affect: Mood normal.         Behavior: Behavior normal. Behavior is cooperative.         Thought Content: Thought content normal.         Judgment: Judgment normal.           Medication Review:    Current Facility-Administered Medications:   •  docusate sodium (COLACE) capsule 100 mg, 100 mg, Oral, BID, Timothy Shields MD, 100 mg at 06/20/22 0821  •  furosemide (LASIX) injection 40 mg, 40 mg, Intravenous, Q12H, Timothy Shields MD, 40 mg at 06/20/22 0512  •  guaiFENesin (MUCINEX) 12 hr tablet 1,200 mg, 1,200 mg, Oral, Q12H, Timothy Shields MD, 1,200 mg at 06/20/22 0821  •  ipratropium-albuterol (DUO-NEB) nebulizer solution 3 mL, 3 mL, Nebulization, Q4H PRN, Behroozi, Saeid, MD, 3 mL at 06/20/22 0710  •  latanoprost (XALATAN) 0.005 % ophthalmic solution 1 drop, 1 drop, Both Eyes, Nightly, Timothy Shields MD, 1 drop at 06/19/22 2043  •  lisinopril (PRINIVIL,ZESTRIL) tablet 20 mg, 20 mg, Oral, Daily, Timothy Shields MD, 20 mg at 06/20/22 0820  •  metoprolol tartrate (LOPRESSOR) half tablet 12.5 mg, 12.5 mg, Oral, Q12H, Timothy Shields MD, 12.5 mg at 06/20/22 0821  •  potassium chloride (KLOR-CON) packet 40 mEq, 40 mEq, Oral, Daily, Timothy Shields MD, 40 mEq at 06/20/22 0820  •  predniSONE (DELTASONE) tablet 40 mg, 40 mg, Oral, Daily With Breakfast, Timothy Shields MD, 40 mg at 06/20/22 0820  •  sertraline (ZOLOFT) tablet 25 mg, 25 mg, Oral, Daily, Timothy Shields MD, 25 mg at 06/20/22 0821  •  sodium chloride 0.9 % flush 10 mL, 10 mL, Intravenous, PRN, Timothy Shields MD  •  sodium chloride 0.9 % flush 10 mL, 10 mL, Intravenous, Q12H, Timothy Shields MD, 10 mL at 06/20/22 0822  •  sodium chloride 0.9 % flush 10 mL, 10 mL, Intravenous, PRN, Timothy Shields,  MD    Results Review:  I have reviewed the labs, radiology results, and diagnostic studies.    Laboratory Data:   Results from last 7 days   Lab Units 06/20/22  0533 06/19/22  1337   SODIUM mmol/L 154* 145   POTASSIUM mmol/L 3.8 4.2   CHLORIDE mmol/L 103 103   CO2 mmol/L 34.0* 33.0*   BUN mg/dL 34* 31*   CREATININE mg/dL 1.07* 0.93   GLUCOSE mg/dL 80 126*   CALCIUM mg/dL 8.4* 8.7   BILIRUBIN mg/dL  --  0.2   ALK PHOS U/L  --  149*   ALT (SGPT) U/L  --  56*   AST (SGOT) U/L  --  44*   ANION GAP mmol/L 17.0* 9.0     Estimated Creatinine Clearance: 26.1 mL/min (A) (by C-G formula based on SCr of 1.07 mg/dL (H)).          Results from last 7 days   Lab Units 06/20/22  0702 06/19/22  1337   WBC 10*3/mm3 6.92 6.63   HEMOGLOBIN g/dL 10.5* 10.7*   HEMATOCRIT % 33.4* 36.5   PLATELETS 10*3/mm3 135* 105*           Culture Data:   No results found for: BLOODCX  No results found for: URINECX  No results found for: RESPCX  No results found for: WOUNDCX  No results found for: STOOLCX  No components found for: BODYFLD    Radiology Data:   Imaging Results (Last 24 Hours)     Procedure Component Value Units Date/Time    XR Chest 1 View [[id number]] Collected: 06/19/22 1338     Updated: 06/19/22 1555    Narrative:      EXAM: XR CHEST 1 VIEW    HISTORY: SOA Triage Protocol    COMPARISON: 6/13/2022    TECHNIQUE:   Portable view of the chest.    FINDINGS:   Stable cardiomegaly. Mild to moderate left and small right  pleural effusion, increased since prior, with coexisting  atelectasis versus infiltrate in the lung bases more on the left.  Bilateral apical pleural thickening with chronic changes.  The mediastinal contours are within normal limits. .  No evidence of mediastinal shift or pneumothorax.  Unremarkable visualized osseous structures.      Impression:      Mild to moderate left and small right pleural effusion, increased  since prior, with coexisting atelectasis versus infiltrate in the  lung bases more on the left.  Bilateral  apical pleural thickening with chronic changes.            Electronically signed by:  Miah De La Rosa MD  6/19/2022 3:53 PM CDT  Workstation: [id number]    US Venous Doppler Lower Extremity Bilateral (duplex) [[id number]] Collected: 06/19/22 1345     Updated: 06/19/22 1516    Narrative:      EXAM: US LOWER EXTREMITY VEINS BILATERAL    HISTORY: leg swelling    COMPARISON STUDY:      TECHNIQUE:  Grayscale, duplex and color Doppler sonogram of the bilateral  lower extremities was obtained.     FINDINGS:    There is complete coaptation with graded compression at all  visualized levels from the common femoral vein to the calf veins.  There is augmentation of the venous waveform with calf  compression and respiratory variation seen at appropriate levels.   No filling defect is seen.   No Baker's cyst seen.     Unremarkable bilateral greater saphenous veins.      Impression:      IMPRESSION  1.  No evidence of deep venous thrombosis of the bilateral lower  extremities.    Electronically signed by:  Miah De La Rosa MD  6/19/2022 3:14 PM CDT  Workstation: [id number]          I have reviewed the patient's current medications.     Assessment/Plan     Hospital Problem List:  Active Problems:    SOB (shortness of breath)    Acute on chronic respiratory failure with hypoxia and hypercapnia secondary to acute on chronic systolic heart failure/COPD exacerbation: Slowly improving.  Continue noninvasive respiratory therapy, bronchodilators, steroids with strict I's and O's, daily weights, salt and fluid restriction.  Discontinue Lasix secondary to worsening creatinine.  Repeat proBNP in 2 days.  Echocardiogram done 6/2/2022 showed:  · Left ventricular ejection fraction appears to be 46 - 50%.  · Left ventricular diastolic function was normal.  · Moderate tricuspid valve regurgitation is present.  · Estimated right ventricular systolic pressure from tricuspid regurgitation is moderately elevated (45-55 mmHg).  · Moderate pulmonary hypertension  is present.  · The following left ventricular wall segments are hypokinetic: mid inferoseptal    Hypernatremia (likely arising from overdiuresis): Begin gentle D5W and discontinue Lasix for now.  Mild diuretic induced azotemia will be monitored.    Severe malnutrition: Dietitian is following.    Hypertension: Stable.  Continue lisinopril and metoprolol.    Deconditioning: Consult PT and OT.    Continue GI and DVT prophylaxis.    Update was given to patient's daughter who was at the bedside.    CODE STATUS is 'no intubation and no CPR'.    Discharge Planning: In progress.    I confirmed that the patient's Advance Care Plan is present, code status is documented, or surrogate decision maker is listed in the patient's medical record.     I have utilized all available immediate resources to obtain, update, or review the patient's current medications      Lino Cartwright MD   06/20/22   11:06 CDT

## 2022-06-20 NOTE — DISCHARGE PLACEMENT REQUEST
"Khloe Singh (82 y.o. Female)             Date of Birth   1940    Social Security Number       Address   [address] [place], [place] [address]    Home Phone   [phone]    MRN   [id number]       [facility]    Marital Status                               Admission Date   6/19/22    Admission Type   Emergency    Admitting Provider   Timothy Shields MD    Attending Provider   Timothy Shields MD    Department, Room/Bed   [address], 371/1       Discharge Date       Discharge Disposition       Discharge Destination                               Attending Provider: Timothy Shields MD    Allergies: Ativan [Lorazepam]    Isolation: None   Infection: None   Code Status: No CPR   Advance Care Planning Activity    Ht: 149.9 cm (59\")   Wt: 40.1 kg (88 lb 6.4 oz)    Admission Cmt: None   Principal Problem: None                Active Insurance as of 6/19/2022     Primary Coverage     Payor Plan Insurance Group Employer/Plan Group    MEDICARE MEDICARE A & B      Payor Plan Address Payor Plan Phone Number Payor Plan Fax Number Effective Dates    [address] [phone]  2/1/2001 - None Entered    formerly [facility] [address]       Subscriber Name Subscriber Birth Date Member ID       KHLOE SINGH 1940 [id number]           Secondary Coverage     Payor Plan Insurance Group Employer/Plan Group    [place]      Payor Plan Address Payor Plan Phone Number Payor Plan Fax Number Effective Dates    [address] [phone]  5/1/2018 - None Entered    [place] [address]       Subscriber Name Subscriber Birth Date Member ID       KHLOE SINGH 1940 [id number]                 Emergency Contacts      (Rel.) Home Phone Work Phone Mobile Phone    Eric Garrett (Other) [phone] -- --    DAVION DENTON (Relative) -- -- [phone]            Emergency " Contact Information     Name Relation Home Work Mobile    Eric Garrett [phone]      DAVION DENTON Relative   [phone]          Insurance Information                MEDICARE/MEDICARE A & B Phone: [phone]    Subscriber: Tamara Singh Subscriber#: [id number]    Group#: -- Precert#: --        [place] Phone: [phone]    Subscriber: Tamara Singh Subscriber#: [id number]    Group#: -- Precert#: --

## 2022-06-20 NOTE — OUTREACH NOTE
Prep Survey    Flowsheet Row Responses   Gnosticism facility patient discharged from? [facility]   Does the patient have one of the following disease processes/diagnoses(primary or secondary)? COPD/Pneumonia          RADHA MCMANUS - Registered Nurse

## 2022-06-20 NOTE — PLAN OF CARE
Problem: Adult Inpatient Plan of Care  Goal: Plan of Care Review  Outcome: Ongoing, Progressing  Flowsheets  Taken 6/20/2022 1436 by Manda Contreras, RN  Plan of Care Reviewed With:   patient   caregiver  Outcome Evaluation: Fluids started at 75 mL/h.  Eating well.  1500 fluid restriction.  Caregivers at bedside throughout shift.  Taken 6/20/2022 0238 by Janiya Izquierdo RN  Progress: improving   Goal Outcome Evaluation:  Plan of Care Reviewed With: patient, caregiver           Outcome Evaluation: Fluids started at 75 mL/h.  Eating well.  1500 fluid restriction.  Caregivers at bedside throughout shift.

## 2022-06-20 NOTE — PLAN OF CARE
Goal Outcome Evaluation:  Plan of Care Reviewed With: patient        Progress: improving  Outcome Evaluation: patient taken off bipap by RT at start of shift, pt tolerated NC on 2L throughout the night. Pt complained of headache, md paged at this time.

## 2022-06-20 NOTE — PLAN OF CARE
Goal Outcome Evaluation:  Plan of Care Reviewed With: family           Outcome Evaluation: Pt w/good appetite and po intake since admit. BMI is 17. Pt meets criteria for severe chronic malnutrition, see MSA. Does not want supplements at this time. RD staff monitoring.

## 2022-06-21 NOTE — PLAN OF CARE
Goal Outcome Evaluation:  Plan of Care Reviewed With: patient        Progress: no change  Outcome Evaluation: patient awake most of shift, fluid restriction in place, vss

## 2022-06-21 NOTE — PROGRESS NOTES
[facility] Medicine Services  INPATIENT PROGRESS NOTE    Length of Stay: 0  Date of Admission: 6/19/2022  Primary Care Physician: Jean Pierre Scanlon MD    Subjective   Chief Complaint: Shortness of air.    HPI: Patient is seen for follow-up today 6/21/2022.  She is slowly improving, remains short of air, deconditioned and on her baseline supplemental oxygen of 3 L nasal prong with saturation in the upper 90s.  She appears visibly anxious.    Review of Systems   Constitutional: Positive for activity change and fatigue. Negative for appetite change, chills, diaphoresis and fever.   HENT: Negative for trouble swallowing and voice change.    Eyes: Negative for photophobia and visual disturbance.   Respiratory: Positive for cough and shortness of breath. Negative for choking, chest tightness, wheezing and stridor.    Cardiovascular: Negative for chest pain, palpitations and leg swelling.   Gastrointestinal: Negative for abdominal distention, abdominal pain, blood in stool, constipation, diarrhea, nausea and vomiting.   Endocrine: Negative for cold intolerance, heat intolerance, polydipsia, polyphagia and polyuria.   Genitourinary: Negative for decreased urine volume, difficulty urinating, dysuria, enuresis, flank pain, frequency, hematuria and urgency.   Musculoskeletal: Negative for arthralgias, gait problem, myalgias, neck pain and neck stiffness.   Skin: Negative for pallor, rash and wound.   Neurological: Negative for dizziness, tremors, seizures, syncope, facial asymmetry, speech difficulty, weakness, light-headedness, numbness and headaches.   Hematological: Does not bruise/bleed easily.   Psychiatric/Behavioral: Negative for agitation, behavioral problems and confusion. The patient is nervous/anxious.        Objective    Temp:  [97.5 °F (36.4 °C)-98.6 °F (37 °C)] 97.7 °F (36.5 °C)  Heart Rate:  [] 94  Resp:  [18-22] 20  BP: (143-178)/(66-95) 178/78    Physical  Exam  Vitals and nursing note reviewed.   Constitutional:       General: She is not in acute distress.     Appearance: She is well-developed. She is cachectic. She is ill-appearing. She is not diaphoretic.      Comments: Patient is cachectic and severely malnourished.   HENT:      Head: Normocephalic and atraumatic.      Right Ear: External ear normal.      Left Ear: External ear normal.      Nose: Nose normal.   Eyes:      Extraocular Movements: Extraocular movements intact.      Conjunctiva/sclera: Conjunctivae normal.      Pupils: Pupils are equal, round, and reactive to light.   Neck:      Thyroid: No thyromegaly.      Vascular: No JVD.   Cardiovascular:      Rate and Rhythm: Normal rate and regular rhythm.      Heart sounds: Normal heart sounds. No murmur heard.    No friction rub. No gallop.   Pulmonary:      Effort: Pulmonary effort is normal. No respiratory distress.      Breath sounds: No stridor. Decreased breath sounds and rhonchi present. No wheezing or rales.   Chest:      Chest wall: No tenderness.   Abdominal:      General: Bowel sounds are normal. There is no distension.      Palpations: Abdomen is soft. There is no mass.      Tenderness: There is no abdominal tenderness. There is no right CVA tenderness, left CVA tenderness, guarding or rebound.      Hernia: No hernia is present.   Musculoskeletal:         General: No swelling, tenderness or deformity. Normal range of motion.      Cervical back: Normal range of motion and neck supple.      Right lower leg: No edema.      Left lower leg: No edema.   Skin:     General: Skin is warm and dry.      Coloration: Skin is not jaundiced or pale.      Findings: No bruising, erythema, lesion or rash.   Neurological:      General: No focal deficit present.      Mental Status: She is alert and oriented to person, place, and time.      Cranial Nerves: No cranial nerve deficit.      Sensory: No sensory deficit.      Motor: No weakness.      Coordination:  Coordination normal.      Gait: Gait normal.      Deep Tendon Reflexes: Reflexes are normal and symmetric. Reflexes normal.   Psychiatric:         Mood and Affect: Mood is anxious.         Behavior: Behavior normal. Behavior is cooperative.         Thought Content: Thought content normal.         Judgment: Judgment normal.           Medication Review:    Current Facility-Administered Medications:   •  budesonide-formoterol (SYMBICORT) 160-4.5 MCG/ACT inhaler 2 puff, 2 puff, Inhalation, BID - RT, Lino Cartwright MD, 2 puff at 06/21/22 1142  •  docusate sodium (COLACE) capsule 100 mg, 100 mg, Oral, BID, Timothy Shields MD, 100 mg at 06/21/22 0813  •  guaiFENesin (MUCINEX) 12 hr tablet 1,200 mg, 1,200 mg, Oral, Q12H, Timothy Shields MD, 1,200 mg at 06/21/22 0813  •  ipratropium-albuterol (DUO-NEB) nebulizer solution 3 mL, 3 mL, Nebulization, Q4H PRN, Behroozi, Saeid, MD, 3 mL at 06/21/22 1142  •  latanoprost (XALATAN) 0.005 % ophthalmic solution 1 drop, 1 drop, Both Eyes, Nightly, Timothy Shields MD, 1 drop at 06/20/22 2102  •  lisinopril (PRINIVIL,ZESTRIL) tablet 20 mg, 20 mg, Oral, Daily, Timothy Shields MD, 20 mg at 06/21/22 0812  •  methylPREDNISolone sodium succinate (SOLU-Medrol) injection 40 mg, 40 mg, Intravenous, Q8H, Lino Cartwright MD  •  metoprolol tartrate (LOPRESSOR) half tablet 12.5 mg, 12.5 mg, Oral, Q12H, Timothy Shields MD, 12.5 mg at 06/21/22 0812  •  morphine injection 1 mg, 1 mg, Intravenous, Q6H PRN, Lino Cartwright MD  •  potassium chloride (KLOR-CON) packet 40 mEq, 40 mEq, Oral, Daily, Timothy Shields MD, 40 mEq at 06/21/22 0813  •  sertraline (ZOLOFT) tablet 25 mg, 25 mg, Oral, Daily, Timothy Shields MD, 25 mg at 06/21/22 0813  •  sodium chloride 0.9 % flush 10 mL, 10 mL, Intravenous, PRN, Timothy Shields MD  •  sodium chloride 0.9 % flush 10 mL, 10  mL, Intravenous, Q12H, Timothy Shields MD, 10 mL at 06/21/22 0815  •  sodium chloride 0.9 % flush 10 mL, 10 mL, Intravenous, PRN, Timothy Shields MD    Results Review:  I have reviewed the labs, radiology results, and diagnostic studies.    Laboratory Data:   Results from last 7 days   Lab Units 06/21/22  0544 06/20/22  0533 06/19/22  1337   SODIUM mmol/L 140 154* 145   POTASSIUM mmol/L 3.7 3.8 4.2   CHLORIDE mmol/L 97* 103 103   CO2 mmol/L 36.0* 34.0* 33.0*   BUN mg/dL 30* 34* 31*   CREATININE mg/dL 0.96 1.07* 0.93   GLUCOSE mg/dL 95 80 126*   CALCIUM mg/dL 8.1* 8.4* 8.7   BILIRUBIN mg/dL  --   --  0.2   ALK PHOS U/L  --   --  149*   ALT (SGPT) U/L  --   --  56*   AST (SGOT) U/L  --   --  44*   ANION GAP mmol/L 7.0 17.0* 9.0     Estimated Creatinine Clearance: 31.5 mL/min (by C-G formula based on SCr of 0.96 mg/dL).          Results from last 7 days   Lab Units 06/21/22  0544 06/20/22  0702 06/19/22  1337   WBC 10*3/mm3 5.35 6.92 6.63   HEMOGLOBIN g/dL 10.0* 10.5* 10.7*   HEMATOCRIT % 31.0* 33.4* 36.5   PLATELETS 10*3/mm3 129* 135* 105*           Culture Data:   No results found for: BLOODCX  No results found for: URINECX  No results found for: RESPCX  No results found for: WOUNDCX  No results found for: STOOLCX  No components found for: BODYFLD    Radiology Data:   Imaging Results (Last 24 Hours)     ** No results found for the last 24 hours. **          I have reviewed the patient's current medications.     Assessment/Plan     Hospital Problem List:  Active Problems:    SOB (shortness of breath)    Acute on chronic respiratory failure with hypoxia and hypercapnia secondary to acute on chronic systolic heart failure/COPD exacerbation: Slowly improving.  Continue noninvasive respiratory therapy, diuretics, bronchodilators, steroids with strict I's and O's, daily weights, salt and fluid restriction.  Repeat proBNP and chest x-ray in am.  Echocardiogram done 6/2/2022 showed:  · Left  ventricular ejection fraction appears to be 46 - 50%.  · Left ventricular diastolic function was normal.  · Moderate tricuspid valve regurgitation is present.  · Estimated right ventricular systolic pressure from tricuspid regurgitation is moderately elevated (45-55 mmHg).  · Moderate pulmonary hypertension is present.  · The following left ventricular wall segments are hypokinetic: mid inferoseptal    Hypernatremia (likely arising from overdiuresis): Resolved.  Discontinue D5W and continue to monitor BMP.   Mild diuretic induced azotemia has resolved.      Severe malnutrition: Dietitian is following.    Hypertension: Stable.  Continue lisinopril and metoprolol with adjustments as needed to achieve optimal blood pressure control.    Anxiety/depressive disorder: Continue Zoloft.    Deconditioning: Continue PT and OT.    Continue GI and DVT prophylaxis.    Update was given to patient's daughter who was at the bedside.    CODE STATUS is 'no intubation and no CPR'.    Discharge Planning: In progress.    I confirmed that the patient's Advance Care Plan is present, code status is documented, or surrogate decision maker is listed in the patient's medical record.     I have utilized all available immediate resources to obtain, update, or review the patient's current medications      Lino Cartwright MD   06/21/22   12:02 CDT

## 2022-06-21 NOTE — PLAN OF CARE
Problem: Adult Inpatient Plan of Care  Goal: Plan of Care Review  Outcome: Ongoing, Progressing  Flowsheets (Taken 6/21/2022 1437)  Progress: no change  Plan of Care Reviewed With:   patient   caregiver  Outcome Evaluation: Lasix given X1. Morphine started for work of breathing, given once with good effect.  Pt short of air at rest and can get extremely short of air on exertion.  Caregivers at bedside throughout shift.   Goal Outcome Evaluation:  Plan of Care Reviewed With: patient, caregiver        Progress: no change  Outcome Evaluation: Lasix given X1. Morphine started for work of breathing, given once with good effect.  Pt short of air at rest and can get extremely short of air on exertion.  Caregivers at bedside throughout shift.

## 2022-06-22 NOTE — CONSULTS
Adult Nutrition  Assessment    Patient Name:  Tamara Singh  YOB: 1940  MRN: [id number]  Admit Date:  6/19/2022    Assessment Date:  6/22/2022    Comments:  RD f/u. Pt w/very good po intake, usually %. Weight appears stable. Labs: Gl 125, K 5.9, BUN 29. Declines any supplements. RD will monitor.     Reason for Assessment     Row Name 06/22/22 1117          Reason for Assessment    Reason For Assessment follow-up protocol                Nutrition/Diet History     Row Name 06/22/22 1117          Nutrition/Diet History    Typical Intake (Food/Fluid/EN/PN) Family member in room reports pt is eating well and tolerating consistency. No prefs/requests. Denies need for supplement.                  Labs/Tests/Procedures/Meds     Row Name 06/22/22 1118          Labs/Procedures/Meds    Lab Results Reviewed reviewed, pertinent     Lab Results Comments Gl 125, K 5.9, BUN 29            Medications    Pertinent Medications Reviewed reviewed                    Nutrition Prescription Ordered     Row Name 06/22/22 1118          Nutrition Prescription PO    Current PO Diet Soft Texture     Texture Chopped     Common Modifiers Cardiac     Fluid Restriction mL per Day 1500 mL                Evaluation of Received Nutrient/Fluid Intake     Row Name 06/22/22 1118          PO Evaluation    Number of Meals 7     % PO Intake 25 x 1, 75 x 3, 100 x 3                     Electronically signed by:  Coral Clark RD  06/22/22 11:24 CDT

## 2022-06-22 NOTE — PROGRESS NOTES
[facility]  INPATIENT PROGRESS NOTE    Length of Stay: 0  Date of Admission: 6/19/2022  Primary Care Physician: Jean Pierre Scanlon MD    Subjective   Chief Complaint: Shortness of air.    HPI: Patient is seen for follow-up today 6/22/2022.  She is doing better at this morning, less short of air, less anxious, deconditioned and on her baseline supplemental oxygen of 3 L nasal prong with saturation in the upper 90s.      Review of Systems   Constitutional: Positive for activity change and fatigue. Negative for appetite change, chills, diaphoresis and fever.   HENT: Negative for trouble swallowing and voice change.    Eyes: Negative for photophobia and visual disturbance.   Respiratory: Positive for cough and shortness of breath. Negative for choking, chest tightness, wheezing and stridor.    Cardiovascular: Negative for chest pain, palpitations and leg swelling.   Gastrointestinal: Negative for abdominal distention, abdominal pain, blood in stool, constipation, diarrhea, nausea and vomiting.   Endocrine: Negative for cold intolerance, heat intolerance, polydipsia, polyphagia and polyuria.   Genitourinary: Negative for decreased urine volume, difficulty urinating, dysuria, enuresis, flank pain, frequency, hematuria and urgency.   Musculoskeletal: Negative for arthralgias, gait problem, myalgias, neck pain and neck stiffness.   Skin: Negative for pallor, rash and wound.   Neurological: Negative for dizziness, tremors, seizures, syncope, facial asymmetry, speech difficulty, weakness, light-headedness, numbness and headaches.   Hematological: Does not bruise/bleed easily.   Psychiatric/Behavioral: Negative for agitation, behavioral problems and confusion. The patient is nervous/anxious.        Objective    Temp:  [97.6 °F (36.4 °C)-98 °F (36.7 °C)] 98 °F (36.7 °C)  Heart Rate:  [] 101  Resp:  [16-20] 20  BP: (125-162)/(59-84) 162/72    Physical Exam  Vitals  and nursing note reviewed.   Constitutional:       General: She is not in acute distress.     Appearance: She is well-developed. She is cachectic. She is ill-appearing. She is not diaphoretic.      Comments: Patient is cachectic and severely malnourished.   HENT:      Head: Normocephalic and atraumatic.      Right Ear: External ear normal.      Left Ear: External ear normal.      Nose: Nose normal.   Eyes:      Extraocular Movements: Extraocular movements intact.      Conjunctiva/sclera: Conjunctivae normal.      Pupils: Pupils are equal, round, and reactive to light.   Neck:      Thyroid: No thyromegaly.      Vascular: No JVD.   Cardiovascular:      Rate and Rhythm: Normal rate and regular rhythm.      Heart sounds: Normal heart sounds. No murmur heard.    No friction rub. No gallop.   Pulmonary:      Effort: Pulmonary effort is normal. No respiratory distress.      Breath sounds: No stridor. Decreased breath sounds and rhonchi present. No wheezing or rales.   Chest:      Chest wall: No tenderness.   Abdominal:      General: Bowel sounds are normal. There is no distension.      Palpations: Abdomen is soft. There is no mass.      Tenderness: There is no abdominal tenderness. There is no right CVA tenderness, left CVA tenderness, guarding or rebound.      Hernia: No hernia is present.   Musculoskeletal:         General: No swelling, tenderness or deformity. Normal range of motion.      Cervical back: Normal range of motion and neck supple.      Right lower leg: No edema.      Left lower leg: No edema.   Skin:     General: Skin is warm and dry.      Coloration: Skin is not jaundiced or pale.      Findings: No bruising, erythema, lesion or rash.   Neurological:      General: No focal deficit present.      Mental Status: She is alert and oriented to person, place, and time.      Cranial Nerves: No cranial nerve deficit.      Sensory: No sensory deficit.      Motor: No weakness.      Coordination: Coordination normal.       Gait: Gait normal.      Deep Tendon Reflexes: Reflexes are normal and symmetric. Reflexes normal.   Psychiatric:         Mood and Affect: Mood is anxious.         Behavior: Behavior normal. Behavior is cooperative.         Thought Content: Thought content normal.         Judgment: Judgment normal.           Medication Review:    Current Facility-Administered Medications:   •  budesonide-formoterol (SYMBICORT) 160-4.5 MCG/ACT inhaler 2 puff, 2 puff, Inhalation, BID - RT, Lino Cartwright MD, 2 puff at 06/22/22 0729  •  docusate sodium (COLACE) capsule 100 mg, 100 mg, Oral, BID, Timothy Shields MD, 100 mg at 06/21/22 2041  •  furosemide (LASIX) injection 20 mg, 20 mg, Intravenous, Daily, Lino Cartwright MD, 20 mg at 06/22/22 0833  •  guaiFENesin (MUCINEX) 12 hr tablet 1,200 mg, 1,200 mg, Oral, Q12H, Timothy Shields MD, 1,200 mg at 06/22/22 0834  •  hydrALAZINE (APRESOLINE) injection 10 mg, 10 mg, Intravenous, Q6H PRN, Lino Cartwright MD  •  ipratropium-albuterol (DUO-NEB) nebulizer solution 3 mL, 3 mL, Nebulization, Q4H PRN, Behroozi, Saeid, MD, 3 mL at 06/22/22 0729  •  latanoprost (XALATAN) 0.005 % ophthalmic solution 1 drop, 1 drop, Both Eyes, Nightly, Timothy Shields MD, 1 drop at 06/21/22 2042  •  lisinopril (PRINIVIL,ZESTRIL) tablet 20 mg, 20 mg, Oral, Daily, Timothy Shields MD, 20 mg at 06/22/22 0834  •  methylPREDNISolone sodium succinate (SOLU-Medrol) injection 40 mg, 40 mg, Intravenous, Q8H, Lino Cartwright MD, 40 mg at 06/22/22 0524  •  metoprolol tartrate (LOPRESSOR) half tablet 12.5 mg, 12.5 mg, Oral, Q12H, Timothy Shields MD, 12.5 mg at 06/22/22 0834  •  morphine injection 1 mg, 1 mg, Intravenous, Q6H PRN, Lino Cartwright MD, 1 mg at 06/21/22 1324  •  sertraline (ZOLOFT) tablet 25 mg, 25 mg, Oral, Daily, Timothy Shields MD, 25 mg at 06/22/22 0834  •  sodium chloride 0.9 % flush 10  mL, 10 mL, Intravenous, PRN, Timothy Shields MD  •  sodium chloride 0.9 % flush 10 mL, 10 mL, Intravenous, Q12H, Timothy Shields MD, 10 mL at 06/22/22 0834  •  sodium chloride 0.9 % flush 10 mL, 10 mL, Intravenous, PRN, Timothy Shields MD  •  sodium zirconium cyclosilicate (LOKELMA) pack 5 g, 5 g, Oral, Once, Lino Cartwright MD    Results Review:  I have reviewed the labs, radiology results, and diagnostic studies.    Laboratory Data:   Results from last 7 days   Lab Units 06/22/22  0915 06/22/22  0746 06/21/22  0544 06/20/22  0533 06/19/22  1337   SODIUM mmol/L  --  143 140 154* 145   POTASSIUM mmol/L 5.9* 6.5* 3.7 3.8 4.2   CHLORIDE mmol/L  --  99 97* 103 103   CO2 mmol/L  --  38.0* 36.0* 34.0* 33.0*   BUN mg/dL  --  29* 30* 34* 31*   CREATININE mg/dL  --  0.90 0.96 1.07* 0.93   GLUCOSE mg/dL  --  125* 95 80 126*   CALCIUM mg/dL  --  9.0 8.1* 8.4* 8.7   BILIRUBIN mg/dL  --   --   --   --  0.2   ALK PHOS U/L  --   --   --   --  149*   ALT (SGPT) U/L  --   --   --   --  56*   AST (SGOT) U/L  --   --   --   --  44*   ANION GAP mmol/L  --  6.0 7.0 17.0* 9.0     Estimated Creatinine Clearance: 33.6 mL/min (by C-G formula based on SCr of 0.9 mg/dL).  Results from last 7 days   Lab Units 06/22/22  0613   MAGNESIUM mg/dL 2.2         Results from last 7 days   Lab Units 06/22/22  0613 06/21/22  0544 06/20/22  0702 06/19/22  1337   WBC 10*3/mm3 10.87* 5.35 6.92 6.63   HEMOGLOBIN g/dL 10.5* 10.0* 10.5* 10.7*   HEMATOCRIT % 34.6 31.0* 33.4* 36.5   PLATELETS 10*3/mm3 138* 129* 135* 105*           Culture Data:   No results found for: BLOODCX  No results found for: URINECX  No results found for: RESPCX  No results found for: WOUNDCX  No results found for: STOOLCX  No components found for: BODYFLD    Radiology Data:   Imaging Results (Last 24 Hours)     Procedure Component Value Units Date/Time    XR Chest PA & Lateral [[id number]] Resulted: 06/22/22 0652     Updated:  06/22/22 8925          I have reviewed the patient's current medications.     Assessment/Plan     Hospital Problem List:  Active Problems:    SOB (shortness of breath)    Acute on chronic respiratory failure with hypoxia and hypercapnia secondary to acute on chronic systolic heart failure/COPD exacerbation: Slowly improving.  Continue noninvasive respiratory therapy, diuretics, bronchodilators, steroids with strict I's and O's, daily weights, salt and fluid restriction.  Result of repeat chest x-ray for this a.m. is pending.   Echocardiogram done 6/2/2022 showed:  · Left ventricular ejection fraction appears to be 46 - 50%.  · Left ventricular diastolic function was normal.  · Moderate tricuspid valve regurgitation is present.  · Estimated right ventricular systolic pressure from tricuspid regurgitation is moderately elevated (45-55 mmHg).  · Moderate pulmonary hypertension is present.  · The following left ventricular wall segments are hypokinetic: mid inferoseptal    Hypernatremia (likely arising from overdiuresis): Resolved. Mild diuretic induced azotemia has resolved.    Hyperkalemia: Patient is currently receiving daily potassium repletion.  Discontinue daily potassium supplementation and give a dose of Lokelma.  Repeat BMP later today.    Severe malnutrition: Dietitian is following.    Hypertension: Stable.  Continue lisinopril and metoprolol with adjustments as needed to achieve optimal blood pressure control.    Anxiety/depressive disorder: Continue Zoloft.    Deconditioning: Continue PT and OT.    Continue GI and DVT prophylaxis.    Update was given to patient's daughter who was at the bedside.    CODE STATUS is 'no intubation and no CPR'.    Discharge Planning: In progress.    I confirmed that the patient's Advance Care Plan is present, code status is documented, or surrogate decision maker is listed in the patient's medical record.     I have utilized all available immediate resources to obtain, update,  or review the patient's current medications      Lino Cartwright MD   06/22/22   10:07 CDT

## 2022-06-22 NOTE — NURSING NOTE
Pts HR still running high after 1st dose of cardizem was given. MD made aware. 10 mg cardizem ordered again per MD. MD stated to let him know if this dose didn't help. So far pts HR staying in the 80s. Will cont to monitor pts HR closely.

## 2022-06-22 NOTE — PLAN OF CARE
Problem: Adult Inpatient Plan of Care  Goal: Plan of Care Review  Outcome: Ongoing, Progressing  Flowsheets  Taken 6/22/2022 1636 by Manda Contreras, RN  Progress: no change  Outcome Evaluation: Potassium elevated, lokelma given.  No other concerns this shift.  At baseline O2.  Educated pt on fluid and sidium restriction as well as the importance of weighing each morning.  Call light easily within reach.  Will continue to monitor.  Taken 6/22/2022 0459 by Vita Franco, RN  Plan of Care Reviewed With: patient   Goal Outcome Evaluation:  Plan of Care Reviewed With: patient, caregiver        Progress: no change  Outcome Evaluation: Potassium elevated, lokelma given.  No other concerns this shift.  At baseline O2.  Educated pt on fluid and sidium restriction as well as the importance of weighing each morning.  Call light easily within reach.  Will continue to monitor.

## 2022-06-22 NOTE — NURSING NOTE
Tele called this RN saying the pts HR was around the 130s and it looked like AFIB. Pt has no history of AFIB. STAT EKG ordered. EKG showed AFIB w/ RVR with a HR of 143. MD made aware. See orders.

## 2022-06-22 NOTE — PLAN OF CARE
Goal Outcome Evaluation:  Plan of Care Reviewed With: patient        Progress: declining  Outcome Evaluation: pt went into afib w/ rvr. pt has no hx of afib. MD made aware. 10 mg cardizem given by cardiac nurse. otherwise, no signs of distress at this time.

## 2022-06-23 NOTE — DISCHARGE PLACEMENT REQUEST
"Please call [phone] thanks    Khloe Singh (82 y.o. Female)             Date of Birth   1940    Social Security Number       Address   [address] [place], [place] [address]    Home Phone   [phone]    MRN   [id number]       Scientology   [employer]    Marital Status                               Admission Date   6/19/22    Admission Type   Emergency    Admitting Provider   Timothy Shields MD    Attending Provider   Timothy Shields MD    Department, Room/Bed   [address], 371/1       Discharge Date       Discharge Disposition   Home-Health Care [facility]    Discharge Destination                               Attending Provider: Timothy Shields MD    Allergies: Ativan [Lorazepam]    Isolation: None   Infection: None   Code Status: No CPR   Advance Care Planning Activity    Ht: 149.9 cm (59\")   Wt: 44.6 kg (98 lb 4 oz)    Admission Cmt: None   Principal Problem: None                Active Insurance as of 6/19/2022     Primary Coverage     Payor Plan Insurance Group Employer/Plan Group    MEDICARE MEDICARE A & B      Payor Plan Address Payor Plan Phone Number Payor Plan Fax Number Effective Dates    [address] [phone]  2/1/2001 - None Entered    [facility] [id number]       Subscriber Name Subscriber Birth Date Member ID       KHLOE SINGH 1940 [id number]           Secondary Coverage     Payor Plan Insurance Group Employer/Plan Group    [place]      Payor Plan Address Payor Plan Phone Number Payor Plan Fax Number Effective Dates    [address] [phone]  5/1/2018 - None Entered    [place] [address]       Subscriber Name Subscriber Birth Date Member ID       KHLOE SINGH 1940 [id number]                 Emergency Contacts      (Rel.) Home Phone Work Phone Mobile Phone    Eric Garrett (Other) [phone] -- --    " "DAVION DENTON (Relative) -- -- [phone]          [address] [address]  Dept. Phone:  [phone]  Dept. Fax:  [phone] Date Ordered: 2022         Patient:  Tamara Singh MRN:  [id number]   [address] [place] [id number] :  1940  SSN:    Phone: [phone] Sex:  F     Weight: 44.6 kg (98 lb 4 oz)         Ht Readings from Last 1 Encounters:   22 149.9 cm (59\")         Hospital Bed  (Order ID: [id number])    Diagnosis:  Acute on chronic respiratory failure with hypoxia and hypercapnia (HCC) (J96.21,J96.22 [ICD-10-CM] 518.84,786.09,799.02 [ICD-9-CM])   Quantity:  1     Equipment:  Hospital Bed, Semi-Electirc w/ Mattress & w/ Rails  Length of Need (99 Months = Lifetime): 99 Months = Lifetime        Authorizing Provider's Phone: [phone]  Authorizing Provider:Lino Cartwright MD  Authorizing Provider's NPI: [id number]  Order Entered By: Lino Cartwright MD 2022  9:23 AM     Electronically signed by: Lino Cartwright MD 2022  9:23 AM         Emergency Contact Information     Name Relation Home Work Mobile    Eric Garrett [phone]      DAVION DENTON Relative   [phone]          Insurance Information                MEDICARE/MEDICARE A & B Phone: [phone]    Subscriber: Tamara Singh Subscriber#: [id number]    Group#: -- Precert#: --        [place] Phone: [phone]    Subscriber: Tamara Singh Subscriber#: [id number]    Group#: -- Precert#: --          "

## 2022-06-23 NOTE — DISCHARGE SUMMARY
[facility] Medicine Services  DISCHARGE SUMMARY       Date of Admission: 6/19/2022  Date of Discharge:  6/23/2022  Primary Care Physician: Jean Pierre Scanlon MD    Presenting Problem/History of Present Illness:  SOB (shortness of breath) [R06.02]  Acute on chronic respiratory failure with hypoxia and hypercapnia (HCC) [J96.21, J96.22]       Final Discharge Diagnoses:  Active Hospital Problems    Diagnosis    • SOB (shortness of breath)        Consults:   Consults     Date and Time Order Name Status Description    6/2/2022  1:54 PM Inpatient Cardiology Consult Completed           Procedures Performed: None.                Pertinent Test Results:   Lab Results (last 7 days)     Procedure Component Value Units Date/Time    Basic Metabolic Panel [[id number]]  (Abnormal) Collected: 06/23/22 0534    Specimen: Blood Updated: 06/23/22 0628     Glucose 140 mg/dL      BUN 37 mg/dL      Creatinine 0.86 mg/dL      Sodium 142 mmol/L      Potassium 4.9 mmol/L      Chloride 96 mmol/L      CO2 42.0 mmol/L      Calcium 8.9 mg/dL      BUN/Creatinine Ratio 43.0     Anion Gap 4.0 mmol/L      eGFR 68.0 mL/min/1.73      Comment: National Kidney Foundation and American Society of Nephrology (ASN) Task Force recommended calculation based on the Chronic Kidney Disease Epidemiology Collaboration (CKD-EPI) equation refit without adjustment for race.       Narrative:      GFR Normal >60  Chronic Kidney Disease <60  Kidney Failure <15      CBC & Differential [[id number]]  (Abnormal) Collected: 06/23/22 0534    Specimen: Blood Updated: 06/23/22 0627    Narrative:      The following orders were created for panel order CBC & Differential.  Procedure                               Abnormality         Status                     ---------                               -----------         ------                     CBC Auto Differential[[id number]]        Abnormal            Final result               Scan  Slide[[id number]]                                                                    Please view results for these tests on the individual orders.    CBC Auto Differential [[id number]]  (Abnormal) Collected: 06/23/22 0534    Specimen: Blood Updated: 06/23/22 0627     WBC 6.24 10*3/mm3      RBC 3.09 10*6/mm3      Hemoglobin 9.6 g/dL      Hematocrit 30.5 %      MCV 98.7 fL      MCH 31.1 pg      MCHC 31.5 g/dL      RDW 15.4 %      RDW-SD 55.8 fl      MPV 9.9 fL      Platelets 145 10*3/mm3      Neutrophil % 91.9 %      Lymphocyte % 5.8 %      Monocyte % 1.8 %      Eosinophil % 0.0 %      Basophil % 0.0 %      Immature Grans % 0.5 %      Neutrophils, Absolute 5.74 10*3/mm3      Lymphocytes, Absolute 0.36 10*3/mm3      Monocytes, Absolute 0.11 10*3/mm3      Eosinophils, Absolute 0.00 10*3/mm3      Basophils, Absolute 0.00 10*3/mm3      Immature Grans, Absolute 0.03 10*3/mm3      nRBC 0.0 /100 WBC     Basic Metabolic Panel [[id number]]  (Abnormal) Collected: 06/22/22 1811    Specimen: Blood Updated: 06/22/22 1929     Glucose 225 mg/dL      BUN 36 mg/dL      Creatinine 0.89 mg/dL      Sodium 139 mmol/L      Potassium 5.0 mmol/L      Chloride 93 mmol/L      CO2 33.0 mmol/L      Calcium 8.9 mg/dL      BUN/Creatinine Ratio 40.4     Anion Gap 13.0 mmol/L      eGFR 65.2 mL/min/1.73      Comment: National Kidney Foundation and American Society of Nephrology (ASN) Task Force recommended calculation based on the Chronic Kidney Disease Epidemiology Collaboration (CKD-EPI) equation refit without adjustment for race.       Narrative:      GFR Normal >60  Chronic Kidney Disease <60  Kidney Failure <15      Basic Metabolic Panel [[id number]]  (Abnormal) Collected: 06/22/22 0915    Specimen: Blood Updated: 06/22/22 1027     Glucose 139 mg/dL      BUN 29 mg/dL      Creatinine 0.85 mg/dL      Sodium 141 mmol/L      Potassium 6.0 mmol/L      Chloride 97 mmol/L      CO2 34.0 mmol/L      Calcium 8.8 mg/dL      BUN/Creatinine Ratio 34.1     Anion  Gap 10.0 mmol/L      eGFR 68.9 mL/min/1.73      Comment: National Kidney Foundation and American Society of Nephrology (ASN) Task Force recommended calculation based on the Chronic Kidney Disease Epidemiology Collaboration (CKD-EPI) equation refit without adjustment for race.       Narrative:      GFR Normal >60  Chronic Kidney Disease <60  Kidney Failure <15      Potassium [[id number]]  (Abnormal) Collected: 06/22/22 0915    Specimen: Blood Updated: 06/22/22 0944     Potassium 5.9 mmol/L     Basic Metabolic Panel [[id number]]  (Abnormal) Collected: 06/22/22 0746    Specimen: Blood Updated: 06/22/22 0855     Glucose 125 mg/dL      BUN 29 mg/dL      Creatinine 0.90 mg/dL      Sodium 143 mmol/L      Potassium 6.5 mmol/L      Chloride 99 mmol/L      CO2 38.0 mmol/L      Calcium 9.0 mg/dL      BUN/Creatinine Ratio 32.2     Anion Gap 6.0 mmol/L      eGFR 64.4 mL/min/1.73      Comment: National Kidney Foundation and American Society of Nephrology (ASN) Task Force recommended calculation based on the Chronic Kidney Disease Epidemiology Collaboration (CKD-EPI) equation refit without adjustment for race.       Narrative:      GFR Normal >60  Chronic Kidney Disease <60  Kidney Failure <15      Magnesium [[id number]]  (Normal) Collected: 06/22/22 0613    Specimen: Blood Updated: 06/22/22 0650     Magnesium 2.2 mg/dL     CBC & Differential [[id number]]  (Abnormal) Collected: 06/22/22 0613    Specimen: Blood Updated: 06/22/22 0640    Narrative:      The following orders were created for panel order CBC & Differential.  Procedure                               Abnormality         Status                     ---------                               -----------         ------                     CBC Auto Differential[[id number]]        Abnormal            Final result                 Please view results for these tests on the individual orders.    CBC Auto Differential [[id number]]  (Abnormal) Collected: 06/22/22 0613    Specimen: Blood  Updated: 06/22/22 0640     WBC 10.87 10*3/mm3      RBC 3.45 10*6/mm3      Hemoglobin 10.5 g/dL      Hematocrit 34.6 %      .3 fL      MCH 30.4 pg      MCHC 30.3 g/dL      RDW 15.3 %      RDW-SD 56.7 fl      MPV 9.7 fL      Platelets 138 10*3/mm3      Neutrophil % 87.7 %      Lymphocyte % 5.8 %      Monocyte % 5.8 %      Eosinophil % 0.0 %      Basophil % 0.1 %      Immature Grans % 0.6 %      Neutrophils, Absolute 9.53 10*3/mm3      Lymphocytes, Absolute 0.63 10*3/mm3      Monocytes, Absolute 0.63 10*3/mm3      Eosinophils, Absolute 0.00 10*3/mm3      Basophils, Absolute 0.01 10*3/mm3      Immature Grans, Absolute 0.07 10*3/mm3      nRBC 0.0 /100 WBC     Basic Metabolic Panel [[id number]]  (Abnormal) Collected: 06/21/22 0544    Specimen: Blood Updated: 06/21/22 0628     Glucose 95 mg/dL      BUN 30 mg/dL      Creatinine 0.96 mg/dL      Sodium 140 mmol/L      Potassium 3.7 mmol/L      Chloride 97 mmol/L      CO2 36.0 mmol/L      Calcium 8.1 mg/dL      BUN/Creatinine Ratio 31.3     Anion Gap 7.0 mmol/L      eGFR 59.6 mL/min/1.73      Comment: National Kidney Foundation and American Society of Nephrology (ASN) Task Force recommended calculation based on the Chronic Kidney Disease Epidemiology Collaboration (CKD-EPI) equation refit without adjustment for race.       Narrative:      GFR Normal >60  Chronic Kidney Disease <60  Kidney Failure <15      CBC & Differential [[id number]]  (Abnormal) Collected: 06/21/22 0544    Specimen: Blood Updated: 06/21/22 0609    Narrative:      The following orders were created for panel order CBC & Differential.  Procedure                               Abnormality         Status                     ---------                               -----------         ------                     CBC Auto Differential[[id number]]        Abnormal            Final result               Scan Slide[[id number]]                                                                    Please view results for  these tests on the individual orders.    CBC Auto Differential [[id number]]  (Abnormal) Collected: 06/21/22 0544    Specimen: Blood Updated: 06/21/22 0609     WBC 5.35 10*3/mm3      RBC 3.17 10*6/mm3      Hemoglobin 10.0 g/dL      Hematocrit 31.0 %      MCV 97.8 fL      MCH 31.5 pg      MCHC 32.3 g/dL      RDW 15.2 %      RDW-SD 54.6 fl      MPV 10.0 fL      Platelets 129 10*3/mm3      Neutrophil % 75.1 %      Lymphocyte % 15.7 %      Monocyte % 8.4 %      Eosinophil % 0.2 %      Basophil % 0.2 %      Immature Grans % 0.4 %      Neutrophils, Absolute 4.02 10*3/mm3      Lymphocytes, Absolute 0.84 10*3/mm3      Monocytes, Absolute 0.45 10*3/mm3      Eosinophils, Absolute 0.01 10*3/mm3      Basophils, Absolute 0.01 10*3/mm3      Immature Grans, Absolute 0.02 10*3/mm3      nRBC 0.0 /100 WBC     Vitamin B12 [[id number]]  (Normal) Collected: 06/20/22 0533    Specimen: Blood Updated: 06/20/22 1326     Vitamin B-12 738 pg/mL     Narrative:      Results may be falsely increased if patient taking Biotin.      Basic Metabolic Panel [[id number]]  (Abnormal) Collected: 06/20/22 0533    Specimen: Blood Updated: 06/20/22 0730     Glucose 80 mg/dL      BUN 34 mg/dL      Creatinine 1.07 mg/dL      Sodium 154 mmol/L      Potassium 3.8 mmol/L      Comment: Slight hemolysis detected by analyzer. Results may be affected.        Chloride 103 mmol/L      CO2 34.0 mmol/L      Calcium 8.4 mg/dL      BUN/Creatinine Ratio 31.8     Anion Gap 17.0 mmol/L      eGFR 52.3 mL/min/1.73      Comment: National Kidney Foundation and American Society of Nephrology (ASN) Task Force recommended calculation based on the Chronic Kidney Disease Epidemiology Collaboration (CKD-EPI) equation refit without adjustment for race.       Narrative:      GFR Normal >60  Chronic Kidney Disease <60  Kidney Failure <15      CBC (No Diff) [[id number]]  (Abnormal) Collected: 06/20/22 0702    Specimen: Blood Updated: 06/20/22 0720     WBC 6.92 10*3/mm3      RBC 3.37 10*6/mm3       Hemoglobin 10.5 g/dL      Hematocrit 33.4 %      MCV 99.1 fL      MCH 31.2 pg      MCHC 31.4 g/dL      RDW 15.9 %      RDW-SD 57.1 fl      MPV 9.4 fL      Platelets 135 10*3/mm3     Blood Gas, Arterial - [[id number]]  (Abnormal) Collected: 06/19/22 1915    Specimen: Arterial Blood Updated: 06/19/22 1918     Site Right Radial     Ruiz's Test N/A     pH, Arterial 7.412 pH units      pCO2, Arterial 67.0 mm Hg      Comment: 86 Value above critical limit        pO2, Arterial 220.0 mm Hg      Comment: 83 Value above reference range        HCO3, Arterial 42.6 mmol/L      Comment: 83 Value above reference range        Base Excess, Arterial 15.5 mmol/L      Comment: 83 Value above reference range        O2 Saturation, Arterial >100.0 %      Comment: 93 Value above reportable range > 100.0        Barometric Pressure for Blood Gas 751 mmHg      Modality Room Air     FIO2 70 %      Ventilator Mode AVAP     Set Tidal Volume 450     Set Mech Resp Rate 18.0     EPAP 10     Comment: Meter: [id number]     :  [id number]        Collected by RRT    TSH+Free T4 [[id number]]  (Normal) Collected: 06/19/22 1531    Specimen: Blood Updated: 06/19/22 1903     TSH 1.470 uIU/mL      Free T4 0.95 ng/dL      Comment: T4 results may be falsely increased if patient taking Biotin.       Extra Tubes [[id number]] Collected: 06/19/22 1527    Specimen: Blood, Venous Line Updated: 06/19/22 1647    Narrative:      The following orders were created for panel order Extra Tubes.  Procedure                               Abnormality         Status                     ---------                               -----------         ------                     Lavender Top[[id number]]                                     Final result               Lavender Top[[id number]]                                     Final result               [place] Blood Culture Corey...[[id number]]                      Final result               Green Top (Gel)[[id number]]                                                                Please view results for these tests on the individual orders.    Lavender Top [[id number]] Collected: 06/19/22 1534    Specimen: Blood Updated: 06/19/22 1647     Extra Tube hold for add-on     Comment: Auto resulted       Lavender Top [[id number]] Collected: 06/19/22 1534    Specimen: Blood Updated: 06/19/22 1647     Extra Tube hold for add-on     Comment: Auto resulted       [place] Blood Culture Bottle Set [[id number]] Collected: 06/19/22 1527    Specimen: Blood from Arm, Right Updated: 06/19/22 1633     Extra Tube Hold for add-ons.     Comment: Auto resulted.       [place] Draw [[id number]] Collected: 06/19/22 1337    Specimen: Blood Updated: 06/19/22 1633    Narrative:      The following orders were created for panel order [place] Draw.  Procedure                               Abnormality         Status                     ---------                               -----------         ------                     Green Top (Gel)[[id number]]                                  Final result               Lavender Top[[id number]]                                                                Gold Top - SST[[id number]]                                   Final result               Light Blue Top[[id number]]                                   Final result                 Please view results for these tests on the individual orders.    Light Blue Top [[id number]] Collected: 06/19/22 1531    Specimen: Blood Updated: 06/19/22 1633     Extra Tube Hold for add-ons.     Comment: Auto resulted       Gold Top - SST [[id number]] Collected: 06/19/22 1531    Specimen: Blood Updated: 06/19/22 1633     Extra Tube Hold for add-ons.     Comment: Auto resulted.       Lactic Acid, Plasma [[id number]]  (Normal) Collected: 06/19/22 1531    Specimen: Blood from Arm, Left Updated: 06/19/22 1552     Lactate 1.5 mmol/L     COVID-19 and FLU A/B PCR - Swab, Nasopharynx [[id number]]  (Normal) Collected: 06/19/22  1451    Specimen: Swab from Nasopharynx Updated: 06/19/22 1538     COVID19 Not Detected     Influenza A PCR Not Detected     Influenza B PCR Not Detected    Narrative:      Fact sheet for providers: https://www.fda.gov/media/896699/download    Fact sheet for patients: https://www.fda.gov/media/731842/download    Test performed by PCR.    Blood Gas, Arterial - [[id number]]  (Abnormal) Collected: 06/19/22 1450    Specimen: Arterial Blood Updated: 06/19/22 1452     Site Right Brachial     Ruiz's Test N/A     pH, Arterial 7.318 pH units      Comment: 84 Value below reference range        pCO2, Arterial 73.7 mm Hg      Comment: 86 Value above critical limit        pO2, Arterial 98.9 mm Hg      HCO3, Arterial 37.8 mmol/L      Comment: 83 Value above reference range        Base Excess, Arterial 9.5 mmol/L      Comment: 83 Value above reference range        O2 Saturation, Arterial 98.2 %      Barometric Pressure for Blood Gas 752 mmHg      Modality Nasal Cannula     FIO2 <21 %      Flow Rate 3.0 lpm      Ventilator Mode NA     Comment: Meter: [id number]     :  [id number]       Green Top (Gel) [[id number]] Collected: 06/19/22 1337    Specimen: Blood Updated: 06/19/22 1447     Extra Tube Hold for add-ons.     Comment: Auto resulted.       Troponin [[id number]]  (Normal) Collected: 06/19/22 1337    Specimen: Blood Updated: 06/19/22 1402     Troponin T 0.010 ng/mL     Narrative:      Troponin T Reference Range:  <= 0.03 ng/mL-   Negative for AMI  >0.03 ng/mL-     Abnormal for myocardial necrosis.  Clinicians would have to utilize clinical acumen, EKG, Troponin and serial changes to determine if it is an Acute Myocardial Infarction or myocardial injury due to an underlying chronic condition.       Results may be falsely decreased if patient taking Biotin.      Comprehensive Metabolic Panel [[id number]]  (Abnormal) Collected: 06/19/22 1337    Specimen: Blood Updated: 06/19/22 1359     Glucose 126 mg/dL      BUN 31 mg/dL       Creatinine 0.93 mg/dL      Sodium 145 mmol/L      Potassium 4.2 mmol/L      Chloride 103 mmol/L      CO2 33.0 mmol/L      Calcium 8.7 mg/dL      Total Protein 6.0 g/dL      Albumin 3.40 g/dL      ALT (SGPT) 56 U/L      AST (SGOT) 44 U/L      Alkaline Phosphatase 149 U/L      Total Bilirubin 0.2 mg/dL      Globulin 2.6 gm/dL      A/G Ratio 1.3 g/dL      BUN/Creatinine Ratio 33.3     Anion Gap 9.0 mmol/L      eGFR 61.9 mL/min/1.73      Comment: National Kidney Foundation and American Society of Nephrology (ASN) Task Force recommended calculation based on the Chronic Kidney Disease Epidemiology Collaboration (CKD-EPI) equation refit without adjustment for race.       Narrative:      GFR Normal >60  Chronic Kidney Disease <60  Kidney Failure <15      BNP [[id number]]  (Abnormal) Collected: 06/19/22 1337    Specimen: Blood Updated: 06/19/22 1357     proBNP 14,033.0 pg/mL     Narrative:      Among patients with dyspnea, NT-proBNP is highly sensitive for the detection of acute congestive heart failure. In addition NT-proBNP of <300 pg/ml effectively rules out acute congestive heart failure with 99% negative predictive value.    Results may be falsely decreased if patient taking Biotin.      CBC & Differential [[id number]]  (Abnormal) Collected: 06/19/22 1337    Specimen: Blood Updated: 06/19/22 1356    Narrative:      The following orders were created for panel order CBC & Differential.  Procedure                               Abnormality         Status                     ---------                               -----------         ------                     CBC Auto Differential[[id number]]        Abnormal            Final result                 Please view results for these tests on the individual orders.    CBC Auto Differential [[id number]]  (Abnormal) Collected: 06/19/22 1337    Specimen: Blood Updated: 06/19/22 1356     WBC 6.63 10*3/mm3      RBC 3.53 10*6/mm3      Hemoglobin 10.7 g/dL      Hematocrit 36.5 %      MCV  103.4 fL      MCH 30.3 pg      MCHC 29.3 g/dL      RDW 15.9 %      RDW-SD 61.0 fl      MPV 9.9 fL      Platelets 105 10*3/mm3      Neutrophil % 91.3 %      Lymphocyte % 5.3 %      Monocyte % 2.6 %      Eosinophil % 0.0 %      Basophil % 0.2 %      Immature Grans % 0.6 %      Neutrophils, Absolute 6.06 10*3/mm3      Lymphocytes, Absolute 0.35 10*3/mm3      Monocytes, Absolute 0.17 10*3/mm3      Eosinophils, Absolute 0.00 10*3/mm3      Basophils, Absolute 0.01 10*3/mm3      Immature Grans, Absolute 0.04 10*3/mm3      nRBC 0.0 /100 WBC         Imaging Results (Last 7 Days)     Procedure Component Value Units Date/Time    XR Chest PA & Lateral [[id number]] Collected: 06/22/22 0647     Updated: 06/22/22 1020    Narrative:      TWO VIEW CHEST    HISTORY: Follow-up congestive heart failure. Shortness of breath.  Acute and chronic respiratory failure with hypoxia and  hypercapnia.    Frontal and lateral films of the chest were obtained.    COMPARISON: June 13, 2022. Correlation CT May 30, 2022.    FINDINGS:    EKG leads.  Chronic obstructive pulmonary disease.  No acute infiltrate.  Apical pleural scarring, greater on the right.  Parenchymal scarring each upper lobe.  Old granulomatous disease.  Stable cardiomegaly.  The pulmonary vasculature is not increased.  Unchanged small left pleural effusion.  No pneumothorax.  No acute osseous abnormality.  Degenerative changes are present in the thoracic spine.      Impression:      CONCLUSION:  Chronic obstructive pulmonary disease.  No acute infiltrate.  Stable cardiomegaly.  The pulmonary vasculature is not increased.  Unchanged small left pleural effusion.    [id number]    Electronically signed by:  Albert Islas MD  6/22/2022 10:17 AM  CDT Workstation: [id number]    XR Chest 1 View [[id number]] Collected: 06/19/22 1338     Updated: 06/19/22 8828    Narrative:      EXAM: XR CHEST 1 VIEW    HISTORY: [facility] Triage Protocol    COMPARISON: 6/13/2022    TECHNIQUE:   Portable view of the  chest.    FINDINGS:   Stable cardiomegaly. Mild to moderate left and small right  pleural effusion, increased since prior, with coexisting  atelectasis versus infiltrate in the lung bases more on the left.  Bilateral apical pleural thickening with chronic changes.  The mediastinal contours are within normal limits. .  No evidence of mediastinal shift or pneumothorax.  Unremarkable visualized osseous structures.      Impression:      Mild to moderate left and small right pleural effusion, increased  since prior, with coexisting atelectasis versus infiltrate in the  lung bases more on the left.  Bilateral apical pleural thickening with chronic changes.            Electronically signed by:  Miah De La Rosa MD  6/19/2022 3:53 PM CDT  Workstation: [id number]    US Venous Doppler Lower Extremity Bilateral (duplex) [[id number]] Collected: 06/19/22 1345     Updated: 06/19/22 1516    Narrative:      EXAM: US LOWER EXTREMITY VEINS BILATERAL    HISTORY: leg swelling    COMPARISON STUDY:      TECHNIQUE:  Grayscale, duplex and color Doppler sonogram of the bilateral  lower extremities was obtained.     FINDINGS:    There is complete coaptation with graded compression at all  visualized levels from the common femoral vein to the calf veins.  There is augmentation of the venous waveform with calf  compression and respiratory variation seen at appropriate levels.   No filling defect is seen.   No Baker's cyst seen.     Unremarkable bilateral greater saphenous veins.      Impression:      IMPRESSION  1.  No evidence of deep venous thrombosis of the bilateral lower  extremities.    Electronically signed by:  Miah De La Rosa MD  6/19/2022 3:14 PM CDT  Workstation: [id number]            Chief Complaint on Day of Discharge:None     Hospital Course:  The patient was admitted and managed as follows:    Acute on chronic respiratory failure with hypoxia and hypercapnia secondary to acute on chronic systolic heart failure/COPD exacerbation: She was  "started on  noninvasive respiratory therapy, diuretics, bronchodilators, steroids with strict I's and O's, daily weights, salt and fluid restriction.  She did improve, became less symptomatic and was maintaining O2 saturation in the upper 90s on 2 L of supplemental oxygen (at baseline she uses 3 L of supplemental oxygen chronically).  Follow-up chest x-ray showed some improvement..   Echocardiogram done 6/2/2022 showed:  · Left ventricular ejection fraction appears to be 46 - 50%.  · Left ventricular diastolic function was normal.  · Moderate tricuspid valve regurgitation is present.  · Estimated right ventricular systolic pressure from tricuspid regurgitation is moderately elevated (45-55 mmHg).  · Moderate pulmonary hypertension is present.  · The following left ventricular wall segments are hypokinetic: mid inferoseptal     Hypernatremia (likely arising from overdiuresis): Resolved with D5W infusion. Mild diuretic induced azotemia has resolved.     Hyperkalemia: Patient was on potassium supplementation which was discontinued.  She did receive a dose of Lokelma and potassium was back to normal before discharge.      She benefited from PT and OT secondary to deconditioning and ambulatory home health was ordered on discharge.     Due to underlying history of chronic respiratory failure/O2 dependent COPD, chronic systolic heart failure and severe deconditioning patient requires HOB elevated greater than 30 degrees and requires frequent change in body position.  As a result, patient will benefit from a hospital which has been recommended and prescribed.       Condition on Discharge: Stable and improved.    Physical Exam on Discharge:  /88 (BP Location: Left arm, Patient Position: Lying)   Pulse 98   Temp 97 °F (36.1 °C) (Oral)   Resp 18   Ht 149.9 cm (59\")   Wt 44.6 kg (98 lb 4 oz)   SpO2 97%   BMI 19.84 kg/m²   Physical Exam  Vitals and nursing note reviewed.   Constitutional:       General: She is not " in acute distress.     Appearance: She is well-developed. She is cachectic. She is ill-appearing. She is not diaphoretic.      Comments: Patient is cachectic and severely malnourished.   HENT:      Head: Normocephalic and atraumatic.      Right Ear: External ear normal.      Left Ear: External ear normal.      Nose: Nose normal.   Eyes:      Extraocular Movements: Extraocular movements intact.      Conjunctiva/sclera: Conjunctivae normal.      Pupils: Pupils are equal, round, and reactive to light.   Neck:      Thyroid: No thyromegaly.      Vascular: No JVD.   Cardiovascular:      Rate and Rhythm: Normal rate and regular rhythm.      Heart sounds: Normal heart sounds. No murmur heard.    No friction rub. No gallop.   Pulmonary:      Effort: Pulmonary effort is normal. No respiratory distress.      Breath sounds: No stridor.  No wheezing or rales.  She has distant breath sounds bilaterally.    Chest:      Chest wall: No tenderness.   Abdominal:      General: Bowel sounds are normal. There is no distension.      Palpations: Abdomen is soft. There is no mass.      Tenderness: There is no abdominal tenderness. There is no right CVA tenderness, left CVA tenderness, guarding or rebound.      Hernia: No hernia is present.   Musculoskeletal:         General: No swelling, tenderness or deformity. Normal range of motion.      Cervical back: Normal range of motion and neck supple.      Right lower leg: No edema.      Left lower leg: No edema  Skin:     General: Skin is warm and dry.      Coloration: Skin is not jaundiced or pale.      Findings: No bruising, erythema, lesion or rash.   Neurological:      General: No focal deficit present.      Mental Status: She is alert and oriented to person, place, and time.      Cranial Nerves: No cranial nerve deficit.      Sensory: No sensory deficit.      Motor: No weakness.      Coordination: Coordination normal.      Gait: Gait normal.      Deep Tendon Reflexes: Reflexes are normal and  symmetric. Reflexes normal.   Psychiatric:         Mood and Affect: Mood is anxious.         Behavior: Behavior normal. Behavior is cooperative.         Thought Content: Thought content normal.         Judgment: Judgment normal  Discharge Disposition:  [place] [facility] [place]    Discharge Medications:     Discharge Medications      Changes to Medications      Instructions Start Date   furosemide 20 MG tablet  Commonly known as: Lasix  What changed:   · medication strength  · how much to take  · when to take this  · reasons to take this   20 mg, Oral, Daily      predniSONE 20 MG tablet  Commonly known as: DELTASONE  What changed:   · how much to take  · how to take this  · when to take this  · additional instructions   20 mg, Oral, Daily         Continue These Medications      Instructions Start Date   albuterol sulfate  (90 Base) MCG/ACT inhaler  Commonly known as: PROVENTIL HFA;VENTOLIN HFA;PROAIR HFA   INHALE 2 PUFFS BY MOUTH EVERY 6 HOURS AS NEEDED FOR WHEEZING      clotrimazole 1 % cream  Commonly known as: LOTRIMIN   Topical, 2 Times Daily      Combivent Respimat  MCG/ACT inhaler  Generic drug: ipratropium-albuterol   INHALE 1 PUFF BY MOUTH 4 TIMES DAILY      ipratropium-albuterol 0.5-2.5 mg/3 ml nebulizer  Commonly known as: DUO-NEB   Inhale the contents of 1 vial by nebulization 4 (Four) Times a Day for 30 days.      docusate sodium 100 MG capsule  Commonly known as: Colace   100 mg, Oral, 2 Times Daily      guaiFENesin-dextromethorphan 100-10 MG/5ML syrup  Commonly known as: ROBITUSSIN DM   10 mL, Oral, 4 Times Daily PRN      latanoprost 0.005 % ophthalmic solution  Commonly known as: XALATAN   INSTILL 1 DROP INTO EACH EYE AT BEDTIME      lisinopril 20 MG tablet  Commonly known as: PRINIVIL,ZESTRIL   Take 1 tablet by mouth once daily      metoprolol tartrate 25 MG tablet  Commonly known as: LOPRESSOR   Take one-half tablet (12.5 mg) by mouth Every 12 (Twelve) Hours.      sertraline 25 MG  tablet  Commonly known as: ZOLOFT   25 mg, Oral, Daily      vitamin D3 125 MCG (5000 UT) capsule capsule   1 capsule, Oral, Daily         Stop These Medications    benzonatate 100 MG capsule  Commonly known as: Tessalon Perles     cetirizine 10 MG tablet  Commonly known as: zyrTEC     loratadine 10 MG tablet  Commonly known as: Claritin            Discharge Diet: Heart healthy.    Activity at Discharge: As tolerated.    Discharge Care Plan/Instructions: Patient has been advised to take her medications as prescribed and to return to emergency room in the event of any worsening symptoms.    Follow-up Appointments:   Future Appointments   Date Time Provider Department Center   7/6/2022  9:45 AM Jan Salgado MD [facility] None   Follow-up primary care provider as outpatient.    Test Results Pending at Discharge:       Lino Cartwright MD  06/23/22  08:46 CDT    Time: 35 minutes.

## 2022-06-23 NOTE — DISCHARGE PLACEMENT REQUEST
"Khloe Singh (82 y.o. Female)             Date of Birth   1940    Social Security Number       Address   [address] [place], [place] [address]    Home Phone   [phone]    MRN   [id number]       Episcopalian   Mosque    Marital Status                               Admission Date   6/19/22    Admission Type   Emergency    Admitting Provider   Timothy Shields MD    Attending Provider   Timothy Shields MD    Department, Room/Bed   [address], 371/1       Discharge Date       [place] Dis[place] [facility] [place]                               Attending Provider: Timothy Shields MD    Allergies: Ativan [Lorazepam]    Isolation: None   Infection: None   Code Status: No CPR   Advance Care Planning Activity    Ht: 149.9 cm (59\")   Wt: 44.6 kg (98 lb 4 oz)    Admission Cmt: None   Principal Problem: None                Active Insurance as of 6/19/2022     Primary Coverage     Payor Plan Insurance Group Employer/Plan Group    MEDICARE MEDICARE A & B      Payor Plan Address Payor Plan Phone Number Payor Plan Fax Number Effective Dates    [address] [phone]  2/1/2001 - None Entered    formerly [facility] [address]       Subscriber Name Subscriber Birth Date Member ID       KHLOE SINGH 1940 [id number]           Secondary Coverage     Payor Plan Insurance Group Employer/Plan Group    [place]      Payor Plan Address Payor Plan Phone Number Payor Plan Fax Number Effective Dates    [address] [phone]  5/1/2018 - None Entered    [place] [address]       Subscriber Name Subscriber Birth Date Member ID       KHLOE SINGH 1940 [id number]                 Emergency Contacts      (Rel.) Home Phone Work Phone Mobile Phone    Eric Garrett (Other) [phone] -- --    DAVION DENTON (Relative) -- -- [phone]    "       [address] [address]  Phone:  [phone]  Fax:  [phone] Date: 2022      Ambulatory Referral to Home Health ([facility])     Patient:  Tamara Singh MRN:  [id number]   [address] [place] [id number] :  1940  SSN:    Phone: [phone] Sex:  F      INSURANCE PAYOR PLAN GROUP # SUBSCRIBER ID   Primary:  Secondary:    MEDICARE  [place] [id number]  [id number]      [id number]  [id number]      Referring Provider Information:  [place] [facility] Phone: [phone] Fax: [phone]       Referral Information:   # Visits:  999 Referral Type: Home Health [42]   Urgency:  Routine Referral Reason: Specialty Services Required   Start Date: 2022 End Date:  To be determined by Insurer   Diagnosis: Acute on chronic respiratory failure with hypoxia and hypercapnia (HCC) (J96.21,J96.22 [ICD-10-CM] 518.84,786.09,799.02 [ICD-9-CM])      Refer to Dept:   Refer to Provider:   Refer to Provider Phone:   Refer to Facility:       Face to Face Visit Date: 2022  Follow-up provider for Plan of Care? I treated the patient in an acute care facility and will not continue treatment after discharge.  Follow-up provider: SINDY BARRETT [[id number]]  Reason/Clinical Findings: Deconditioning  Describe mobility limitations that make leaving home difficult: seconditioning  Nursing/Therapeutic Services Requested: Skilled Nursing  Nursing/Therapeutic Services Requested: Physical Therapy  Nursing/Therapeutic Services Requested: Occupational Therapy  Skilled nursing orders: COPD management  Skilled nursing orders: Medication education  Skilled nursing orders: O2 instruction  Skilled nursing orders: CHF management  Skilled nursing orders: Neurovascular assessments  Skilled nursing orders: Cardiopulmonary assessments  PT orders: Therapeutic exercise  PT orders: Home safety assessment  Occupational orders: Energy  conservation  Occupational orders: Activities of daily living  Occupational orders: Home safety assessment  Frequency: 1 Week 1     This document serves as a request of services and does not constitute Insurance authorization or approval of services.  To determine eligibility, please contact the members Insurance carrier to verify and review coverage.     If you have medical questions regarding this request for services. Please contact [address] at [phone] during normal business hours.        Authorizing Provider:Lino Cartwright MD  Authorizing Provider's NPI: [id number]  Order Entered By: Lino Cartwright MD 6/23/2022  8:46 AM     Electronically signed by: Lino Cartwright MD 6/23/2022  8:46 AM         Emergency Contact Information     Name Relation Home Work Mobile    Eric Garrett [phone]      DAVION DENTON   [phone]          Insurance Information                MEDICARE/MEDICARE A & B Phone: [phone]    Subscriber: Tamara Singh Subscriber#: [id number]    Group#: -- Precert#: --        [place] Phone: [phone]    Subscriber: Tamara Singh Subscriber#: [id number]    Group#: -- Precert#: --

## 2022-06-23 NOTE — DISCHARGE PLACEMENT REQUEST
"Khloe Signh (82 y.o. Female)             Date of Birth   1940    Social Security Number       Address   [address] [place], [place] [address]    Home Phone   [phone]    MRN   [id number]       Moravian   Tenriism    Marital Status                               Admission Date   6/19/22    Admission Type   Emergency    Admitting Provider   Timothy Shields MD    Attending Provider   Timothy Shields MD    Department, Room/Bed   [address], 371/1       Discharge Date       Dis[place] Disposition   [place] [facility] [place]                               Attending Provider: Timothy Shields MD    Allergies: Ativan [Lorazepam]    Isolation: None   Infection: None   Code Status: No CPR   Advance Care Planning Activity    Ht: 149.9 cm (59\")   Wt: 44.6 kg (98 lb 4 oz)    Admission Cmt: None   Principal Problem: None                Active Insurance as of 6/19/2022     Primary Coverage     Payor Plan Insurance Group Employer/Plan Group    MEDICARE MEDICARE A & B      Payor Plan Address Payor Plan Phone Number Payor Plan Fax Number Effective Dates    [address] [phone]  2/1/2001 - None Entered    [facility] [id number]       Subscriber Name Subscriber Birth Date Member ID       KHLOE SINGH 1940 [id number]           Secondary Coverage     Payor Plan Insurance Group Employer/Plan Group    [place]      Payor Plan Address Payor Plan Phone Number Payor Plan Fax Number Effective Dates    [address] [phone]  5/1/2018 - None Entered    [place] [address]       Subscriber Name Subscriber Birth Date Member ID       KHLOE SINGH 1940 [id number]                 Emergency Contacts      (Rel.) Home Phone Work Phone Mobile Phone    Eric Garrett (Other) [phone] -- --    DAVION DENTON (Relative) -- -- [phone]    "         Emergency Contact Information     Name Relation Home Work Mobile    Eric Garrett [phone]      DAVION DENTON Relative   [phone]          Insurance Information                MEDICARE/MEDICARE A & B Phone: [phone]    Subscriber: Tamara Singh Subscriber#: [id number]    Group#: -- Precert#: --        [place] Phone: [phone]    Subscriber: Tamara Singh Subscriber#: [id number]    Group#: -- Precert#: --             History & Physical      Timothy Shields MD at 22 1604              [facility] HOSPITALIST HISTORY AND PHYSICAL    Patient Identification:  Name:  Tamara Singh  Age:  82 y.o.  Sex:  female  :  1940  MRN:  [id number]   Visit Number:  [id number]  Primary Care Physician:  Jean Pierre Scanlon MD     Chief complaint: Worsening shortness of breath and lower extremity swelling.    History of presenting illness:  82 y.o. female with known history of chronic respiratory failure with hypoxia and hypercapnia on home oxygen, COPD as well as chronic systolic congestive heart failure presents to the emergency room due to worsening shortness of breath associated with lower extremity swelling.  Patient was admitted on 2022 and was treated for pneumonia and discharged on  2022.  After her discharge from the hospital she remained short of breath and was seen by her PCP on 2022.  During the PCP visit she complained of shortness of breath wheezing and ankle swelling.  She  complained of constipation.  She was prescribed prednisone .  She however has remained short of breath and noticed worsening swelling of her lower extremities especially in the calf area despite being on furosemide.  She denies any chest pain but complains of subcostal pain bilaterally and feels full in her abdomen.  She admits to orthopnea.Her appetite has been good and according to the family  she has been sleeping better than previously. Despite being on furosemide her urine output according to the family has been less.  Ambulating to the restroom which is a  very short distance resorcin worsening shortness of breath. Chest x-ray shows bilateral pleural effusion she still has infiltrates both lower lobes but worse on the left.  This is not new..  Her pro BNP which was above 5000 and her last admission is now 14,000.    ---------------------------------------------------------------------------------------------------------------------   Review of Systems   Constitutional: Negative.    HENT: Negative.    Respiratory: Positive for cough, shortness of breath and wheezing.    Cardiovascular: Positive for leg swelling.   Gastrointestinal: Negative.    Genitourinary: Negative.    Musculoskeletal: Negative.    Skin: Negative.    Neurological: Negative.    Psychiatric/Behavioral: Negative.       ---------------------------------------------------------------------------------------------------------------------   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 Biopsy taken. Normal stomach. Normal duodenum.)   2014 GURPREET BHAT    • ENDOSCOPY  2014    EGD w/ tube 58647 (Esophagitis seen. Biopsy taken. No evidence of esophageal ca. Gastritis in stomach. Biopsy taken. Normal duodenum.)   • ENDOSCOPY  10/02/2013    EGD w/ tube 37424 (Normal esopahgus, stoamch, & duodenum. No evidence of tumor. Biopsy taken.)   • ENDOSCOPY  2013    EGD w/ tube 56371 (Tumor in distal 3rd of esophagus. Biopsy taken. Lesion runs from 28 cms to 34 cms. Normal stomach & duodenum.)   • INJECTION OF MEDICATION  2016    Kenalog (5)      • OTHER SURGICAL HISTORY  12/10/2014    Drain/Inject Major Joint  (Shoulder joint pain)    • OTHER SURGICAL HISTORY      OCT DISC NFL 28777 (Primary open angle glaucoma (2): 2016, 2015   • OTHER SURGICAL HISTORY  2016    OPTIC NERVE HEAD EVAL PERFORMED  (Primary open angle glaucoma)      Family History   Problem Relation Age of Onset   • Diabetes Mother    • Cancer Father    • Cancer Brother    • Diabetes Brother      Social History     Socioeconomic History   • Marital status:    Tobacco Use   • Smoking status: Former Smoker     Packs/day: 1.00     Years: 59.00     Pack years: 59.00     Types: Cigarettes     Start date: 1955     Quit date: 2014     Years since quittin.4   • Smokeless tobacco: Never Used   Vaping Use   • Vaping Use: Never used   Substance and Sexual Activity   • Alcohol use: No   • Drug use: No   • Sexual activity: Defer     ---------------------------------------------------------------------------------------------------------------------   Allergies:  Ativan  [lorazepam]  ---------------------------------------------------------------------------------------------------------------------   Prior to Admission Medications     Prescriptions Last Dose Informant Patient Reported? Taking?    albuterol sulfate  (90 Base) MCG/ACT inhaler   No No    INHALE 2 PUFFS BY MOUTH EVERY 6 HOURS AS NEEDED FOR WHEEZING    benzonatate (Tessalon Perles) 100 MG capsule   No No    Take 1 capsule by mouth 3 (Three) Times a Day As Needed for Cough.    Cholecalciferol 78572 UNITS capsule   Yes No    Take 1 capsule by mouth daily.    clotrimazole (LOTRIMIN) 1 % cream   No No    Apply  topically to the appropriate area as directed 2 (Two) Times a Day.    Combivent Respimat  MCG/ACT inhaler   No No    INHALE 1 PUFF BY MOUTH 4 TIMES DAILY    docusate sodium (Colace) 100 MG capsule   No No    Take 1 capsule by mouth 2 (Two) Times a Day.    furosemide (LASIX) 40 MG tablet   No No    Take 1 tablet by mouth Daily As Needed (SWELLING).    guaiFENesin-dextromethorphan (ROBITUSSIN DM) 100-10 MG/5ML syrup   No No    Take 10 mL by mouth 4 (Four) Times a Day As Needed for Cough.    ipratropium-albuterol (DUO-NEB) 0.5-2.5 mg/3 ml nebulizer   No No    Inhale the contents of 1 vial by nebulization 4 (Four) Times a Day for 30 days.    latanoprost (XALATAN) 0.005 % ophthalmic solution   Yes No    INSTILL 1 DROP INTO EACH EYE AT BEDTIME    lisinopril (PRINIVIL,ZESTRIL) 20 MG tablet   No No    Take 1 tablet by mouth once daily    loratadine (Claritin) 10 MG tablet   No No    Take 1 tablet by mouth Daily.    metoprolol tartrate (LOPRESSOR) 25 MG tablet   No No    Take one-half tablet (12.5 mg) by mouth Every 12 (Twelve) Hours.    predniSONE (DELTASONE) 20 MG tablet   No No    Take 2 tablets twice daily for 7 days. Take 1 tablet twice daily for 7 days. Take 1 tablet daily for 7 days.    sertraline (ZOLOFT) 25 MG tablet   No No    Take 1 tablet by mouth Daily.         ---------------------------------------------------------------------------------------------------------------------   Vital Signs:  Temp:  [97.4 °F (36.3 °C)] 97.4 °F (36.3 °C)  Heart Rate:  [80-98] 88  Resp:  [22] 22  BP: (141-175)/(68-78) 156/68      06/19/22  1237   Weight: 43.1 kg (95 lb)     Body mass index is 19.19 kg/m².  ---------------------------------------------------------------------------------------------------------------------   Physical Exam:  Constitutional: Chronic ill looking elderly woman but not in acute distress.  Mild respiratory distress.      HENT:  Head: Normocephalic and atraumatic.  Mouth:  Moist mucous membranes.    Eyes:  Conjunctivae and EOM are normal.  Pupils are equal, round, and reactive to light.  No scleral icterus.  Neck:  Neck supple.  No JVD present.    Cardiovascular:  Normal rate, regular rhythm and normal heart sounds with no murmur.  Pulmonary/Chest: Emphysematous slight kyphosis.  Mild respiratory distress, few expiratory wheezes, bibasilar crackles, with decreased breath sounds and diminished air movement.  Abdominal:  Soft.  Bowel sounds are normal.  No distension and no tenderness.   Musculoskeletal: 2+ bilateral lower extremity edema, calf tenderness, and no deformity.  No red or swollen joints anywhere.    Neurological:  Alert and oriented to person, place, and time.  No cranial nerve deficit.  No tongue deviation.  No facial droop.  No slurred speech.   Skin:  Skin is warm and dry.  No rash noted.  No pallor.  Ecchymosis on the left lower extremity above the medial malleoli  Psychiatric:  Normal mood and affect.  Behavior is normal.  Judgment and thought content normal.   Peripheral vascular:  No edema and strong pulses on all 4 extremities.  Genitourinary: Deferred  ---------------------------------------------------------------------------------------------------------------------  EKG:      ---------------------------------------------------------------------------------------------------------------------   Results from last 7 days   Lab Units 06/19/22  1531 06/19/22  1337   LACTATE mmol/L 1.5  --    WBC 10*3/mm3  --  6.63   HEMOGLOBIN g/dL  --  10.7*   HEMATOCRIT %  --  36.5   MCV fL  --  103.4*   MCHC g/dL  --  29.3*   PLATELETS 10*3/mm3  --  105*     Results from last 7 days   Lab Units 06/19/22  1450   PH, ARTERIAL pH units 7.318*   PO2 ART mm Hg 98.9   PCO2, ARTERIAL mm Hg 73.7*   HCO3 ART mmol/L 37.8*     Results from last 7 days   Lab Units 06/19/22  1337 06/13/22  0945   SODIUM mmol/L 145 145   POTASSIUM mmol/L 4.2 4.8   CHLORIDE mmol/L 103 107   CO2 mmol/L 33.0* 26.9   BUN mg/dL 31* 21   CREATININE mg/dL 0.93 0.98   CALCIUM mg/dL 8.7 8.7   GLUCOSE mg/dL 126* 88   ALBUMIN g/dL 3.40*  --    BILIRUBIN mg/dL 0.2  --    ALK PHOS U/L 149*  --    AST (SGOT) U/L 44*  --    ALT (SGPT) U/L 56*  --    Estimated Creatinine Clearance: 32.3 mL/min (by C-G formula based on SCr of 0.93 mg/dL).  No results found for: AMMONIA  Results from last 7 days   Lab Units 06/19/22  1337   TROPONIN T ng/mL 0.010     Results from last 7 days   Lab Units 06/19/22  1337   PROBNP pg/mL 14,033.0*     No results found for: HGBA1C  Lab Results   Component Value Date    TSH 3.790 06/25/2019     No results found for: PREGTESTUR, PREGSERUM, HCG, HCGQUANT  Pain Management Panel    There is no flowsheet data to display.       No results found for: BLOODCX  No results found for: URINECX  No results found for: WOUNDCX  No results found for: STOOLCX        I have personally looked at the labs and they are stated above.  ---------------------------------------------------------------------------------------------------------------------  Imaging Results (Last 7 Days)     Procedure Component Value Units Date/Time    XR Chest 1 View [[id number]] Collected: 06/19/22 1338     Updated: 06/19/22 3099    Narrative:      EXAM: XR CHEST 1  VIEW    HISTORY: SOA Triage Protocol    COMPARISON: 6/13/2022    TECHNIQUE:   Portable view of the chest.    FINDINGS:   Stable cardiomegaly. Mild to moderate left and small right  pleural effusion, increased since prior, with coexisting  atelectasis versus infiltrate in the lung bases more on the left.  Bilateral apical pleural thickening with chronic changes.  The mediastinal contours are within normal limits. .  No evidence of mediastinal shift or pneumothorax.  Unremarkable visualized osseous structures.      Impression:      Mild to moderate left and small right pleural effusion, increased  since prior, with coexisting atelectasis versus infiltrate in the  lung bases more on the left.  Bilateral apical pleural thickening with chronic changes.            Electronically signed by:  Miah De La Rosa MD  6/19/2022 3:53 PM CDT  Workstation: [id number]    US Venous Doppler Lower Extremity Bilateral (duplex) [[id number]] Collected: 06/19/22 1345     Updated: 06/19/22 1516    Narrative:      EXAM: US LOWER EXTREMITY VEINS BILATERAL    HISTORY: leg swelling    COMPARISON STUDY:      TECHNIQUE:  Grayscale, duplex and color Doppler sonogram of the bilateral  lower extremities was obtained.     FINDINGS:    There is complete coaptation with graded compression at all  visualized levels from the common femoral vein to the calf veins.  There is augmentation of the venous waveform with calf  compression and respiratory variation seen at appropriate levels.   No filling defect is seen.   No Baker's cyst seen.     Unremarkable bilateral greater saphenous veins.      Impression:      IMPRESSION  1.  No evidence of deep venous thrombosis of the bilateral lower  extremities.    Electronically signed by:  Miah De La Rosa MD  6/19/2022 3:14 PM CDT  Workstation: [id number]        Results for orders placed during the hospital encounter of 05/30/22    Adult Transthoracic Echo Complete W/ Cont if Necessary Per Protocol    Interpretation Summary  ·  Left ventricular ejection fraction appears to be 46 - 50%.  · Left ventricular diastolic function was normal.  · Moderate tricuspid valve regurgitation is present.  · Estimated right ventricular systolic pressure from tricuspid regurgitation is moderately elevated (45-55 mmHg).  · Moderate pulmonary hypertension is present.  · The following left ventricular wall segments are hypokinetic: mid inferoseptal.        I have personally reviewed the radiology images and read the final radiology report.  ---------------------------------------------------------------------------------------------------------------------  Assessment and Plan:  Acute on chronic systolic congestive heart failure.  Place in observation status.    Acute heart failure MPP initiated.  Diet: Cardiac diet  She has received IV Lasix 80 mg x 1 in the ED.  Will continue with IV Lasix 40 mg twice a day.  Will monitor renal function and potassium level closely  Will replace potassium preemptively  Strict I's and O's  Daily weights  Resume her lisinopril and metoprolol.    Chronic respiratory failure with hypoxia and hypercapnia.  Continue on DuoNeb  Titrate oxygen to keep saturation between 88 to 92%.    COPD with bronchitis.  Mucinex p.o. 1200 mg twice a day.  Prednisone 40 mg p.o. daily  DuoNeb    Hypertension.  Continue lisinopril and metoprolol.  Adjust dosage for better blood pressure control.    Chronic severe malnutrition-due to chronic illness.  Encourage p.o. intake  Encourage oral supplements.    Thrombocytopenia.  This is new.  We will monitor closely.  Will avoid anticoagulation.    Macrocytosis.  Review of her record showed that her folic acid is normal.  We will check B12 level.    DVT- due to thrombocytopenia which is new we will apply SCDs.    Disposition:  Observation status.  From previous documentation she is DNR/DNI.  Plan of care was discussed with the patient and the family member at the bedside.  Anticipate patient will be in  hospital 1-2 midnights.    Timothy Shields MD  06/19/22  16:04 CDT     Dragon disclaimer:  Part of this note may be an electronic transcription/translation of spoken language to printed text using the Dragon Dictation System.                        Electronically signed by Timothy Shields MD at 06/19/22 1828          Physician Progress Notes (most recent note)      Lino Cartwright MD at 06/22/22 1007              [facility] Medicine Services  INPATIENT PROGRESS NOTE    Length of Stay: 0  Date of Admission: 6/19/2022  Primary Care Physician: Jean Pierre Scanlon MD    Subjective   Chief Complaint: Shortness of air.    HPI: Patient is seen for follow-up today 6/22/2022.  She is doing better at this morning, less short of air, less anxious, deconditioned and on her baseline supplemental oxygen of 3 L nasal prong with saturation in the upper 90s.      Review of Systems   Constitutional: Positive for activity change and fatigue. Negative for appetite change, chills, diaphoresis and fever.   HENT: Negative for trouble swallowing and voice change.    Eyes: Negative for photophobia and visual disturbance.   Respiratory: Positive for cough and shortness of breath. Negative for choking, chest tightness, wheezing and stridor.    Cardiovascular: Negative for chest pain, palpitations and leg swelling.   Gastrointestinal: Negative for abdominal distention, abdominal pain, blood in stool, constipation, diarrhea, nausea and vomiting.   Endocrine: Negative for cold intolerance, heat intolerance, polydipsia, polyphagia and polyuria.   Genitourinary: Negative for decreased urine volume, difficulty urinating, dysuria, enuresis, flank pain, frequency, hematuria and urgency.   Musculoskeletal: Negative for arthralgias, gait problem, myalgias, neck pain and neck stiffness.   Skin: Negative for pallor, rash and wound.   Neurological: Negative for dizziness, tremors,  seizures, syncope, facial asymmetry, speech difficulty, weakness, light-headedness, numbness and headaches.   Hematological: Does not bruise/bleed easily.   Psychiatric/Behavioral: Negative for agitation, behavioral problems and confusion. The patient is nervous/anxious.        Objective    Temp:  [97.6 °F (36.4 °C)-98 °F (36.7 °C)] 98 °F (36.7 °C)  Heart Rate:  [] 101  Resp:  [16-20] 20  BP: (125-162)/(59-84) 162/72    Physical Exam  Vitals and nursing note reviewed.   Constitutional:       General: She is not in acute distress.     Appearance: She is well-developed. She is cachectic. She is ill-appearing. She is not diaphoretic.      Comments: Patient is cachectic and severely malnourished.   HENT:      Head: Normocephalic and atraumatic.      Right Ear: External ear normal.      Left Ear: External ear normal.      Nose: Nose normal.   Eyes:      Extraocular Movements: Extraocular movements intact.      Conjunctiva/sclera: Conjunctivae normal.      Pupils: Pupils are equal, round, and reactive to light.   Neck:      Thyroid: No thyromegaly.      Vascular: No JVD.   Cardiovascular:      Rate and Rhythm: Normal rate and regular rhythm.      Heart sounds: Normal heart sounds. No murmur heard.    No friction rub. No gallop.   Pulmonary:      Effort: Pulmonary effort is normal. No respiratory distress.      Breath sounds: No stridor. Decreased breath sounds and rhonchi present. No wheezing or rales.   Chest:      Chest wall: No tenderness.   Abdominal:      General: Bowel sounds are normal. There is no distension.      Palpations: Abdomen is soft. There is no mass.      Tenderness: There is no abdominal tenderness. There is no right CVA tenderness, left CVA tenderness, guarding or rebound.      Hernia: No hernia is present.   Musculoskeletal:         General: No swelling, tenderness or deformity. Normal range of motion.      Cervical back: Normal range of motion and neck supple.      Right lower leg: No edema.       Left lower leg: No edema.   Skin:     General: Skin is warm and dry.      Coloration: Skin is not jaundiced or pale.      Findings: No bruising, erythema, lesion or rash.   Neurological:      General: No focal deficit present.      Mental Status: She is alert and oriented to person, place, and time.      Cranial Nerves: No cranial nerve deficit.      Sensory: No sensory deficit.      Motor: No weakness.      Coordination: Coordination normal.      Gait: Gait normal.      Deep Tendon Reflexes: Reflexes are normal and symmetric. Reflexes normal.   Psychiatric:         Mood and Affect: Mood is anxious.         Behavior: Behavior normal. Behavior is cooperative.         Thought Content: Thought content normal.         Judgment: Judgment normal.           Medication Review:    Current Facility-Administered Medications:   •  budesonide-formoterol (SYMBICORT) 160-4.5 MCG/ACT inhaler 2 puff, 2 puff, Inhalation, BID - RT, Lino Cartwright MD, 2 puff at 06/22/22 0729  •  docusate sodium (COLACE) capsule 100 mg, 100 mg, Oral, BID, Timothy Shields MD, 100 mg at 06/21/22 2041  •  furosemide (LASIX) injection 20 mg, 20 mg, Intravenous, Daily, Lino Cartwright MD, 20 mg at 06/22/22 0833  •  guaiFENesin (MUCINEX) 12 hr tablet 1,200 mg, 1,200 mg, Oral, Q12H, Timothy Shields MD, 1,200 mg at 06/22/22 0834  •  hydrALAZINE (APRESOLINE) injection 10 mg, 10 mg, Intravenous, Q6H PRN, Lino Cartwright MD  •  ipratropium-albuterol (DUO-NEB) nebulizer solution 3 mL, 3 mL, Nebulization, Q4H PRN, Behroozi, Saeid, MD, 3 mL at 06/22/22 0729  •  latanoprost (XALATAN) 0.005 % ophthalmic solution 1 drop, 1 drop, Both Eyes, Nightly, Timothy Shields MD, 1 drop at 06/21/22 2042  •  lisinopril (PRINIVIL,ZESTRIL) tablet 20 mg, 20 mg, Oral, Daily, Timothy Shields MD, 20 mg at 06/22/22 0860  •  methylPREDNISolone sodium succinate (SOLU-Medrol) injection 40 mg, 40 mg,  Intravenous, Q8H, Lino Cartwright MD, 40 mg at 06/22/22 0524  •  metoprolol tartrate (LOPRESSOR) half tablet 12.5 mg, 12.5 mg, Oral, Q12H, Timothy Shields MD, 12.5 mg at 06/22/22 0834  •  morphine injection 1 mg, 1 mg, Intravenous, Q6H PRN, Lino Cartwright MD, 1 mg at 06/21/22 1324  •  sertraline (ZOLOFT) tablet 25 mg, 25 mg, Oral, Daily, Timothy Shields MD, 25 mg at 06/22/22 0834  •  sodium chloride 0.9 % flush 10 mL, 10 mL, Intravenous, PRN, Timothy Shields MD  •  sodium chloride 0.9 % flush 10 mL, 10 mL, Intravenous, Q12H, Timothy Shields MD, 10 mL at 06/22/22 0834  •  sodium chloride 0.9 % flush 10 mL, 10 mL, Intravenous, PRN, Timothy Shields MD  •  sodium zirconium cyclosilicate (LOKELMA) pack 5 g, 5 g, Oral, Once, Lino Cartwright MD    Results Review:  I have reviewed the labs, radiology results, and diagnostic studies.    Laboratory Data:   Results from last 7 days   Lab Units 06/22/22  0915 06/22/22  0746 06/21/22  0544 06/20/22  0533 06/19/22  1337   SODIUM mmol/L  --  143 140 154* 145   POTASSIUM mmol/L 5.9* 6.5* 3.7 3.8 4.2   CHLORIDE mmol/L  --  99 97* 103 103   CO2 mmol/L  --  38.0* 36.0* 34.0* 33.0*   BUN mg/dL  --  29* 30* 34* 31*   CREATININE mg/dL  --  0.90 0.96 1.07* 0.93   GLUCOSE mg/dL  --  125* 95 80 126*   CALCIUM mg/dL  --  9.0 8.1* 8.4* 8.7   BILIRUBIN mg/dL  --   --   --   --  0.2   ALK PHOS U/L  --   --   --   --  149*   ALT (SGPT) U/L  --   --   --   --  56*   AST (SGOT) U/L  --   --   --   --  44*   ANION GAP mmol/L  --  6.0 7.0 17.0* 9.0     Estimated Creatinine Clearance: 33.6 mL/min (by C-G formula based on SCr of 0.9 mg/dL).  Results from last 7 days   Lab Units 06/22/22  0613   MAGNESIUM mg/dL 2.2         Results from last 7 days   Lab Units 06/22/22  0613 06/21/22  0544 06/20/22  0702 06/19/22  1337   WBC 10*3/mm3 10.87* 5.35 6.92 6.63   HEMOGLOBIN g/dL 10.5* 10.0* 10.5* 10.7*    HEMATOCRIT % 34.6 31.0* 33.4* 36.5   PLATELETS 10*3/mm3 138* 129* 135* 105*           Culture Data:   No results found for: BLOODCX  No results found for: URINECX  No results found for: RESPCX  No results found for: WOUNDCX  No results found for: STOOLCX  No components found for: BODYFLD    Radiology Data:   Imaging Results (Last 24 Hours)     Procedure Component Value Units Date/Time    XR Chest PA & Lateral [[id number]] Resulted: 06/22/22 0652     Updated: 06/22/22 0653          I have reviewed the patient's current medications.     Assessment/Plan     Hospital Problem List:  Active Problems:    SOB (shortness of breath)    Acute on chronic respiratory failure with hypoxia and hypercapnia secondary to acute on chronic systolic heart failure/COPD exacerbation: Slowly improving.  Continue noninvasive respiratory therapy, diuretics, bronchodilators, steroids with strict I's and O's, daily weights, salt and fluid restriction.  Result of repeat chest x-ray for this a.m. is pending.   Echocardiogram done 6/2/2022 showed:  · Left ventricular ejection fraction appears to be 46 - 50%.  · Left ventricular diastolic function was normal.  · Moderate tricuspid valve regurgitation is present.  · Estimated right ventricular systolic pressure from tricuspid regurgitation is moderately elevated (45-55 mmHg).  · Moderate pulmonary hypertension is present.  · The following left ventricular wall segments are hypokinetic: mid inferoseptal    Hypernatremia (likely arising from overdiuresis): Resolved. Mild diuretic induced azotemia has resolved.    Hyperkalemia: Patient is currently receiving daily potassium repletion.  Discontinue daily potassium supplementation and give a dose of Lokelma.  Repeat BMP later today.    Severe malnutrition: Dietitian is following.    Hypertension: Stable.  Continue lisinopril and metoprolol with adjustments as needed to achieve optimal blood pressure control.    Anxiety/depressive disorder: Continue  Zoloft.    Deconditioning: Continue PT and OT.    Continue GI and DVT prophylaxis.    Update was given to patient's daughter who was at the bedside.    CODE STATUS is 'no intubation and no CPR'.    Discharge Planning: In progress.    I confirmed that the patient's Advance Care Plan is present, code status is documented, or surrogate decision maker is listed in the patient's medical record.     I have utilized all available immediate resources to obtain, update, or review the patient's current medications      Lino Cartwright MD   06/22/22   10:07 CDT          Electronically signed by Lino Cartwright MD at 06/22/22 7737

## 2022-06-23 NOTE — PLAN OF CARE
Goal Outcome Evaluation:  Plan of Care Reviewed With: patient        Progress: improving  Outcome Evaluation: pt resting well. vss. no signs of distress at this time.

## 2022-06-23 NOTE — DISCHARGE PLACEMENT REQUEST
"Khloe Singh (82 y.o. Female)             Date of Birth   1940    Social Security Number       Address   [address] [place], [place] [address]    Home Phone   [phone]    MRN   [id number]       Episcopalian   Nondenominational    Marital Status                               Admission Date   6/19/22    Admission Type   Emergency    Admitting Provider   Timothy Shields MD    Attending Provider   Timothy Shields MD    Department, Room/Bed   [address], 371/1       Discharge Date       Discharge Disposition   Home-Health Care [facility]    Discharge Destination                               Attending Provider: Timothy Shields MD    Allergies: Ativan [Lorazepam]    Isolation: None   Infection: None   Code Status: No CPR   Advance Care Planning Activity    Ht: 149.9 cm (59\")   Wt: 44.6 kg (98 lb 4 oz)    Admission Cmt: None   Principal Problem: None                Active Insurance as of 6/19/2022     Primary Coverage     Payor Plan Insurance Group Employer/Plan Group    MEDICARE MEDICARE A & B      Payor Plan Address Payor Plan Phone Number Payor Plan Fax Number Effective Dates    [address] [phone]  2/1/2001 - None Entered    [facility] [id number]       Subscriber Name Subscriber Birth Date Member ID       KHLOE SINGH 1940 [id number]           Secondary Coverage     Payor Plan Insurance Group Employer/Plan Group    [place]      Payor Plan Address Payor Plan Phone Number Payor Plan Fax Number Effective Dates    [address] [phone]  5/1/2018 - None Entered    [place] [address]       Subscriber Name Subscriber Birth Date Member ID       KHLOE SINGH 1940 [id number]                 Emergency Contacts      (Rel.) Home Phone Work Phone Mobile Phone    Eric Garrett (Other) [phone] -- --    DAVION DENTON (Relative) -- -- [phone]    "            Discharge Summary      Lino Cartwright MD at 06/23/22 0846              [facility]  DISCHARGE SUMMARY       Date of Admission: 6/19/2022  Date of Discharge:  6/23/2022  Primary Care Physician: Jean Pierre Scanlon MD    Presenting Problem/History of Present Illness:  SOB (shortness of breath) [R06.02]  Acute on chronic respiratory failure with hypoxia and hypercapnia (HCC) [J96.21, J96.22]       Final Discharge Diagnoses:  Active Hospital Problems    Diagnosis    • SOB (shortness of breath)        Consults:   Consults     Date and Time Order Name Status Description    6/2/2022  1:54 PM Inpatient Cardiology Consult Completed           Procedures Performed: None.                Pertinent Test Results:   Lab Results (last 7 days)     Procedure Component Value Units Date/Time    Basic Metabolic Panel [[id number]]  (Abnormal) Collected: 06/23/22 0534    Specimen: Blood Updated: 06/23/22 0628     Glucose 140 mg/dL      BUN 37 mg/dL      Creatinine 0.86 mg/dL      Sodium 142 mmol/L      Potassium 4.9 mmol/L      Chloride 96 mmol/L      CO2 42.0 mmol/L      Calcium 8.9 mg/dL      BUN/Creatinine Ratio 43.0     Anion Gap 4.0 mmol/L      eGFR 68.0 mL/min/1.73      Comment: National Kidney Foundation and American Society of Nephrology (ASN) Task Force recommended calculation based on the Chronic Kidney Disease Epidemiology Collaboration (CKD-EPI) equation refit without adjustment for race.       Narrative:      GFR Normal >60  Chronic Kidney Disease <60  Kidney Failure <15      CBC & Differential [[id number]]  (Abnormal) Collected: 06/23/22 0534    Specimen: Blood Updated: 06/23/22 0627    Narrative:      The following orders were created for panel order CBC & Differential.  Procedure                               Abnormality         Status                     ---------                               -----------         ------                     CBC Auto  Differential[[id number]]        Abnormal            Final result               Scan Slide[[id number]]                                                                    Please view results for these tests on the individual orders.    CBC Auto Differential [[id number]]  (Abnormal) Collected: 06/23/22 0534    Specimen: Blood Updated: 06/23/22 0627     WBC 6.24 10*3/mm3      RBC 3.09 10*6/mm3      Hemoglobin 9.6 g/dL      Hematocrit 30.5 %      MCV 98.7 fL      MCH 31.1 pg      MCHC 31.5 g/dL      RDW 15.4 %      RDW-SD 55.8 fl      MPV 9.9 fL      Platelets 145 10*3/mm3      Neutrophil % 91.9 %      Lymphocyte % 5.8 %      Monocyte % 1.8 %      Eosinophil % 0.0 %      Basophil % 0.0 %      Immature Grans % 0.5 %      Neutrophils, Absolute 5.74 10*3/mm3      Lymphocytes, Absolute 0.36 10*3/mm3      Monocytes, Absolute 0.11 10*3/mm3      Eosinophils, Absolute 0.00 10*3/mm3      Basophils, Absolute 0.00 10*3/mm3      Immature Grans, Absolute 0.03 10*3/mm3      nRBC 0.0 /100 WBC     Basic Metabolic Panel [[id number]]  (Abnormal) Collected: 06/22/22 1811    Specimen: Blood Updated: 06/22/22 1929     Glucose 225 mg/dL      BUN 36 mg/dL      Creatinine 0.89 mg/dL      Sodium 139 mmol/L      Potassium 5.0 mmol/L      Chloride 93 mmol/L      CO2 33.0 mmol/L      Calcium 8.9 mg/dL      BUN/Creatinine Ratio 40.4     Anion Gap 13.0 mmol/L      eGFR 65.2 mL/min/1.73      Comment: National Kidney Foundation and American Society of Nephrology (ASN) Task Force recommended calculation based on the Chronic Kidney Disease Epidemiology Collaboration (CKD-EPI) equation refit without adjustment for race.       Narrative:      GFR Normal >60  Chronic Kidney Disease <60  Kidney Failure <15      Basic Metabolic Panel [[id number]]  (Abnormal) Collected: 06/22/22 0915    Specimen: Blood Updated: 06/22/22 1027     Glucose 139 mg/dL      BUN 29 mg/dL      Creatinine 0.85 mg/dL      Sodium 141 mmol/L      Potassium 6.0 mmol/L      Chloride 97 mmol/L       CO2 34.0 mmol/L      Calcium 8.8 mg/dL      BUN/Creatinine Ratio 34.1     Anion Gap 10.0 mmol/L      eGFR 68.9 mL/min/1.73      Comment: National Kidney Foundation and American Society of Nephrology (ASN) Task Force recommended calculation based on the Chronic Kidney Disease Epidemiology Collaboration (CKD-EPI) equation refit without adjustment for race.       Narrative:      GFR Normal >60  Chronic Kidney Disease <60  Kidney Failure <15      Potassium [[id number]]  (Abnormal) Collected: 06/22/22 0915    Specimen: Blood Updated: 06/22/22 0944     Potassium 5.9 mmol/L     Basic Metabolic Panel [[id number]]  (Abnormal) Collected: 06/22/22 0746    Specimen: Blood Updated: 06/22/22 0855     Glucose 125 mg/dL      BUN 29 mg/dL      Creatinine 0.90 mg/dL      Sodium 143 mmol/L      Potassium 6.5 mmol/L      Chloride 99 mmol/L      CO2 38.0 mmol/L      Calcium 9.0 mg/dL      BUN/Creatinine Ratio 32.2     Anion Gap 6.0 mmol/L      eGFR 64.4 mL/min/1.73      Comment: National Kidney Foundation and American Society of Nephrology (ASN) Task Force recommended calculation based on the Chronic Kidney Disease Epidemiology Collaboration (CKD-EPI) equation refit without adjustment for race.       Narrative:      GFR Normal >60  Chronic Kidney Disease <60  Kidney Failure <15      Magnesium [[id number]]  (Normal) Collected: 06/22/22 0613    Specimen: Blood Updated: 06/22/22 0650     Magnesium 2.2 mg/dL     CBC & Differential [[id number]]  (Abnormal) Collected: 06/22/22 0613    Specimen: Blood Updated: 06/22/22 0640    Narrative:      The following orders were created for panel order CBC & Differential.  Procedure                               Abnormality         Status                     ---------                               -----------         ------                     CBC Auto Differential[[id number]]        Abnormal            Final result                 Please view results for these tests on the individual orders.    CBC Auto  Differential [[id number]]  (Abnormal) Collected: 06/22/22 0613    Specimen: Blood Updated: 06/22/22 0640     WBC 10.87 10*3/mm3      RBC 3.45 10*6/mm3      Hemoglobin 10.5 g/dL      Hematocrit 34.6 %      .3 fL      MCH 30.4 pg      MCHC 30.3 g/dL      RDW 15.3 %      RDW-SD 56.7 fl      MPV 9.7 fL      Platelets 138 10*3/mm3      Neutrophil % 87.7 %      Lymphocyte % 5.8 %      Monocyte % 5.8 %      Eosinophil % 0.0 %      Basophil % 0.1 %      Immature Grans % 0.6 %      Neutrophils, Absolute 9.53 10*3/mm3      Lymphocytes, Absolute 0.63 10*3/mm3      Monocytes, Absolute 0.63 10*3/mm3      Eosinophils, Absolute 0.00 10*3/mm3      Basophils, Absolute 0.01 10*3/mm3      Immature Grans, Absolute 0.07 10*3/mm3      nRBC 0.0 /100 WBC     Basic Metabolic Panel [[id number]]  (Abnormal) Collected: 06/21/22 0544    Specimen: Blood Updated: 06/21/22 0628     Glucose 95 mg/dL      BUN 30 mg/dL      Creatinine 0.96 mg/dL      Sodium 140 mmol/L      Potassium 3.7 mmol/L      Chloride 97 mmol/L      CO2 36.0 mmol/L      Calcium 8.1 mg/dL      BUN/Creatinine Ratio 31.3     Anion Gap 7.0 mmol/L      eGFR 59.6 mL/min/1.73      Comment: National Kidney Foundation and American Society of Nephrology (ASN) Task Force recommended calculation based on the Chronic Kidney Disease Epidemiology Collaboration (CKD-EPI) equation refit without adjustment for race.       Narrative:      GFR Normal >60  Chronic Kidney Disease <60  Kidney Failure <15      CBC & Differential [[id number]]  (Abnormal) Collected: 06/21/22 0544    Specimen: Blood Updated: 06/21/22 0609    Narrative:      The following orders were created for panel order CBC & Differential.  Procedure                               Abnormality         Status                     ---------                               -----------         ------                     CBC Auto Differential[[id number]]        Abnormal            Final result               Scan Slide[[id number]]                                                                     Please view results for these tests on the individual orders.    CBC Auto Differential [[id number]]  (Abnormal) Collected: 06/21/22 0544    Specimen: Blood Updated: 06/21/22 0609     WBC 5.35 10*3/mm3      RBC 3.17 10*6/mm3      Hemoglobin 10.0 g/dL      Hematocrit 31.0 %      MCV 97.8 fL      MCH 31.5 pg      MCHC 32.3 g/dL      RDW 15.2 %      RDW-SD 54.6 fl      MPV 10.0 fL      Platelets 129 10*3/mm3      Neutrophil % 75.1 %      Lymphocyte % 15.7 %      Monocyte % 8.4 %      Eosinophil % 0.2 %      Basophil % 0.2 %      Immature Grans % 0.4 %      Neutrophils, Absolute 4.02 10*3/mm3      Lymphocytes, Absolute 0.84 10*3/mm3      Monocytes, Absolute 0.45 10*3/mm3      Eosinophils, Absolute 0.01 10*3/mm3      Basophils, Absolute 0.01 10*3/mm3      Immature Grans, Absolute 0.02 10*3/mm3      nRBC 0.0 /100 WBC     Vitamin B12 [[id number]]  (Normal) Collected: 06/20/22 0533    Specimen: Blood Updated: 06/20/22 1326     Vitamin B-12 738 pg/mL     Narrative:      Results may be falsely increased if patient taking Biotin.      Basic Metabolic Panel [[id number]]  (Abnormal) Collected: 06/20/22 0533    Specimen: Blood Updated: 06/20/22 0730     Glucose 80 mg/dL      BUN 34 mg/dL      Creatinine 1.07 mg/dL      Sodium 154 mmol/L      Potassium 3.8 mmol/L      Comment: Slight hemolysis detected by analyzer. Results may be affected.        Chloride 103 mmol/L      CO2 34.0 mmol/L      Calcium 8.4 mg/dL      BUN/Creatinine Ratio 31.8     Anion Gap 17.0 mmol/L      eGFR 52.3 mL/min/1.73      Comment: National Kidney Foundation and American Society of Nephrology (ASN) Task Force recommended calculation based on the Chronic Kidney Disease Epidemiology Collaboration (CKD-EPI) equation refit without adjustment for race.       Narrative:      GFR Normal >60  Chronic Kidney Disease <60  Kidney Failure <15      CBC (No Diff) [[id number]]  (Abnormal) Collected: 06/20/22 0702     Specimen: Blood Updated: 06/20/22 0720     WBC 6.92 10*3/mm3      RBC 3.37 10*6/mm3      Hemoglobin 10.5 g/dL      Hematocrit 33.4 %      MCV 99.1 fL      MCH 31.2 pg      MCHC 31.4 g/dL      RDW 15.9 %      RDW-SD 57.1 fl      MPV 9.4 fL      Platelets 135 10*3/mm3     Blood Gas, Arterial - [[id number]]  (Abnormal) Collected: 06/19/22 1915    Specimen: Arterial Blood Updated: 06/19/22 1918     Site Right Radial     Ruiz's Test N/A     pH, Arterial 7.412 pH units      pCO2, Arterial 67.0 mm Hg      Comment: 86 Value above critical limit        pO2, Arterial 220.0 mm Hg      Comment: 83 Value above reference range        HCO3, Arterial 42.6 mmol/L      Comment: 83 Value above reference range        Base Excess, Arterial 15.5 mmol/L      Comment: 83 Value above reference range        O2 Saturation, Arterial >100.0 %      Comment: 93 Value above reportable range > 100.0        Barometric Pressure for Blood Gas 751 mmHg      Modality Room Air     FIO2 70 %      Ventilator Mode AVAP     Set Tidal Volume 450     Set Mech Resp Rate 18.0     EPAP 10     Comment: Meter: [id number]     :  [id number]        Collected by RRT    TSH+Free T4 [[id number]]  (Normal) Collected: 06/19/22 1531    Specimen: Blood Updated: 06/19/22 1903     TSH 1.470 uIU/mL      Free T4 0.95 ng/dL      Comment: T4 results may be falsely increased if patient taking Biotin.       Extra Tubes [[id number]] Collected: 06/19/22 1527    Specimen: Blood, Venous Line Updated: 06/19/22 1647    Narrative:      The following orders were created for panel order Extra Tubes.  Procedure                               Abnormality         Status                     ---------                               -----------         ------                     Lavender Top[[id number]]                                     Final result               Lavender Top[[id number]]                                     Final result               [place] Blood Culture Corey...[[id number]]                       Final result               Green Top (Gel)[[id number]]                                                               Please view results for these tests on the individual orders.    Lavender Top [[id number]] Collected: 06/19/22 1534    Specimen: Blood Updated: 06/19/22 1647     Extra Tube hold for add-on     Comment: Auto resulted       Lavender Top [[id number]] Collected: 06/19/22 1534    Specimen: Blood Updated: 06/19/22 1647     Extra Tube hold for add-on     Comment: Auto resulted       [place] Blood Culture Bottle Set [[id number]] Collected: 06/19/22 1527    Specimen: Blood from Arm, Right Updated: 06/19/22 1633     Extra Tube Hold for add-ons.     Comment: Auto resulted.       [place] Draw [[id number]] Collected: 06/19/22 1337    Specimen: Blood Updated: 06/19/22 1633    Narrative:      The following orders were created for panel order [place] Draw.  Procedure                               Abnormality         Status                     ---------                               -----------         ------                     Green Top (Gel)[[id number]]                                  Final result               Lavender Top[[id number]]                                                                Gold Top - SST[[id number]]                                   Final result               Light Blue Top[[id number]]                                   Final result                 Please view results for these tests on the individual orders.    Light Blue Top [[id number]] Collected: 06/19/22 1531    Specimen: Blood Updated: 06/19/22 1633     Extra Tube Hold for add-ons.     Comment: Auto resulted       Gold Top - SST [[id number]] Collected: 06/19/22 1531    Specimen: Blood Updated: 06/19/22 1633     Extra Tube Hold for add-ons.     Comment: Auto resulted.       Lactic Acid, Plasma [[id number]]  (Normal) Collected: 06/19/22 1531    Specimen: Blood from Arm, Left Updated: 06/19/22 1552     Lactate 1.5 mmol/L      COVID-19 and FLU A/B PCR - Swab, Nasopharynx [[id number]]  (Normal) Collected: 06/19/22 1451    Specimen: Swab from Nasopharynx Updated: 06/19/22 1538     COVID19 Not Detected     Influenza A PCR Not Detected     Influenza B PCR Not Detected    Narrative:      Fact sheet for providers: https://www.fda.gov/media/640901/download    Fact sheet for patients: https://www.fda.gov/media/366028/download    Test performed by PCR.    Blood Gas, Arterial - [[id number]]  (Abnormal) Collected: 06/19/22 1450    Specimen: Arterial Blood Updated: 06/19/22 1452     Site Right Brachial     Ruiz's Test N/A     pH, Arterial 7.318 pH units      Comment: 84 Value below reference range        pCO2, Arterial 73.7 mm Hg      Comment: 86 Value above critical limit        pO2, Arterial 98.9 mm Hg      HCO3, Arterial 37.8 mmol/L      Comment: 83 Value above reference range        Base Excess, Arterial 9.5 mmol/L      Comment: 83 Value above reference range        O2 Saturation, Arterial 98.2 %      Barometric Pressure for Blood Gas 752 mmHg      Modality Nasal Cannula     FIO2 <21 %      Flow Rate 3.0 lpm      Ventilator Mode NA     Comment: Meter: [id number]     :  [id number]       Green Top (Gel) [[id number]] Collected: 06/19/22 1337    Specimen: Blood Updated: 06/19/22 1447     Extra Tube Hold for add-ons.     Comment: Auto resulted.       Troponin [[id number]]  (Normal) Collected: 06/19/22 1337    Specimen: Blood Updated: 06/19/22 1402     Troponin T 0.010 ng/mL     Narrative:      Troponin T Reference Range:  <= 0.03 ng/mL-   Negative for AMI  >0.03 ng/mL-     Abnormal for myocardial necrosis.  Clinicians would have to utilize clinical acumen, EKG, Troponin and serial changes to determine if it is an Acute Myocardial Infarction or myocardial injury due to an underlying chronic condition.       Results may be falsely decreased if patient taking Biotin.      Comprehensive Metabolic Panel [[id number]]  (Abnormal) Collected: 06/19/22  1337    Specimen: Blood Updated: 06/19/22 1359     Glucose 126 mg/dL      BUN 31 mg/dL      Creatinine 0.93 mg/dL      Sodium 145 mmol/L      Potassium 4.2 mmol/L      Chloride 103 mmol/L      CO2 33.0 mmol/L      Calcium 8.7 mg/dL      Total Protein 6.0 g/dL      Albumin 3.40 g/dL      ALT (SGPT) 56 U/L      AST (SGOT) 44 U/L      Alkaline Phosphatase 149 U/L      Total Bilirubin 0.2 mg/dL      Globulin 2.6 gm/dL      A/G Ratio 1.3 g/dL      BUN/Creatinine Ratio 33.3     Anion Gap 9.0 mmol/L      eGFR 61.9 mL/min/1.73      Comment: National Kidney Foundation and American Society of Nephrology (ASN) Task Force recommended calculation based on the Chronic Kidney Disease Epidemiology Collaboration (CKD-EPI) equation refit without adjustment for race.       Narrative:      GFR Normal >60  Chronic Kidney Disease <60  Kidney Failure <15      BNP [[id number]]  (Abnormal) Collected: 06/19/22 1337    Specimen: Blood Updated: 06/19/22 1357     proBNP 14,033.0 pg/mL     Narrative:      Among patients with dyspnea, NT-proBNP is highly sensitive for the detection of acute congestive heart failure. In addition NT-proBNP of <300 pg/ml effectively rules out acute congestive heart failure with 99% negative predictive value.    Results may be falsely decreased if patient taking Biotin.      CBC & Differential [[id number]]  (Abnormal) Collected: 06/19/22 1337    Specimen: Blood Updated: 06/19/22 1356    Narrative:      The following orders were created for panel order CBC & Differential.  Procedure                               Abnormality         Status                     ---------                               -----------         ------                     CBC Auto Differential[[id number]]        Abnormal            Final result                 Please view results for these tests on the individual orders.    CBC Auto Differential [[id number]]  (Abnormal) Collected: 06/19/22 1337    Specimen: Blood Updated: 06/19/22 1356     WBC 6.63  10*3/mm3      RBC 3.53 10*6/mm3      Hemoglobin 10.7 g/dL      Hematocrit 36.5 %      .4 fL      MCH 30.3 pg      MCHC 29.3 g/dL      RDW 15.9 %      RDW-SD 61.0 fl      MPV 9.9 fL      Platelets 105 10*3/mm3      Neutrophil % 91.3 %      Lymphocyte % 5.3 %      Monocyte % 2.6 %      Eosinophil % 0.0 %      Basophil % 0.2 %      Immature Grans % 0.6 %      Neutrophils, Absolute 6.06 10*3/mm3      Lymphocytes, Absolute 0.35 10*3/mm3      Monocytes, Absolute 0.17 10*3/mm3      Eosinophils, Absolute 0.00 10*3/mm3      Basophils, Absolute 0.01 10*3/mm3      Immature Grans, Absolute 0.04 10*3/mm3      nRBC 0.0 /100 WBC         Imaging Results (Last 7 Days)     Procedure Component Value Units Date/Time    XR Chest PA & Lateral [[id number]] Collected: 06/22/22 0647     Updated: 06/22/22 1020    Narrative:      TWO VIEW CHEST    HISTORY: Follow-up congestive heart failure. Shortness of breath.  Acute and chronic respiratory failure with hypoxia and  hypercapnia.    Frontal and lateral films of the chest were obtained.    COMPARISON: June 13, 2022. Correlation CT May 30, 2022.    FINDINGS:    EKG leads.  Chronic obstructive pulmonary disease.  No acute infiltrate.  Apical pleural scarring, greater on the right.  Parenchymal scarring each upper lobe.  Old granulomatous disease.  Stable cardiomegaly.  The pulmonary vasculature is not increased.  Unchanged small left pleural effusion.  No pneumothorax.  No acute osseous abnormality.  Degenerative changes are present in the thoracic spine.      Impression:      CONCLUSION:  Chronic obstructive pulmonary disease.  No acute infiltrate.  Stable cardiomegaly.  The pulmonary vasculature is not increased.  Unchanged small left pleural effusion.    [id number]    Electronically signed by:  Albert Islas MD  6/22/2022 10:17 AM  CDT Workstation: [id number]    XR Chest 1 View [[id number]] Collected: 06/19/22 1338     Updated: 06/19/22 6688    Narrative:      EXAM: XR CHEST 1 VIEW    HISTORY:  [facility] Triage Protocol    COMPARISON: 6/13/2022    TECHNIQUE:   Portable view of the chest.    FINDINGS:   Stable cardiomegaly. Mild to moderate left and small right  pleural effusion, increased since prior, with coexisting  atelectasis versus infiltrate in the lung bases more on the left.  Bilateral apical pleural thickening with chronic changes.  The mediastinal contours are within normal limits. .  No evidence of mediastinal shift or pneumothorax.  Unremarkable visualized osseous structures.      Impression:      Mild to moderate left and small right pleural effusion, increased  since prior, with coexisting atelectasis versus infiltrate in the  lung bases more on the left.  Bilateral apical pleural thickening with chronic changes.            Electronically signed by:  Miah De La Rosa MD  6/19/2022 3:53 PM CDT  Workstation: [id number]    US Venous Doppler Lower Extremity Bilateral (duplex) [[id number]] Collected: 06/19/22 1345     Updated: 06/19/22 1516    Narrative:      EXAM: US LOWER EXTREMITY VEINS BILATERAL    HISTORY: leg swelling    COMPARISON STUDY:      TECHNIQUE:  Grayscale, duplex and color Doppler sonogram of the bilateral  lower extremities was obtained.     FINDINGS:    There is complete coaptation with graded compression at all  visualized levels from the common femoral vein to the calf veins.  There is augmentation of the venous waveform with calf  compression and respiratory variation seen at appropriate levels.   No filling defect is seen.   No Baker's cyst seen.     Unremarkable bilateral greater saphenous veins.      Impression:      IMPRESSION  1.  No evidence of deep venous thrombosis of the bilateral lower  extremities.    Electronically signed by:  Miah De La Rosa MD  6/19/2022 3:14 PM CDT  Workstation: [id number]            Chief Complaint on Day of Discharge:None     Hospital Course:  The patient was admitted and managed as follows:    Acute on chronic respiratory failure with hypoxia and hypercapnia  "secondary to acute on chronic systolic heart failure/COPD exacerbation: She was started on  noninvasive respiratory therapy, diuretics, bronchodilators, steroids with strict I's and O's, daily weights, salt and fluid restriction.  She did improve, became less symptomatic and was maintaining O2 saturation in the upper 90s on 2 L of supplemental oxygen (at baseline she uses 3 L of supplemental oxygen chronically).  Follow-up chest x-ray showed some improvement..   Echocardiogram done 6/2/2022 showed:  · Left ventricular ejection fraction appears to be 46 - 50%.  · Left ventricular diastolic function was normal.  · Moderate tricuspid valve regurgitation is present.  · Estimated right ventricular systolic pressure from tricuspid regurgitation is moderately elevated (45-55 mmHg).  · Moderate pulmonary hypertension is present.  · The following left ventricular wall segments are hypokinetic: mid inferoseptal     Hypernatremia (likely arising from overdiuresis): Resolved with D5W infusion. Mild diuretic induced azotemia has resolved.     Hyperkalemia: Patient was on potassium supplementation which was discontinued.  She did receive a dose of Lokelma and potassium was back to normal before discharge.      She benefited from PT and OT secondary to deconditioning and ambulatory home health was ordered on discharge.     Due to underlying history of chronic respiratory failure/O2 dependent COPD, chronic systolic heart failure and severe deconditioning patient requires HOB elevated greater than 30 degrees and requires frequent change in body position.  As a result, patient will benefit from a hospital which has been recommended and prescribed.       Condition on Discharge: Stable and improved.    Physical Exam on Discharge:  /88 (BP Location: Left arm, Patient Position: Lying)   Pulse 98   Temp 97 °F (36.1 °C) (Oral)   Resp 18   Ht 149.9 cm (59\")   Wt 44.6 kg (98 lb 4 oz)   SpO2 97%   BMI 19.84 kg/m²   Physical " Exam  Vitals and nursing note reviewed.   Constitutional:       General: She is not in acute distress.     Appearance: She is well-developed. She is cachectic. She is ill-appearing. She is not diaphoretic.      Comments: Patient is cachectic and severely malnourished.   HENT:      Head: Normocephalic and atraumatic.      Right Ear: External ear normal.      Left Ear: External ear normal.      Nose: Nose normal.   Eyes:      Extraocular Movements: Extraocular movements intact.      Conjunctiva/sclera: Conjunctivae normal.      Pupils: Pupils are equal, round, and reactive to light.   Neck:      Thyroid: No thyromegaly.      Vascular: No JVD.   Cardiovascular:      Rate and Rhythm: Normal rate and regular rhythm.      Heart sounds: Normal heart sounds. No murmur heard.    No friction rub. No gallop.   Pulmonary:      Effort: Pulmonary effort is normal. No respiratory distress.      Breath sounds: No stridor.  No wheezing or rales.  She has distant breath sounds bilaterally.    Chest:      Chest wall: No tenderness.   Abdominal:      General: Bowel sounds are normal. There is no distension.      Palpations: Abdomen is soft. There is no mass.      Tenderness: There is no abdominal tenderness. There is no right CVA tenderness, left CVA tenderness, guarding or rebound.      Hernia: No hernia is present.   Musculoskeletal:         General: No swelling, tenderness or deformity. Normal range of motion.      Cervical back: Normal range of motion and neck supple.      Right lower leg: No edema.      Left lower leg: No edema  Skin:     General: Skin is warm and dry.      Coloration: Skin is not jaundiced or pale.      Findings: No bruising, erythema, lesion or rash.   Neurological:      General: No focal deficit present.      Mental Status: She is alert and oriented to person, place, and time.      Cranial Nerves: No cranial nerve deficit.      Sensory: No sensory deficit.      Motor: No weakness.      Coordination:  Coordination normal.      Gait: Gait normal.      Deep Tendon Reflexes: Reflexes are normal and symmetric. Reflexes normal.   Psychiatric:         Mood and Affect: Mood is anxious.         Behavior: Behavior normal. Behavior is cooperative.         Thought Content: Thought content normal.         Judgment: Judgment normal  [place] [facility] [place] Medications:     Discharge Medications      Changes to Medications      Instructions Start Date   furosemide 20 MG tablet  Commonly known as: Lasix  What changed:   · medication strength  · how much to take  · when to take this  · reasons to take this   20 mg, Oral, Daily      predniSONE 20 MG tablet  Commonly known as: DELTASONE  What changed:   · how much to take  · how to take this  · when to take this  · additional instructions   20 mg, Oral, Daily         Continue These Medications      Instructions Start Date   albuterol sulfate  (90 Base) MCG/ACT inhaler  Commonly known as: PROVENTIL HFA;VENTOLIN HFA;PROAIR HFA   INHALE 2 PUFFS BY MOUTH EVERY 6 HOURS AS NEEDED FOR WHEEZING      clotrimazole 1 % cream  Commonly known as: LOTRIMIN   Topical, 2 Times Daily      Combivent Respimat  MCG/ACT inhaler  Generic drug: ipratropium-albuterol   INHALE 1 PUFF BY MOUTH 4 TIMES DAILY      ipratropium-albuterol 0.5-2.5 mg/3 ml nebulizer  Commonly known as: DUO-NEB   Inhale the contents of 1 vial by nebulization 4 (Four) Times a Day for 30 days.      docusate sodium 100 MG capsule  Commonly known as: Colace   100 mg, Oral, 2 Times Daily      guaiFENesin-dextromethorphan 100-10 MG/5ML syrup  Commonly known as: ROBITUSSIN DM   10 mL, Oral, 4 Times Daily PRN      latanoprost 0.005 % ophthalmic solution  Commonly known as: XALATAN   INSTILL 1 DROP INTO EACH EYE AT BEDTIME      lisinopril 20 MG tablet  Commonly known as: PRINIVIL,ZESTRIL   Take 1 tablet by mouth once daily      metoprolol tartrate 25 MG tablet  Commonly known as: LOPRESSOR    Take one-half tablet (12.5 mg) by mouth Every 12 (Twelve) Hours.      sertraline 25 MG tablet  Commonly known as: ZOLOFT   25 mg, Oral, Daily      vitamin D3 125 MCG (5000 UT) capsule capsule   1 capsule, Oral, Daily         Stop These Medications    benzonatate 100 MG capsule  Commonly known as: Tessalon Perles     cetirizine 10 MG tablet  Commonly known as: zyrTEC     loratadine 10 MG tablet  Commonly known as: Claritin            Discharge Diet: Heart healthy.    Activity at Discharge: As tolerated.    Discharge Care Plan/Instructions: Patient has been advised to take her medications as prescribed and to return to emergency room in the event of any worsening symptoms.    Follow-up Appointments:   Future Appointments   Date Time Provider Department Center   7/6/2022  9:45 AM Jan Salgado MD [facility] None   Follow-up primary care provider as outpatient.    Test Results Pending at Discharge:       Lino Cartwright MD  06/23/22  08:46 CDT    Time: 35 minutes.                Electronically signed by Lino Cartwright MD at 06/23/22 [id number]

## 2022-06-23 NOTE — OUTREACH NOTE
Prep Survey    Flowsheet Row Responses   [facility] patient discharged from? [place]   Is LACE score < 7 ? No   Emergency Room discharge w/ pulse ox? No   Eligibility [facility]   Date of Admission 06/19/22   Date of Discharge 06/23/22   Discharge Disposition Home or Self Care   Discharge diagnosis Shortness of breath   Does the patient have one of the following disease processes/diagnoses(primary or secondary)? Other   Does the patient have Home health ordered? No   Is there a DME ordered? No   Prep survey completed? Yes          IAN SANDERS - Registered Nurse

## 2022-06-24 NOTE — OUTREACH NOTE
Call Center TCM Note    Flowsheet Row Responses   [facility] patient discharged from? [place]   Does the patient have one of the following disease processes/diagnoses(primary or secondary)? Other   TCM attempt successful? Yes  [no verbal release]   Call Status Comments Patient EMELYN on the phone   Call end time 1335   Discharge diagnosis Shortness of breath   Meds reviewed with patient/caregiver? Yes   Is the patient having any side effects they believe may be caused by any medication additions or changes? No   Does the patient have all medications ordered at discharge? Yes   Is the patient taking all medications as directed (includes completed medication regime)? Yes   Does the patient have a primary care provider?  Yes   Does the patient have an appointment with their PCP within 7 days of discharge? Yes   Comments regarding PCP HOSP DC FU appt 6/30/22 @ 10am.    Has the patient kept scheduled appointments due by today? N/A   Has home health visited the patient within 72 hours of discharge? Yes   What DME was ordered? HOSP BED   Has all DME been delivered? Yes   Psychosocial issues? No   Did the patient receive a copy of their discharge instructions? Yes   Nursing interventions Reviewed instructions with patient   What is the patient's perception of their health status since discharge? Improving   Is the patient/caregiver able to teach back signs and symptoms related to disease process for when to call PCP? Yes   Is the patient/caregiver able to teach back signs and symptoms related to disease process for when to call 911? Yes   Is the patient/caregiver able to teach back the hierarchy of who to call/visit for symptoms/problems? PCP, Specialist, Home health nurse, Urgent Care, ED, 911 Yes   TCM call completed? Yes   Wrap up additional comments Pt reports she is doing well. No needs.           Malorie Keller RN    6/24/2022, 13:36 CDT

## 2022-06-24 NOTE — HOME HEALTH
REASON FOR REFERRAL:  81 Y/O FEMALE HOSPITALIZED AT [facility] 6/19/22-6/23/22 WITH COMPLAINTS OF SHORTNESS OF BREATH AND LE EDEMA.  SHE WAS TREATED FOR ACUTE ON CHRONIC RESPIRATORY FAILURE WITH HYPOXIA/HYPERCAPNEIA, CHF EXACERBATION, COPD EXACERTAION, HYPERNATREMIA, AND HYPERKALEMIA.  SHE HAD BEEN PREVIOUSLY HOSPITALIZED AT [facility] 5/30/22-6/6/22 FOR RESPIRATORY FAILURE AND PNEUMONIA.  PATIENT REPORTED THAT SHE IS WEAK AFTER BEING HOSPITALIZED TWICE RECENTLY.  SHE REPORTED SHE WANTS TO WORK WITH OT ON REGAINING STRENGTH AND BEING ABLE TO USE TOILET IN THE BATHROOM INSTEAD OF THE BSC IN THE LIVING ROOM.     PMH:  COPD-O2 DEPENDENT, CHF, MALIGNANT TUMOR ESOPHAGUS, CATARACT SURGERY, GLAUCOMA.     PLOF:  INDEPENDENT WITH ADLS/SIMPLE IADLS, FUNCTIONAL TRANSFERS/FUNCTIONAL MOBILITY USING RW.  OUT IN THE COMMUNITY WITH ASSISTANCE.  DOES NOT DRIVE.     HOME ENVIRONMENT:  PATIENT LIVES ALONE IN ONE LEVEL HOME WITH RAMP TO ENTER WITH B HANDRAILS.  HER NEPHEW LIVES NEXT DOOR AND BRINGS HER MUCH OF HER MEALS.     PRECAUTIONS:  HIGH FALL RISK, MONITOR O2 WITH ACTIVITY     MD APPTS:  6/30/22 DR BARRETT, 7/16/22 DR VALENZUELA,      DME:  HOSPITAL BED, RW, BSC, OXYGEN EQUIPMENT.     AROM B UES: WFL     STRENGTH B UES: 4+/5 GROSSLY THROUGHOUT.     HAND DOMINANCE: RIGHT HANDED, B  STRENGTHS     REHAB POTENTIAL:  GOOD FOR GOALS     WISH TO ADDRESS BATH/DRESSING WITH OT: NO     PATIENT'S GOAL: REGAIN STRENGTH AND RETURN TO PLOF    MEDICAL NECISSITY:  PATIENT PRESENTS WITH FALL RISK, LIMITED ACTIVITY TOLERANCE, WEAKNESS, AND DECREASED FUNCTIONAL TRANSFER INDEPENDENCE AFTER RECENT HOSPITALIZATIONS X2 FOR PNEUMONIA, COPD EXACERBATION, CHF EXACERBATION, AND HYPERKALEMIA.  SHE REQUIRES SKILLED HOME BASED OT SERVICES FOR FUNCTIONAL TRANSFER TRAINING FOR ADLS, PATIENT EDUCATION FOR SAFETY/FALL PREVENTION, B UE THER. EX. FOR STRENGTHENING, STANDING TOLERANCE TRAINING FOR ADLS/IADLS, AND IADL RETRAINING.

## 2022-06-27 NOTE — HOME HEALTH
"REASON FOR REFERRAL:  Patient admitted to  6/19/2022-6/23/2022 with acute on chronic respiratory failure with hypoxia and hypercapnia due to CHF exacernation, COPD exacerbation.  She was previously admitted to  5/30/2022-6/6/2022 with pneumonia.  Patient reports she is feeling better and the hospital bed helps a lot.  She is currently using the BSC because she can't walk all the way to the bathroom yet.  DIAGNOSIS: Weakness, abnormal gait  SURGICAL PROCEDURE: N/A  PERTINENT MEDICAL HISTORY: CHF, COPD, HTN, hx esophageal CA, vitamin D deficiency, hx cataracts, glaucoma, malnutrition  PRIOR LEVEL OF FUNCTION: Patient modif I in home with rolling walker and O2  PATIENT GOAL FOR THIS EPISODE OF CARE: \"Be able to get around\"  HOME SOCIAL ENVIRONMENT: Lives in single story home with ramp to enter.  Nephew lives next door and is available to assist as needed"

## 2022-06-30 NOTE — PROGRESS NOTES
Injection  Injection performed in    by Raquel Luna MA. Patient tolerated the procedure well without complications.  06/30/22   Raquel Luna MA

## 2022-06-30 NOTE — PROGRESS NOTES
Transitional Care Follow Up Visit  Subjective     Tamara Singh is a 82 y.o. female who presents for a transitional care management visit.    Within 48 business hours after discharge our office contacted her via telephone to coordinate her care and needs.      I reviewed and discussed the details of that call along with the discharge summary, hospital problems, inpatient lab results, inpatient diagnostic studies, and consultation reports with Tamara.     Current outpatient and discharge medications have been reconciled for the patient.  Reviewed by: Jean Pierre Scanlon MD      Date of TCM Phone Call 6/23/2022   [facility]   Date of Admission 6/19/2022   Date of Discharge 6/23/2022   Discharge Disposition Home or Self Care     Risk for Readmission (LACE) Score: 9 (6/23/2022  5:01 AM)      History of Present Illness   Course During Hospital Stay:  Adm 6/19/22 and d/c 6/23/22.  Sob, acute on chronic respiratory failure with hypoxia and hypercapnea.   She continues to be very weak.  She reports to me she feels like she is suffocating all of the time.  She has a relative with her who use to be a nurse that is helping her.      The following portions of the patient's history were reviewed and updated as appropriate: allergies, current medications, past family history, past medical history, past social history, past surgical history and problem list.    Review of Systems   Constitutional: Negative for chills, fatigue and fever.   HENT: Negative for congestion, ear discharge, ear pain, facial swelling, hearing loss, postnasal drip, rhinorrhea, sinus pressure, sore throat, trouble swallowing and voice change.    Eyes: Negative for discharge, redness and visual disturbance.   Respiratory: Positive for shortness of breath. Negative for cough, chest tightness and wheezing.    Cardiovascular: Negative for chest pain and palpitations.   Gastrointestinal: Negative for abdominal pain, blood in  stool, constipation, diarrhea, nausea and vomiting.   Endocrine: Negative for polydipsia and polyuria.   Genitourinary: Negative for dysuria, flank pain, hematuria and urgency.   Musculoskeletal: Negative for arthralgias, back pain, joint swelling and myalgias.   Skin: Negative for rash.   Neurological: Negative for dizziness, weakness, numbness and headaches.   Hematological: Negative for adenopathy.   Psychiatric/Behavioral: Negative for confusion and sleep disturbance. The patient is not nervous/anxious.        Objective   Physical Exam  Vitals and nursing note reviewed.   Constitutional:       Appearance: She is well-developed. She is ill-appearing.      Comments: frail   HENT:      Head: Normocephalic and atraumatic.      Right Ear: External ear normal.      Left Ear: External ear normal.      Nose: Nose normal.   Eyes:      Extraocular Movements: Extraocular movements intact.      Conjunctiva/sclera: Conjunctivae normal.      Pupils: Pupils are equal, round, and reactive to light.   Pulmonary:      Breath sounds: Wheezing present.      Comments: Diminished with supraclavicular retractions.   Musculoskeletal:         General: Normal range of motion.      Cervical back: Normal range of motion.   Neurological:      General: No focal deficit present.      Mental Status: She is alert and oriented to person, place, and time.   Psychiatric:         Behavior: Behavior normal.         Thought Content: Thought content normal.         Judgment: Judgment normal.         Assessment & Plan   Diagnoses and all orders for this visit:    1. Hospital discharge follow-up (Primary)    2. SOB (shortness of breath)  -     morphine 20 MG/ML concentrated solution; Take 0.25 mL by mouth Every 2 (Two) Hours As Needed (air hunger, feeling like suffocating.).  Dispense: 30 mL; Refill: 0  -     triamcinolone acetonide (KENALOG-40) injection 80 mg    3. Chronic systolic CHF (congestive heart failure) (HCC)    4. Chronic obstructive  pulmonary disease, unspecified COPD type (HCC)      I suggest hospice.  Family to discuss.  She can not have ativan.

## 2022-07-01 NOTE — TELEPHONE ENCOUNTER
Ms. Stanley came by to see if you would send in Ms. Singh's Metoprolol Tartrate (Lopressor) 25 MG.  She said the cardiologist gave it to her but wanted to know if you would refill it?     Incoming Refill Request      Medication requested (name and dose): Metoprolol (Lopressor) 25 MG    Pharmacy where request should be sent: [facility]    Additional details provided by patient:     Best call back number: [phone]    Does the patient have less than a 3 day supply:  [] Yes  [x] No    Charlee Bhakta Rep  07/01/22, 09:10 CDT

## 2022-07-01 NOTE — CASE COMMUNICATION
Patient missed a Physical Therapy visit from [facility] on 7/1/22    Reason: pts family reports pt had appointment w/ Dr. Scanlon on 6/30/22, and had mod difficulty/SOA w/ activities; pts family request no HH PT Rx and reports going to contact Dr. Nguyen office this date to order Hospice consult; HH supervisor Belkys Guadarrama notified of pts family report      For your records only.   As per home health protocol, MD must be notified of mis sed/cancelled visits; therefore the prescribed frequency was not met.     Triston Yuong PTA

## 2022-07-11 ENCOUNTER — HOME CARE VISIT (OUTPATIENT)
Dept: HOME HEALTH SERVICES | Facility: HOME HEALTHCARE | Age: 82
End: 2022-07-11